# Patient Record
Sex: MALE | Race: WHITE | Employment: FULL TIME | URBAN - METROPOLITAN AREA
[De-identification: names, ages, dates, MRNs, and addresses within clinical notes are randomized per-mention and may not be internally consistent; named-entity substitution may affect disease eponyms.]

---

## 2017-05-22 ENCOUNTER — OFFICE VISIT (OUTPATIENT)
Dept: DERMATOLOGY | Age: 52
End: 2017-05-22

## 2017-05-22 DIAGNOSIS — Z12.83 SCREENING EXAM FOR SKIN CANCER: ICD-10-CM

## 2017-05-22 DIAGNOSIS — L57.0 ACTINIC KERATOSES: ICD-10-CM

## 2017-05-22 DIAGNOSIS — Z80.8 FAMILY HISTORY OF MELANOMA: ICD-10-CM

## 2017-05-22 DIAGNOSIS — D22.9 MULTIPLE BENIGN NEVI: Primary | ICD-10-CM

## 2017-05-22 DIAGNOSIS — Z85.828 HISTORY OF NONMELANOMA SKIN CANCER: ICD-10-CM

## 2017-05-22 PROCEDURE — 99214 OFFICE O/P EST MOD 30 MIN: CPT | Performed by: DERMATOLOGY

## 2017-05-22 PROCEDURE — 17003 DESTRUCT PREMALG LES 2-14: CPT | Performed by: DERMATOLOGY

## 2017-05-22 PROCEDURE — 17000 DESTRUCT PREMALG LESION: CPT | Performed by: DERMATOLOGY

## 2018-06-04 ENCOUNTER — OFFICE VISIT (OUTPATIENT)
Dept: DERMATOLOGY | Age: 53
End: 2018-06-04

## 2018-06-04 DIAGNOSIS — Z85.828 HISTORY OF NONMELANOMA SKIN CANCER: ICD-10-CM

## 2018-06-04 DIAGNOSIS — Z80.8 FAMILY HISTORY OF MELANOMA: ICD-10-CM

## 2018-06-04 DIAGNOSIS — D22.9 MULTIPLE BENIGN NEVI: Primary | ICD-10-CM

## 2018-06-04 DIAGNOSIS — L57.0 ACTINIC KERATOSES: ICD-10-CM

## 2018-06-04 DIAGNOSIS — Z12.83 SCREENING EXAM FOR SKIN CANCER: ICD-10-CM

## 2018-06-04 PROCEDURE — 99213 OFFICE O/P EST LOW 20 MIN: CPT | Performed by: DERMATOLOGY

## 2018-06-04 PROCEDURE — 17000 DESTRUCT PREMALG LESION: CPT | Performed by: DERMATOLOGY

## 2018-06-04 PROCEDURE — 17003 DESTRUCT PREMALG LES 2-14: CPT | Performed by: DERMATOLOGY

## 2019-07-25 ENCOUNTER — OFFICE VISIT (OUTPATIENT)
Dept: INTERNAL MEDICINE CLINIC | Age: 54
End: 2019-07-25
Payer: COMMERCIAL

## 2019-07-25 ENCOUNTER — HOSPITAL ENCOUNTER (OUTPATIENT)
Age: 54
Discharge: HOME | End: 2019-07-25
Payer: COMMERCIAL

## 2019-07-25 VITALS
OXYGEN SATURATION: 98 % | DIASTOLIC BLOOD PRESSURE: 80 MMHG | HEART RATE: 88 BPM | SYSTOLIC BLOOD PRESSURE: 130 MMHG | RESPIRATION RATE: 16 BRPM | BODY MASS INDEX: 26.77 KG/M2 | HEIGHT: 70 IN | WEIGHT: 187 LBS

## 2019-07-25 DIAGNOSIS — Z11.59 SCREENING FOR VIRAL DISEASE: ICD-10-CM

## 2019-07-25 DIAGNOSIS — Z00.00 ROUTINE GENERAL MEDICAL EXAMINATION AT A HEALTH CARE FACILITY: ICD-10-CM

## 2019-07-25 DIAGNOSIS — Z12.11 SCREEN FOR COLON CANCER: ICD-10-CM

## 2019-07-25 DIAGNOSIS — Z12.5 SPECIAL SCREENING FOR MALIGNANT NEOPLASM OF PROSTATE: ICD-10-CM

## 2019-07-25 DIAGNOSIS — Z00.00 WELL ADULT EXAM: Primary | ICD-10-CM

## 2019-07-25 DIAGNOSIS — Z13.1 SCREENING FOR DIABETES MELLITUS: ICD-10-CM

## 2019-07-25 LAB
A/G RATIO: 1.7 (ref 1.1–2.2)
ALBUMIN SERPL-MCNC: 4.7 G/DL (ref 3.4–5)
ALP BLD-CCNC: 68 U/L (ref 40–129)
ALT SERPL-CCNC: 20 U/L (ref 10–40)
ANION GAP SERPL CALCULATED.3IONS-SCNC: 13 MMOL/L (ref 3–16)
AST SERPL-CCNC: 22 U/L (ref 15–37)
BILIRUB SERPL-MCNC: 0.4 MG/DL (ref 0–1)
BUN BLDV-MCNC: 13 MG/DL (ref 7–20)
CALCIUM SERPL-MCNC: 9.5 MG/DL (ref 8.3–10.6)
CHLORIDE BLD-SCNC: 102 MMOL/L (ref 99–110)
CHOLESTEROL, TOTAL: 197 MG/DL (ref 0–199)
CO2: 26 MMOL/L (ref 21–32)
CREAT SERPL-MCNC: 0.9 MG/DL (ref 0.9–1.3)
ESTIMATED AVERAGE GLUCOSE: 119.8 MG/DL
GFR AFRICAN AMERICAN: >60
GFR NON-AFRICAN AMERICAN: >60
GLOBULIN: 2.7 G/DL
GLUCOSE BLD-MCNC: 112 MG/DL (ref 70–99)
HBA1C MFR BLD: 5.8 %
HDLC SERPL-MCNC: 40 MG/DL (ref 40–60)
HEPATITIS C ANTIBODY INTERPRETATION: NORMAL
LDL CHOLESTEROL CALCULATED: 106 MG/DL
POTASSIUM SERPL-SCNC: 4.2 MMOL/L (ref 3.5–5.1)
PROSTATE SPECIFIC ANTIGEN: 1.32 NG/ML (ref 0–4)
SODIUM BLD-SCNC: 141 MMOL/L (ref 136–145)
TOTAL PROTEIN: 7.4 G/DL (ref 6.4–8.2)
TRIGL SERPL-MCNC: 255 MG/DL (ref 0–150)
VLDLC SERPL CALC-MCNC: 51 MG/DL

## 2019-07-25 PROCEDURE — 83036 HEMOGLOBIN GLYCOSYLATED A1C: CPT

## 2019-07-25 PROCEDURE — 84153 ASSAY OF PSA TOTAL: CPT

## 2019-07-25 PROCEDURE — 99396 PREV VISIT EST AGE 40-64: CPT | Performed by: INTERNAL MEDICINE

## 2019-07-25 PROCEDURE — 36415 COLL VENOUS BLD VENIPUNCTURE: CPT

## 2019-07-25 PROCEDURE — 86803 HEPATITIS C AB TEST: CPT

## 2019-07-25 PROCEDURE — 80061 LIPID PANEL: CPT

## 2019-07-25 PROCEDURE — 80053 COMPREHEN METABOLIC PANEL: CPT

## 2019-07-25 ASSESSMENT — PATIENT HEALTH QUESTIONNAIRE - PHQ9
SUM OF ALL RESPONSES TO PHQ QUESTIONS 1-9: 0
SUM OF ALL RESPONSES TO PHQ QUESTIONS 1-9: 0
1. LITTLE INTEREST OR PLEASURE IN DOING THINGS: 0
2. FEELING DOWN, DEPRESSED OR HOPELESS: 0
SUM OF ALL RESPONSES TO PHQ9 QUESTIONS 1 & 2: 0

## 2019-07-25 NOTE — PROGRESS NOTES
Subjective:      Patient ID: Wilma Moncada is a 62 y.o. male. HPI  Here for a well exam. No health concerns  Past Medical History, Medications, Social History, Family History, Health Maintenance have been reviewed with Juan Diego Sandra. Review of Systems   Constitutional: Negative for unexpected weight change. Respiratory: Negative for cough, chest tightness, shortness of breath and wheezing. Cardiovascular: Negative for chest pain, palpitations and leg swelling. Gastrointestinal: Negative for nausea, vomiting, diarrhea, constipation and abdominal distention. Musculoskeletal: Negative for myalgias, back pain and arthralgias. Neurological: Negative for tremors and numbness. All other systems reviewed and are negative. Objective:   Physical Exam   Constitutional: He is oriented to person, place, and time. He appears well-developed and well-nourished. No distress. HENT:   Right Ear: External ear normal.   Left Ear: External ear normal.   Mouth/Throat: Oropharynx is clear and moist. No oropharyngeal exudate. Neck: Neck supple. No thyromegaly present. Cardiovascular: Normal rate, regular rhythm and normal heart sounds. Pulmonary/Chest: Effort normal and breath sounds normal. No respiratory distress. He has no wheezes. He has no rales. Abdominal: Soft. He exhibits no distension. There is no tenderness. There is no rebound and no guarding. Genitourinary:   declines   Musculoskeletal: He exhibits no edema. Neurological: He is alert and oriented to person, place, and time. Skin: He is not diaphoretic. Vitals:    07/25/19 0710   BP: (!) 150/102 130/80   Pulse: 88   Resp: 16   SpO2: 98%   Waist     36\"      Assessment:     Encouraged exercise and healthy diet. F/u with ob/gyn and dentist regularly. Recommend eye exam.  Wears seat belt. Provided rx for fasting labs. Continue medications.      FIT test ordered    Plan:      See above plan

## 2019-08-22 ENCOUNTER — TELEPHONE (OUTPATIENT)
Dept: INTERNAL MEDICINE CLINIC | Age: 54
End: 2019-08-22

## 2019-08-26 ENCOUNTER — TELEPHONE (OUTPATIENT)
Dept: INTERNAL MEDICINE CLINIC | Age: 54
End: 2019-08-26

## 2020-07-27 ENCOUNTER — HOSPITAL ENCOUNTER (EMERGENCY)
Age: 55
Discharge: HOME | End: 2020-07-27
Payer: COMMERCIAL

## 2020-07-27 VITALS
SYSTOLIC BLOOD PRESSURE: 151 MMHG | DIASTOLIC BLOOD PRESSURE: 99 MMHG | BODY MASS INDEX: 27.2 KG/M2 | OXYGEN SATURATION: 98 % | HEART RATE: 89 BPM | RESPIRATION RATE: 18 BRPM | HEIGHT: 70 IN | TEMPERATURE: 98.3 F | WEIGHT: 190 LBS

## 2020-07-27 PROCEDURE — 12001 RPR S/N/AX/GEN/TRNK 2.5CM/<: CPT

## 2020-07-27 PROCEDURE — 99283 EMERGENCY DEPT VISIT LOW MDM: CPT

## 2020-07-27 ASSESSMENT — PAIN SCALES - GENERAL: PAINLEVEL_OUTOF10: 1

## 2020-07-27 NOTE — ED PROVIDER NOTES
This patient was not seen and evaluated by the attending physician. CHIEF COMPLAINT  Laceration (pt has left 4th finger laceration from hedging at work today. Bleeding controlled at this time. )      HISTORY OF PRESENT ILLNESS  Norman Cogan is a 60 y.o. male who presents to the ED for evaluation of laceration to the tip of his left fourth finger. Patient was using a hedge or at work today when he clipped it. Small amount of bleeding. Is not anticoagulated. He has no other injuries or complaints. Patient is up-to-date on tetanus    LOCATION:left ring finger  QUALITY:ache  SEVERITY:1  DURATION:happened just pta  MODIFYING FACTORS:none noted    No other complaints, modifying factors or associated symptoms. Nursing notes reviewed. History reviewed. No pertinent past medical history. History reviewed. No pertinent surgical history. Family History   Problem Relation Age of Onset    Cancer Mother         skin     Cancer Father         skin, prostate     Cancer Sister         skin, Melanoma     Social History     Socioeconomic History    Marital status:      Spouse name: Not on file    Number of children: Not on file    Years of education: Not on file    Highest education level: Not on file   Occupational History    Not on file   Social Needs    Financial resource strain: Not on file    Food insecurity     Worry: Not on file     Inability: Not on file    Transportation needs     Medical: Not on file     Non-medical: Not on file   Tobacco Use    Smoking status: Never Smoker    Smokeless tobacco: Never Used   Substance and Sexual Activity    Alcohol use:  Yes     Alcohol/week: 7.0 standard drinks     Types: 7 Standard drinks or equivalent per week    Drug use: No    Sexual activity: Not on file   Lifestyle    Physical activity     Days per week: Not on file     Minutes per session: Not on file    Stress: Not on file   Relationships    Social connections     Talks on phone: Not on file     Gets together: Not on file     Attends Hoahaoism service: Not on file     Active member of club or organization: Not on file     Attends meetings of clubs or organizations: Not on file     Relationship status: Not on file    Intimate partner violence     Fear of current or ex partner: Not on file     Emotionally abused: Not on file     Physically abused: Not on file     Forced sexual activity: Not on file   Other Topics Concern    Not on file   Social History Narrative    Not on file     No current facility-administered medications for this encounter. No current outpatient medications on file. No Known Allergies    REVIEW OF SYSTEMS  6 systems reviewed, pertinent positives per HPI otherwise noted to be negative    PHYSICAL EXAM  BP (!) 151/99   Pulse 89   Temp 98.3 °F (36.8 °C) (Oral)   Resp 18   Ht 5' 10\" (1.778 m)   Wt 190 lb (86.2 kg)   SpO2 98%   BMI 27.26 kg/m²   GENERAL APPEARANCE: Awake and alert. Cooperative. No acute distress. HEAD: Normocephalic. Atraumatic. EYES: No outward signs of trauma. No obvious abnormality. EOM's grossly intact. ENT: Mucous membranes are moist.   NECK: Supple. Normal ROM. CHEST: Equal symmetric chest rise. LUNGS: Breathing is unlabored. No obvious respiratory distress. Abdomen: Nondistended  EXTREMITIES: MAEE. No acute deformities. SKIN: Warm and dry. 2 cm laceration to the distal tip of the left fourth digit. Bleeding is controlled, no evidence of tendon involvement. No neurologic deficits. No foreign body. NEUROLOGICAL: Alert and oriented. Strength is 5/5 in all extremities and sensation is intact. PSYCH: Normal Affect  Labs:    No results found for this visit on 07/27/20. RADIOLOGY  No results found. PROCEDURE:  LACERATION REPAIR  Tokandi Sandra or their surrogate had an opportunity to ask questions, and the risks, benefits, and alternatives were discussed. The laceration is 2cm in length. The wound was prepped and draped to maintain a sterile field. A local anesthetic was used to completely anesthetize the wound. It was copiously irrigated. It was explored to its depth in a bloodless field with no sign of tendon, nerve, or vascular injury. No foreign bodies were identified. It was closed with 5-0 ethilon sutures. There were no complications during the procedure. ED COURSE/MDM  Patient seen and evaluated here in the ER with the supervising physician available for consultation. Patient presented to the emergency room today with complaints of a laceration. Patient had a 2 cm laceration to the distal tip of the left fourth digit. Patient laceration was cleansed and closed, see procedure note. Patient was instructed to monitor for signs of infection, sutures to be removed in 10 to 14 days. Patient verbalized understanding of the plan to keep the area clean and dry and return to the ED for any signs of infection. Patient was given scripts for the following medications. I counseled patient how to take these medications. New Prescriptions    No medications on file         MDM  Wound repaired without difficulty. Td updated or currently up to date. Provided with detailed laceration care instructions and explained signs and symptoms of infection. Will DC with follow up at appropriate time for suture removal. Return for any concerns. CLINICAL IMPRESSION  1. Laceration of left ring finger without foreign body without damage to nail, initial encounter        Blood pressure (!) 151/99, pulse 89, temperature 98.3 °F (36.8 °C), temperature source Oral, resp. rate 18, height 5' 10\" (1.778 m), weight 190 lb (86.2 kg), SpO2 98 %. DISPOSITION  Patient was discharged to home in good condition.         LELAND Duran - CNP  07/27/20 9770

## 2020-07-27 NOTE — ED NOTES
Wound care completed on patient. Tourniquet placed on left 4th finger. Cleansed with hibs cleanse and saline. Irrigated with >30mls of sterile saline.         Nguyen Davis RN  07/27/20 1152

## 2020-07-27 NOTE — ED NOTES
Pt's lac covered with sterile Gauze on arrival. Bleeding controlled.       Laurel Pereira RN  07/27/20 7675

## 2020-10-12 ENCOUNTER — HOSPITAL ENCOUNTER (INPATIENT)
Age: 55
LOS: 4 days | Discharge: HOME | DRG: 286 | End: 2020-10-16
Attending: EMERGENCY MEDICINE | Admitting: INTERNAL MEDICINE
Payer: COMMERCIAL

## 2020-10-12 ENCOUNTER — APPOINTMENT (OUTPATIENT)
Dept: GENERAL RADIOLOGY | Age: 55
DRG: 286 | End: 2020-10-12
Payer: COMMERCIAL

## 2020-10-12 PROBLEM — I50.9 ACUTE HEART FAILURE (HCC): Status: ACTIVE | Noted: 2020-10-12

## 2020-10-12 LAB
A/G RATIO: 1.6 (ref 1.1–2.2)
ALBUMIN SERPL-MCNC: 4 G/DL (ref 3.4–5)
ALP BLD-CCNC: 63 U/L (ref 40–129)
ALT SERPL-CCNC: 213 U/L (ref 10–40)
ANION GAP SERPL CALCULATED.3IONS-SCNC: 12 MMOL/L (ref 3–16)
AST SERPL-CCNC: 89 U/L (ref 15–37)
BASOPHILS ABSOLUTE: 0 K/UL (ref 0–0.2)
BASOPHILS RELATIVE PERCENT: 0.3 %
BILIRUB SERPL-MCNC: 1.1 MG/DL (ref 0–1)
BILIRUBIN URINE: NEGATIVE
BLOOD, URINE: NEGATIVE
BUN BLDV-MCNC: 28 MG/DL (ref 7–20)
CALCIUM SERPL-MCNC: 9.6 MG/DL (ref 8.3–10.6)
CHLORIDE BLD-SCNC: 106 MMOL/L (ref 99–110)
CLARITY: CLEAR
CO2: 24 MMOL/L (ref 21–32)
COLOR: YELLOW
CREAT SERPL-MCNC: 1 MG/DL (ref 0.9–1.3)
EKG ATRIAL RATE: 110 BPM
EKG DIAGNOSIS: NORMAL
EKG P AXIS: 32 DEGREES
EKG P-R INTERVAL: 146 MS
EKG Q-T INTERVAL: 384 MS
EKG QRS DURATION: 134 MS
EKG QTC CALCULATION (BAZETT): 519 MS
EKG R AXIS: 52 DEGREES
EKG T AXIS: 186 DEGREES
EKG VENTRICULAR RATE: 110 BPM
EOSINOPHILS ABSOLUTE: 0 K/UL (ref 0–0.6)
EOSINOPHILS RELATIVE PERCENT: 0.1 %
GFR AFRICAN AMERICAN: >60
GFR NON-AFRICAN AMERICAN: >60
GLOBULIN: 2.5 G/DL
GLUCOSE BLD-MCNC: 164 MG/DL (ref 70–99)
GLUCOSE URINE: NEGATIVE MG/DL
HCT VFR BLD CALC: 42.8 % (ref 40.5–52.5)
HEMOGLOBIN: 14.3 G/DL (ref 13.5–17.5)
KETONES, URINE: NEGATIVE MG/DL
LACTIC ACID: 1.6 MMOL/L (ref 0.4–2)
LEUKOCYTE ESTERASE, URINE: NEGATIVE
LYMPHOCYTES ABSOLUTE: 1.5 K/UL (ref 1–5.1)
LYMPHOCYTES RELATIVE PERCENT: 12.5 %
MAGNESIUM: 2.2 MG/DL (ref 1.8–2.4)
MCH RBC QN AUTO: 28.3 PG (ref 26–34)
MCHC RBC AUTO-ENTMCNC: 33.5 G/DL (ref 31–36)
MCV RBC AUTO: 84.5 FL (ref 80–100)
MICROSCOPIC EXAMINATION: NORMAL
MONOCYTES ABSOLUTE: 0.9 K/UL (ref 0–1.3)
MONOCYTES RELATIVE PERCENT: 7.4 %
NEUTROPHILS ABSOLUTE: 9.3 K/UL (ref 1.7–7.7)
NEUTROPHILS RELATIVE PERCENT: 79.7 %
NITRITE, URINE: NEGATIVE
PDW BLD-RTO: 13.8 % (ref 12.4–15.4)
PH UA: 6 (ref 5–8)
PLATELET # BLD: 272 K/UL (ref 135–450)
PMV BLD AUTO: 9.4 FL (ref 5–10.5)
POTASSIUM SERPL-SCNC: 4.2 MMOL/L (ref 3.5–5.1)
PRO-BNP: ABNORMAL PG/ML (ref 0–124)
PROTEIN UA: NEGATIVE MG/DL
RBC # BLD: 5.07 M/UL (ref 4.2–5.9)
SARS-COV-2, NAAT: NOT DETECTED
SODIUM BLD-SCNC: 142 MMOL/L (ref 136–145)
SPECIFIC GRAVITY UA: 1.02 (ref 1–1.03)
TOTAL PROTEIN: 6.5 G/DL (ref 6.4–8.2)
TSH SERPL DL<=0.05 MIU/L-ACNC: 3.23 UIU/ML (ref 0.27–4.2)
URINE TYPE: NORMAL
UROBILINOGEN, URINE: 0.2 E.U./DL
WBC # BLD: 11.7 K/UL (ref 4–11)

## 2020-10-12 PROCEDURE — 6360000002 HC RX W HCPCS: Performed by: INTERNAL MEDICINE

## 2020-10-12 PROCEDURE — 80053 COMPREHEN METABOLIC PANEL: CPT

## 2020-10-12 PROCEDURE — 1200000000 HC SEMI PRIVATE

## 2020-10-12 PROCEDURE — 85025 COMPLETE CBC W/AUTO DIFF WBC: CPT

## 2020-10-12 PROCEDURE — 84443 ASSAY THYROID STIM HORMONE: CPT

## 2020-10-12 PROCEDURE — 93005 ELECTROCARDIOGRAM TRACING: CPT | Performed by: EMERGENCY MEDICINE

## 2020-10-12 PROCEDURE — 99285 EMERGENCY DEPT VISIT HI MDM: CPT

## 2020-10-12 PROCEDURE — 99254 IP/OBS CNSLTJ NEW/EST MOD 60: CPT | Performed by: INTERNAL MEDICINE

## 2020-10-12 PROCEDURE — 83880 ASSAY OF NATRIURETIC PEPTIDE: CPT

## 2020-10-12 PROCEDURE — U0002 COVID-19 LAB TEST NON-CDC: HCPCS

## 2020-10-12 PROCEDURE — 2580000003 HC RX 258: Performed by: INTERNAL MEDICINE

## 2020-10-12 PROCEDURE — 96374 THER/PROPH/DIAG INJ IV PUSH: CPT

## 2020-10-12 PROCEDURE — 83735 ASSAY OF MAGNESIUM: CPT

## 2020-10-12 PROCEDURE — 6370000000 HC RX 637 (ALT 250 FOR IP): Performed by: EMERGENCY MEDICINE

## 2020-10-12 PROCEDURE — 83605 ASSAY OF LACTIC ACID: CPT

## 2020-10-12 PROCEDURE — 93010 ELECTROCARDIOGRAM REPORT: CPT | Performed by: INTERNAL MEDICINE

## 2020-10-12 PROCEDURE — 71045 X-RAY EXAM CHEST 1 VIEW: CPT

## 2020-10-12 PROCEDURE — 6360000002 HC RX W HCPCS: Performed by: EMERGENCY MEDICINE

## 2020-10-12 PROCEDURE — 81003 URINALYSIS AUTO W/O SCOPE: CPT

## 2020-10-12 RX ORDER — SODIUM CHLORIDE 0.9 % (FLUSH) 0.9 %
10 SYRINGE (ML) INJECTION PRN
Status: DISCONTINUED | OUTPATIENT
Start: 2020-10-12 | End: 2020-10-16 | Stop reason: HOSPADM

## 2020-10-12 RX ORDER — ONDANSETRON 2 MG/ML
4 INJECTION INTRAMUSCULAR; INTRAVENOUS EVERY 6 HOURS PRN
Status: DISCONTINUED | OUTPATIENT
Start: 2020-10-12 | End: 2020-10-16 | Stop reason: HOSPADM

## 2020-10-12 RX ORDER — PROMETHAZINE HYDROCHLORIDE 25 MG/1
12.5 TABLET ORAL EVERY 6 HOURS PRN
Status: DISCONTINUED | OUTPATIENT
Start: 2020-10-12 | End: 2020-10-16 | Stop reason: HOSPADM

## 2020-10-12 RX ORDER — LISINOPRIL 5 MG/1
5 TABLET ORAL DAILY
Status: DISCONTINUED | OUTPATIENT
Start: 2020-10-13 | End: 2020-10-14

## 2020-10-12 RX ORDER — FUROSEMIDE 10 MG/ML
40 INJECTION INTRAMUSCULAR; INTRAVENOUS 2 TIMES DAILY
Status: DISCONTINUED | OUTPATIENT
Start: 2020-10-12 | End: 2020-10-13

## 2020-10-12 RX ORDER — ACETAMINOPHEN 650 MG/1
650 SUPPOSITORY RECTAL EVERY 6 HOURS PRN
Status: DISCONTINUED | OUTPATIENT
Start: 2020-10-12 | End: 2020-10-16 | Stop reason: HOSPADM

## 2020-10-12 RX ORDER — ACETAMINOPHEN 325 MG/1
650 TABLET ORAL EVERY 6 HOURS PRN
Status: DISCONTINUED | OUTPATIENT
Start: 2020-10-12 | End: 2020-10-16 | Stop reason: HOSPADM

## 2020-10-12 RX ORDER — POLYETHYLENE GLYCOL 3350 17 G/17G
17 POWDER, FOR SOLUTION ORAL DAILY PRN
Status: DISCONTINUED | OUTPATIENT
Start: 2020-10-12 | End: 2020-10-16 | Stop reason: HOSPADM

## 2020-10-12 RX ORDER — FUROSEMIDE 10 MG/ML
40 INJECTION INTRAMUSCULAR; INTRAVENOUS ONCE
Status: COMPLETED | OUTPATIENT
Start: 2020-10-12 | End: 2020-10-12

## 2020-10-12 RX ORDER — SODIUM CHLORIDE 0.9 % (FLUSH) 0.9 %
10 SYRINGE (ML) INJECTION EVERY 12 HOURS SCHEDULED
Status: DISCONTINUED | OUTPATIENT
Start: 2020-10-12 | End: 2020-10-16 | Stop reason: HOSPADM

## 2020-10-12 RX ADMIN — FUROSEMIDE 40 MG: 10 INJECTION, SOLUTION INTRAMUSCULAR; INTRAVENOUS at 09:03

## 2020-10-12 RX ADMIN — NITROGLYCERIN 1 INCH: 20 OINTMENT TOPICAL at 08:33

## 2020-10-12 RX ADMIN — Medication 10 ML: at 20:36

## 2020-10-12 RX ADMIN — FUROSEMIDE 40 MG: 10 INJECTION, SOLUTION INTRAMUSCULAR; INTRAVENOUS at 17:43

## 2020-10-12 ASSESSMENT — ENCOUNTER SYMPTOMS
ABDOMINAL PAIN: 0
COUGH: 1
SORE THROAT: 0
ABDOMINAL DISTENTION: 1
CHEST TIGHTNESS: 0
SHORTNESS OF BREATH: 1
DIARRHEA: 0
BACK PAIN: 0
NAUSEA: 0
COLOR CHANGE: 0
STRIDOR: 0
WHEEZING: 0
VOMITING: 0

## 2020-10-12 NOTE — ED NOTES
Shadi [email or web address]  Re: admit.  CHF exacerbation per Grant [email or web address]     Dartha Dolin Naegele  10/12/20 9257

## 2020-10-12 NOTE — ED PROVIDER NOTES
EMERGENCY DEPARTMENT ENCOUNTER        Pt Name: Hi Vance  MRN: [id number]  [place] 1965  Date of evaluation: 10/12/2020  Provider: Anatoliy Novoa MD  PCP: Karmen Valdez MD      [address] [place] [address]       Chief Complaint   Patient presents with    Shortness of Breath     chest congestion started last week and then on Saturday he noticed the shortness of breath with swollen ankles.  Foot Swelling       HISTORY OFPRESENT ILLNESS   (Location/Symptom, Timing/Onset, Context/Setting, Quality, Duration, Modifying Factors,Severity)  Note limiting factors. Hi Vance is a 60 y.o. male presenting today due to concern for increasing shortness of breath with chest congestion starting 1 week ago but then noticing swelling to both of his ankles over the last 2 days along with dyspnea on exertion. He did have a cough and upper respiratory illness 2 weeks ago but states the symptoms did seem to improve although he still felt like he has something he needs to cough up in his chest.  He denies any fever or known exposure to COVID. He denies any known history of heart disease. He does have a very mild headache but is not concerned about this. No numbness or weakness on the right or left side of the body. No chest pain. Since nothing was helping with the shortness of breath and he was having worsening leg swelling, he came to the ED for further evaluation. REVIEW OF SYSTEMS    (2-9 systems for level 4, 10 or more for level 5)     Review of Systems   Constitutional: Positive for fatigue. Negative for chills, diaphoresis and fever. HENT: Positive for congestion. Negative for sore throat. Respiratory: Positive for cough and shortness of breath. Negative for chest tightness, wheezing and stridor. Cardiovascular: Positive for leg swelling. Negative for chest pain. Gastrointestinal: Positive for abdominal distention (feels more distended per patient).  Negative for abdominal pain, diarrhea, nausea and vomiting. Genitourinary: Negative for decreased urine volume and flank pain. Musculoskeletal: Negative for back pain and neck pain. Skin: Negative for color change. Neurological: Positive for headaches (very mild). Negative for syncope, weakness and light-headedness. Psychiatric/Behavioral: Negative for confusion. Positives and Pertinent negatives as per HPI. PASTMEDICAL HISTORY   History reviewed. No pertinent past medical history. SURGICAL HISTORY     History reviewed. No pertinent surgical history. CURRENT MEDICATIONS       There are no discharge medications for this patient. ALLERGIES     Patient has no known allergies.     FAMILY HISTORY       Family History   Problem Relation Age of Onset    Cancer Mother         skin     Cancer Father         skin, prostate     Cancer Sister         skin, Melanoma          SOCIAL HISTORY       Social History     Socioeconomic History    Marital status:      Spouse name: None    Number of children: None    Years of education: None    Highest education level: None   Occupational History    None   Social Needs    Financial resource strain: None    Food insecurity     Worry: None     Inability: None    Transportation needs     Medical: None     Non-medical: None   Tobacco Use    Smoking status: Never Smoker    Smokeless tobacco: Never Used   Substance and Sexual Activity    Alcohol use: Yes     Comment: rarely    Drug use: No    Sexual activity: None   Lifestyle    Physical activity     Days per week: None     Minutes per session: None    Stress: None   Relationships    Social connections     Talks on phone: None     Gets together: None     Attends Mu-ism service: None     Active member of club or organization: None     Attends meetings of clubs or organizations: None     Relationship status: None    Intimate partner violence     Fear of current or ex partner: None     Emotionally abused: None Physically abused: None     Forced sexual activity: None   Other Topics Concern    None   Social History Narrative    None       SCREENINGS    [place] Coma Scale  Eye Opening: Spontaneous  Best Verbal Response: Oriented  Best Motor Response: Obeys commands  Brittany Coma Scale Score: 15           PHYSICAL EXAM    (up to 7 for level 4, 8 or more for level 5)     ED Triage Vitals [10/12/20 0737]   BP Temp Temp Source Pulse Resp SpO2 Height Weight   (!) 160/117 98.5 °F (36.9 °C) Oral 110 23 95 % 5' 10\" (1.778 m) 195 lb (88.5 kg)       Physical Exam  Vitals signs and nursing note reviewed. Constitutional:       General: He is awake. He is not in acute distress. Appearance: Normal appearance. He is well-developed, well-groomed and overweight. He is not ill-appearing, toxic-appearing or diaphoretic. Interventions: He is not intubated. HENT:      Head: Normocephalic and atraumatic. Right Ear: External ear normal.      Left Ear: External ear normal.      Nose: Nose normal.   Eyes:      General:         Right eye: No discharge. Left eye: No discharge. Pupils: Pupils are equal, round, and reactive to light. Neck:      Musculoskeletal: Full passive range of motion without pain, normal range of motion and neck supple. Normal range of motion. No edema, erythema or neck rigidity. Trachea: Trachea normal. No tracheal deviation. Cardiovascular:      Rate and Rhythm: Regular rhythm. Tachycardia present. Pulses: Normal pulses. Radial pulses are 2+ on the right side and 2+ on the left side. Pulmonary:      Effort: Pulmonary effort is normal. No tachypnea, bradypnea, accessory muscle usage, prolonged expiration, respiratory distress or retractions. He is not intubated. Breath sounds: Normal air entry. No stridor, decreased air movement or transmitted upper airway sounds. Examination of the right-lower field reveals rales. Examination of the left-lower field reveals rales. Rhonchi and rales present. No decreased breath sounds or wheezing. Chest:      Chest wall: No tenderness. Abdominal:      General: Bowel sounds are normal. There is no distension. Palpations: Abdomen is soft. Tenderness: There is no abdominal tenderness. There is no guarding or rebound. Musculoskeletal: Normal range of motion. General: Swelling present. No tenderness, deformity or signs of injury. Right lower leg: He exhibits no tenderness. 1+ Pitting Edema present. Left lower leg: He exhibits no tenderness. 1+ Pitting Edema present. Skin:     General: Skin is warm and dry. Coloration: Skin is not jaundiced or pale. Findings: No bruising, erythema, lesion or rash. Neurological:      Mental Status: He is alert and oriented to person, place, and time. Mental status is at baseline. GCS: GCS eye subscore is 4. GCS verbal subscore is 5. GCS motor subscore is 6. Sensory: Sensation is intact. No sensory deficit. Motor: Motor function is intact. No weakness, tremor, atrophy, abnormal muscle tone or seizure activity. Psychiatric:         Mood and Affect: Mood normal.         Behavior: Behavior normal. Behavior is cooperative.              DIAGNOSTIC RESULTS   :    Labs Reviewed   BRAIN NATRIURETIC PEPTIDE - Abnormal; Notable for the following components:       Result Value    Pro-BNP 10,055 (*)     All other components within normal limits    Narrative:     Performed at:  [address]   Phone [phone]   CBC WITH AUTO DIFFERENTIAL - Abnormal; Notable for the following components:    WBC 11.7 (*)     Neutrophils Absolute 9.3 (*)     All other components within normal limits    Narrative:     Performed at:  [place] [address] [address] [place]   Phone [phone]   COMPREHENSIVE METABOLIC PANEL - Abnormal; Notable for the following components:    Glucose 164 (*)     BUN 28 (*)     Total Bilirubin 1.1 (*)      (*)     AST 89 (*)     All other components within normal limits    Narrative:     Performed at:  [place] [address],  [place], [place] [employer] [place]   Phone [phone]   LACTIC ACID, PLASMA    Narrative:     Performed at:  [address],  [place], [place] [employer] [place]   Phone [phone]   URINALYSIS    Narrative:     Performed at:  [address],  [place], [address] [employer] [place]   Phone [phone]    Narrative:     Performed at:  [address],  [place], [address] [employer] [place]   Phone [phone]   MAGNESIUM    Narrative:     Performed at:  [address],  [place], [place] [employer]   Phone [phone]   TSH WITHOUT REFLEX       All other labs were within normal range or not returned asof this dictation. EKG: All EKG's are interpreted by the Emergency Department Physician who either signs or Co-signs this chart in the absence of a cardiologist.    The Ekg interpreted by me shows  sinus tachycardia, ypxl=265   Axis is   Normal  QTc is  within an acceptable range  Intervals and Durations are unremarkable.       ST Segments: nonspecific changes  No significant change from prior EKG dated - no old EKG  No STEMI based on S[place] criteria, LBBB present today but no old EKG to compare it to           RADIOLOGY:   Non-plain film images such as CT, Ultrasound and MRI are read by the radiologist. Job Benders images are visualized and preliminarily interpreted by the  ED Provider with the belowfindings:        Interpretation per the Radiologist below, if available at the time of this note:    XR CHEST PORTABLE   Final Result   Perihilar and bibasilar airspace opacities with associated pleural effusions,   slightly worse on the right. Overall pattern suspected to represent   developing pulmonary edema. An underlying viral infection can not be   excluded. Multifocal pneumonia would be considered less likely. PROCEDURES   Unless otherwise noted below, none     Procedures    CRITICAL CARE TIME   Time: at least 20 minutes  Includes examination, speaking with consultants, lab interpretation, charting, treating for acute CHF exacerbation with IV Lasix and Nitro paste    Excludes separate billable procedures. Patient at risk for serious decompensation if not treated for this life-threatening condition. CONSULTS: Spoke with Dr. Veronica Mcnamara at 0782 for admission.     IP CONSULT TO HOSPITALIST  IP CONSULT TO CARDIOLOGY  IP CONSULT TO HEART FAILURE NURSE/COORDINATOR  IP CONSULT TO DIETITIAN    EMERGENCY DEPARTMENT COURSE and DIFFERENTIAL DIAGNOSIS/MDM:   Vitals:    Vitals:    10/12/20 1038 10/12/20 1045 10/12/20 1059 10/12/20 1643   BP: (!) 146/103  (!) 146/81 (!) 142/102   Pulse: 97  116 111   Resp: 15  18 16   Temp:   98.2 °F (36.8 °C) 98.4 °F (36.9 °C)   TempSrc:   Oral Oral   SpO2: 90%  95% 92%   Weight:  199 lb 12.8 oz (90.6 kg)     Height:   5' 10\" (1.778 m)        Patient was given the following medications:  Medications   sodium chloride flush 0.9 % injection 10 mL (0 mLs Intravenous Held 10/12/20 1227)   sodium chloride flush 0.9 % injection 10 mL (has no administration in time range)   acetaminophen (TYLENOL) tablet 650 mg (has no administration in time range)     Or   acetaminophen (TYLENOL) suppository 650 mg (has no administration in time range)   polyethylene glycol (GLYCOLAX) packet 17 g (has no administration in time range)   promethazine (PHENERGAN) tablet 12.5 mg (has no administration in time range)     Or   ondansetron (ZOFRAN) injection 4 mg (has no administration in time range)   enoxaparin (LOVENOX) injection 40 mg (has no administration in time range)   lisinopril (PRINIVIL;ZESTRIL) tablet 5 mg (has no administration in time range)   furosemide (LASIX) injection 40 mg (has no administration in time range)   perflutren lipid microspheres (DEFINITY) injection 1.65 mg (has no administration in time range)   nitroglycerin (NITRO-BID) 2 % ointment 1 inch (1 inch Topical Given 10/12/20 0833)   furosemide (LASIX) injection 40 mg (40 mg Intravenous Given 10/12/20 0903)     Patient was evaluated due to concern for increasing shortness of breath and feeling like he has something in his chest that he wants to cough up over the last few days along with noticing swelling to bilateral legs for 2 days. I am concerned about CHF exacerbation but also I am considering COPD exacerbation, pneumonia, pulmonary embolism, acute coronary syndrome, kidney failure, liver failure, amongst other pathology. X-ray was concerning for bilateral pulmonary infiltrates and at this time I have low suspicion for infection. COVID was negative. He may need to be worked up for pericarditis based on his symptoms. He did receive IV Lasix in the ED. He is stable for the floor with telemetry. He will also need evaluation for possible ischemic cardiomyopathy. The patient tolerated their visit well. The patient and / or the family were informed of the results of any tests, a time was given to answer questions. FINAL IMPRESSION      1. Acute congestive heart failure, unspecified heart failure type ([address]    2. Left bundle branch block    3. Transaminitis          DISPOSITION/PLAN   DISPOSITION Admitted 10/12/2020 09:18:47 AM      PATIENT REFERRED TO:  No follow-up provider specified. DISCHARGEMEDICATIONS:  There are no discharge medications for this patient. DISCONTINUED MEDICATIONS:  There are no discharge medications for this patient.              (Please note that portions of this note were completed with a voicerecognition program.  Efforts were made to edit the dictations but occasionally words are

## 2020-10-12 NOTE — PLAN OF CARE
Problem: OXYGENATION/RESPIRATORY FUNCTION  Goal: Patient will maintain patent airway  Outcome: Ongoing     Patient's EF (Ejection Fraction) is not on file. Heart Failure Medications:  Diuretics[de-identified] Furosemide    (One of the following REQUIRED for EF <40%/SYSTOLIC FAILURE but MAY be used in EF% >40%/DIASTOLIC FAILURE)        ACE[de-identified] Lisinopril        ARB[de-identified] None         ARNI[de-identified] None    (Beta Blockers)  NON- Evidenced Based Beta Blocker (for EF% >40%/DIASTOLIC FAILURE): None    Evidenced Based Beta Blocker::(REQUIRED for EF% <40%/SYSTOLIC FAILURE) None  . .................................................................................................................................................. Patient's weights and intake/output reviewed: Yes    Patient's Last Weight: 199 lbs obtained by standing scale- admission weight    Intake/Output Summary (Last 24 hours) at 10/12/2020 1504  Last data filed at 10/12/2020 1446  Gross per 24 hour   Intake --   Output 700 ml   Net -700 ml       Comorbidities Reviewed Yes    Patient has no past medical history on file. >>For CHF and Comorbidity documentation on Education Time and Topics, please see Education Tab    Progressive Mobility Assessment:  What is this patient's Current Level of Mobility?: Ambulatory-Up Ad Jovita  How was this patient Mobilized today?:  Up to Toilet/Shower and Up in Room                 With Whom? Self                 Level of Difficulty/Assistance: Independent     Pt up in chair at this time on room air. Pt denies shortness of breath. Pt with pitting lower extremity edema.      Patient and/or Family's stated Goal of Care this Admission: increase activity tolerance, better understand heart failure and disease management, be more comfortable, and reduce lower extremity edema prior to discharge        :

## 2020-10-12 NOTE — PROGRESS NOTES
Ace wraps applied to BLE at this time. Explained the importance to patient to wear this at this time and throughout day.

## 2020-10-12 NOTE — CONSULTS
[address] [facility]  [phone]      Attending Physician: Claudia Pham MD  Reason for Consultation/Chief Complaint:SOB    Subjective   History of Present Illness: Adalberto Beth is a 60 y.o. patient who presented to the hospital with complaints of SOB. Stated started over last 2 wks. Progressive. Never at rest. Occurs with exertion but over short distance. No PND, + orthopnea. + BLE edema. + CP/heaviness. + with exertion. States normally 193-198-->199 now. States BP has been borderline high, but crept up recently     Past Medical History:   has no past medical history on file. Surgical History:   has no past surgical history on file. Social History:   reports that he has never smoked. He has never used smokeless tobacco. He reports current alcohol use. He reports that he does not use drugs. Family History:  family history includes Cancer in his father, mother, and sister. Mother- and MGF_ ? Cardiac disease. Home Medications:  Were reviewed and are listed in nursing record and/or below  Prior to Admission medications    Not on File        CURRENT Medications:  sodium chloride flush 0.9 % injection 10 mL, 2 times per day  sodium chloride flush 0.9 % injection 10 mL, PRN  acetaminophen (TYLENOL) tablet 650 mg, Q6H PRN    Or  acetaminophen (TYLENOL) suppository 650 mg, Q6H PRN  polyethylene glycol (GLYCOLAX) packet 17 g, Daily PRN  promethazine (PHENERGAN) tablet 12.5 mg, Q6H PRN    Or  ondansetron (ZOFRAN) injection 4 mg, Q6H PRN  [START ON 10/13/2020] enoxaparin (LOVENOX) injection 40 mg, Daily  [START ON 10/13/2020] lisinopril (PRINIVIL;ZESTRIL) tablet 5 mg, Daily  furosemide (LASIX) injection 40 mg, BID  perflutren lipid microspheres (DEFINITY) injection 1.65 mg, ONCE PRN        Allergies:  Patient has no known allergies. Review of Systems:   A 14 point review of symptoms completed.  Pertinent positives identified in the HPI, all other review of symptoms negative as below.      Objective   PHYSICAL EXAM:    Vitals:    10/12/20 1059   BP: (!) 146/81   Pulse: 116   Resp: 18   Temp: 98.2 °F (36.8 °C)   SpO2: 95%    Weight: 199 lb 12.8 oz (90.6 kg)         General Appearance:  Alert, cooperative, no distress, appears stated age   Head:  Normocephalic, without obvious abnormality, atraumatic   Eyes:  PERRL, conjunctiva/corneas clear   Nose: Nares normal, no drainage or sinus tenderness   Throat: Lips, mucosa, and tongue normal   Neck: Supple, symmetrical, trachea midline, no adenopathy, thyroid: not enlarged, symmetric, no tenderness/mass/nodules, no carotid bruit or JVD   Lungs:   Clear to auscultation bilaterally, respirations unlabored   Chest Wall:  No deformity or tenderness   Heart:  Regular rate and rhythm, S1, S2 normal, no murmur, rub or gallop   Abdomen:   Soft, non-tender, bowel sounds active all four quadrants,  no masses, no organomegaly   Extremities: Extremities normal, atraumatic, no cyanosis1+ BLEpitting edema   Pulses: 2+ and symmetric   Skin: Skin color, texture, turgor normal, no rashes or lesions   Pysch: Normal mood and affect   Neurologic: Normal gross motor and sensory exam.         Labs   CBC:   Lab Results   Component Value Date    WBC 11.7 10/12/2020    RBC 5.07 10/12/2020    HGB 14.3 10/12/2020    HCT 42.8 10/12/2020    MCV 84.5 10/12/2020    RDW 13.8 10/12/2020     10/12/2020     CMP:  Lab Results   Component Value Date     10/12/2020    K 4.2 10/12/2020     10/12/2020    CO2 24 10/12/2020    BUN 28 10/12/2020    CREATININE 1.0 10/12/2020    GFRAA >60 10/12/2020    AGRATIO 1.6 10/12/2020    LABGLOM >60 10/12/2020    GLUCOSE 164 10/12/2020    PROT 6.5 10/12/2020    CALCIUM 9.6 10/12/2020    BILITOT 1.1 10/12/2020    ALKPHOS 63 10/12/2020    AST 89 10/12/2020     10/12/2020     PT/INR:  No results found for: PTINR  HgBA1c:  Lab Results   Component Value Date    LABA1C 5.8 07/25/2019     No results found for: CKTOTAL, CKMB, Mario Fernandez      Cardiac Data     Last EKG:   10/12/2020 Sinus tach at 110. LBBB (no comparisons)    Echo:    Stress Test:    Cath:    Studies:   CXR:  Perihilar and bibasilar airspace opacities with associated pleural effusions,    slightly worse on the right.  Overall pattern suspected to represent    developing pulmonary edema.  An underlying viral infection can not be    excluded.  Multifocal pneumonia would be considered less likely. Assessment and Plan      1. SOB: CHF suspected   -460mL  2. Hypertension  3. Abnormal EKG. Left bundle branch block   4. elevated LFTs      PLAN  1. Overall suspicious of possible CHF but patient appears well compensated with minor report of net fluid loss. 2.  Waiting echocardiogram to further delineate this  3. Diurese cautiously as does not appear to have a large amount of fluid on exam   -We will wrap his legs to mobilize lower extremity edema   - strict I/O, daily weights, daily renals with diuresis        Patient Active Problem List   Diagnosis    Basal cell carcinoma of right preauricular    Acute heart failure ([facility] [address]           Thank you for allowing us to participate in the care of [address] [place]. Please call me with any questions [phone]. Sonya Wolfe MD, [address] [facility] [employer] Cardiologist  [facility]  [phone] [facility]  [phone] [address] [place] [address]  10/12/2020 3:09 PM    I will address the patient's cardiac risk factors and adjusted pharmacologic treatment as needed. In addition, I have reinforced the need for patient directed risk factor modification. All questions and concerns were addressed to the patient/family. Alternatives to my treatment were discussed. The note was completed using EMR. Every effort was made to ensure accuracy; however, inadvertent computerized transcription errors may be present.

## 2020-10-12 NOTE — H&P
smoked. He has never used smokeless tobacco.  ETOH:   reports current alcohol use. Family History:     Reviewed in detail and negative for DM, CAD, Cancer, CVA. Positive as follows:        Problem Relation Age of Onset    Cancer Mother         skin     Cancer Father         skin, prostate     Cancer Sister         skin, Melanoma       REVIEW OF SYSTEMS:   Pertinent positives as noted in the HPI. All other systems reviewed and negative. PHYSICAL EXAM PERFORMED:  BP (!) 146/81   Pulse 116   Temp 98.2 °F (36.8 °C) (Oral)   Resp 18   Ht 5' 10\" (1.778 m)   Wt 199 lb 12.8 oz (90.6 kg)   SpO2 95%   BMI 28.67 kg/m²     General appearance:  No apparent distress, appears stated age and cooperative. HEENT:  Normal cephalic, atraumatic without obvious deformity. Pupils equal, round, and reactive to light. Extra ocular muscles intact. Conjunctivae/corneas clear. Neck: Supple, with full range of motion. No jugular venous distention. Trachea midline. Respiratory:  Normal respiratory effort. Bibasal crackles bilaterally   cardiovascular:  Regular rate and rhythm with normal S1/S2 without murmurs, rubs or gallops. Abdomen: Soft, non-tender, non-distended with normal bowel sounds. Musculoskeletal:  No clubbing, cyanosis or edema bilaterally. Full range of motion without deformity. Skin: Skin color, texture, turgor normal.  No rashes or lesions. Neurologic:  Neurovascularly intact without any focal sensory/motor deficits.  Cranial nerves: II-XII intact, grossly non-focal.  Psychiatric:  Alert and oriented, thought content appropriate, normal insight  Capillary Refill: Brisk,< 3 seconds   Peripheral Pulses: +2 palpable, equal bilaterally       Labs:   Recent Labs     10/12/20  0743   WBC 11.7*   HGB 14.3   HCT 42.8        Recent Labs     10/12/20  0743      K 4.2      CO2 24   BUN 28*   CREATININE 1.0   CALCIUM 9.6     Recent Labs     10/12/20  0743   AST 89*   *   BILITOT 1.1* ALKPHOS 63     Urinalysis:    Lab Results   Component Value Date    NITRU Negative 10/12/2020    BLOODU Negative 10/12/2020    SPECGRAV 1.020 10/12/2020    GLUCOSEU Negative 10/12/2020       EKG:  I have reviewed the EKG with the following interpretation: Sinus tachycardia with left bundle branch block no prior EKG to compare. Radiology:    I have reviewed the Imaging  with the following interpretation:   XR CHEST PORTABLE   Final Result   Perihilar and bibasilar airspace opacities with associated pleural effusions,   slightly worse on the right. Overall pattern suspected to represent   developing pulmonary edema. An underlying viral infection can not be   excluded. Multifocal pneumonia would be considered less likely. Active Hospital Problems    Diagnosis Date Noted    Acute heart failure ([facility] [address] [I50.9] 10/12/2020     ASSESSMENT/PLAN:  1.  New onset acute CHF , unspecified type  -As evidenced by elevated proBNP 10, 055 and chest x-ray c/w pulmonary edema and significant bilateral pleural effusions  -Admit to [facility] with telemetry  -Received 40 mg x 1 IV Lasix in ED; continue 40 twice daily and monitor strict I's/O, daily weights  -CHF RN consult  -Cardiology consulted from ED -follow recommendations  -Check FLP ; started on low-dose lisinopril. DVT Prophylaxis: Lovenox  Diet: DIET LOW SODIUM 2 GM;  Code Status: Full Code    PT/OT Eval Status: Ambulatory    Dispo -anticipate more than 2 midnight stay in the hospital     Ronit Gamez MD    The note was completed using Dragon -speech recognition software & EMR  . Every effort was made to ensure accuracy; however, inadvertent computerized transcription errors may be present. Thank you Micky Wilkins MD for the opportunity to be involved in this patient's care. If you have any questions or concerns please feel free to contact me at [phone].

## 2020-10-13 LAB
ANION GAP SERPL CALCULATED.3IONS-SCNC: 9 MMOL/L (ref 3–16)
BUN BLDV-MCNC: 28 MG/DL (ref 7–20)
CALCIUM SERPL-MCNC: 8.9 MG/DL (ref 8.3–10.6)
CHLORIDE BLD-SCNC: 99 MMOL/L (ref 99–110)
CHOLESTEROL, TOTAL: 139 MG/DL (ref 0–199)
CO2: 30 MMOL/L (ref 21–32)
CREAT SERPL-MCNC: 1.3 MG/DL (ref 0.9–1.3)
GFR AFRICAN AMERICAN: >60
GFR NON-AFRICAN AMERICAN: 57
GLUCOSE BLD-MCNC: 168 MG/DL (ref 70–99)
HCT VFR BLD CALC: 44.6 % (ref 40.5–52.5)
HDLC SERPL-MCNC: 32 MG/DL (ref 40–60)
HEMOGLOBIN: 14.6 G/DL (ref 13.5–17.5)
LDL CHOLESTEROL CALCULATED: 84 MG/DL
LV EF: 23 %
LVEF MODALITY: NORMAL
MAGNESIUM: 2.1 MG/DL (ref 1.8–2.4)
MCH RBC QN AUTO: 28.3 PG (ref 26–34)
MCHC RBC AUTO-ENTMCNC: 32.8 G/DL (ref 31–36)
MCV RBC AUTO: 86.4 FL (ref 80–100)
PDW BLD-RTO: 13.9 % (ref 12.4–15.4)
PLATELET # BLD: 232 K/UL (ref 135–450)
PMV BLD AUTO: 9.2 FL (ref 5–10.5)
POTASSIUM SERPL-SCNC: 3.5 MMOL/L (ref 3.5–5.1)
RBC # BLD: 5.17 M/UL (ref 4.2–5.9)
SODIUM BLD-SCNC: 138 MMOL/L (ref 136–145)
TRIGL SERPL-MCNC: 114 MG/DL (ref 0–150)
VLDLC SERPL CALC-MCNC: 23 MG/DL
WBC # BLD: 11.2 K/UL (ref 4–11)

## 2020-10-13 PROCEDURE — 85027 COMPLETE CBC AUTOMATED: CPT

## 2020-10-13 PROCEDURE — 99233 SBSQ HOSP IP/OBS HIGH 50: CPT | Performed by: INTERNAL MEDICINE

## 2020-10-13 PROCEDURE — 6370000000 HC RX 637 (ALT 250 FOR IP): Performed by: INTERNAL MEDICINE

## 2020-10-13 PROCEDURE — 80061 LIPID PANEL: CPT

## 2020-10-13 PROCEDURE — 6360000002 HC RX W HCPCS: Performed by: INTERNAL MEDICINE

## 2020-10-13 PROCEDURE — 93306 TTE W/DOPPLER COMPLETE: CPT

## 2020-10-13 PROCEDURE — 36415 COLL VENOUS BLD VENIPUNCTURE: CPT

## 2020-10-13 PROCEDURE — 80048 BASIC METABOLIC PNL TOTAL CA: CPT

## 2020-10-13 PROCEDURE — 1200000000 HC SEMI PRIVATE

## 2020-10-13 PROCEDURE — 83735 ASSAY OF MAGNESIUM: CPT

## 2020-10-13 PROCEDURE — 2580000003 HC RX 258: Performed by: INTERNAL MEDICINE

## 2020-10-13 RX ORDER — METOPROLOL SUCCINATE 25 MG/1
25 TABLET, EXTENDED RELEASE ORAL DAILY
Status: DISCONTINUED | OUTPATIENT
Start: 2020-10-13 | End: 2020-10-16 | Stop reason: HOSPADM

## 2020-10-13 RX ADMIN — Medication 10 ML: at 19:50

## 2020-10-13 RX ADMIN — LISINOPRIL 5 MG: 5 TABLET ORAL at 08:26

## 2020-10-13 RX ADMIN — Medication 10 ML: at 08:27

## 2020-10-13 RX ADMIN — FUROSEMIDE 40 MG: 10 INJECTION, SOLUTION INTRAMUSCULAR; INTRAVENOUS at 08:26

## 2020-10-13 RX ADMIN — METOPROLOL SUCCINATE 25 MG: 25 TABLET, EXTENDED RELEASE ORAL at 18:27

## 2020-10-13 NOTE — PLAN OF CARE
Problem: OXYGENATION/RESPIRATORY FUNCTION  Goal: Patient will maintain patent airway  Outcome: Ongoing     Patient's EF (Ejection Fraction) is not on file    Heart Failure Medications:  Diuretics[de-identified] Furosemide    (One of the following REQUIRED for EF <40%/SYSTOLIC FAILURE but MAY be used in EF% >40%/DIASTOLIC FAILURE)        ACE[de-identified] Lisinopril        ARB[de-identified] None         ARNI[de-identified] None    (Beta Blockers)  NON- Evidenced Based Beta Blocker (for EF% >40%/DIASTOLIC FAILURE): None    Evidenced Based Beta Blocker::(REQUIRED for EF% <40%/SYSTOLIC FAILURE) None  . .................................................................................................................................................. Patient's weights and intake/output reviewed: Yes    Patient's Last Weight: 196 lbs obtained by standing scale. Difference of 3 lbs less than last documented weight. Intake/Output Summary (Last 24 hours) at 10/13/2020 1324  Last data filed at 10/13/2020 1215  Gross per 24 hour   Intake 1330 ml   Output 3525 ml   Net -2195 ml       Comorbidities Reviewed Yes    Patient has no past medical history on file. >>For CHF and Comorbidity documentation on Education Time and Topics, please see Education Tab    Progressive Mobility Assessment:  What is this patient's Current Level of Mobility?: Ambulatory-Up Ad Jovita  How was this patient Mobilized today?:  Up to Toilet/Shower and Up in Room                 With Whom? Self                 Level of Difficulty/Assistance: Independent     Pt resting in bed at this time on room air. Pt denies shortness of breath. Pt with pitting lower extremity edema.      Patient and/or Family's stated Goal of Care this Admission: increase activity tolerance, better understand heart failure and disease management, be more comfortable, and reduce lower extremity edema prior to discharge        :

## 2020-10-13 NOTE — PROGRESS NOTES
End of shift report given to [facility]. Call light within reach, bed in lowest position, no needs at this time.

## 2020-10-13 NOTE — PROGRESS NOTES
ELIZ hose applied bilaterally at this time. Explained importance of elevating feet while up in chair.  Patient verbalized understanding

## 2020-10-13 NOTE — PROGRESS NOTES
Nutrition Education    Patient with family at this time, patient's wife came out of room and talked to this RD about information. Patient's wife stated also a heart patient and aware of guidelines but took the information and appreciated the visit. · Verbally reviewed information with Family  · Educated on CHF nutrition  · Written educational materials provided. · Contact name and number provided. · Refer to Patient Education activity for more details.     Electronically signed by Blanche Caba, [address],  on 10/13/20 at 4:00 PM EDT    Contact: Office: [phone]; [address]: [phone]

## 2020-10-13 NOTE — PROGRESS NOTES
Hospitalist Progress Note      PCP: Fidel Shaver MD    Date of Admission: 10/12/2020    Chief Complaint: [facility] Course:  59 yo WM with no signif prior med hx p/w new onset CHF. Subjective:  States sob/PEREZ much improved, ongoing diuresis, wife at bedside       Medications:  Reviewed    Infusion Medications   Scheduled Medications    sodium chloride flush  10 mL Intravenous 2 times per day    enoxaparin  40 mg Subcutaneous Daily    lisinopril  5 mg Oral Daily    furosemide  40 mg Intravenous BID     PRN Meds: sodium chloride flush, acetaminophen **OR** acetaminophen, polyethylene glycol, promethazine **OR** ondansetron, perflutren lipid microspheres      Intake/Output Summary (Last 24 hours) at 10/13/2020 0850  Last data filed at 10/13/2020 1596  Gross per 24 hour   Intake 1090 ml   Output 2325 ml   Net -1235 ml       Physical Exam Performed:    BP (!) 143/95   Pulse 113   Temp 98.3 °F (36.8 °C) (Oral)   Resp 16   Ht 5' 10\" (1.778 m)   Wt 196 lb 8 oz (89.1 kg)   SpO2 95%   BMI 28.19 kg/m²     General appearance:  No apparent distress, appears stated age and cooperative. HEENT:  Normal cephalic, atraumatic without obvious deformity. Pupils equal, round, and reactive to light. Extra ocular muscles intact. Conjunctivae/corneas clear. Neck: Supple, with full range of motion. No jugular venous distention. Trachea midline. Respiratory:  Normal respiratory effort. Bibasilar crackles bilaterally seem improved  cardiovascular:  Regular rate and rhythm with normal S1/S2 without murmurs, rubs or gallops. Abdomen: Soft, non-tender, non-distended with normal bowel sounds. Musculoskeletal:  No clubbing, cyanosis or edema bilaterally. Full range of motion without deformity. Stockings noted  Skin: Skin color, texture, turgor normal.  No rashes or lesions. Neurologic:  Neurovascularly intact without any focal sensory/motor deficits.  Cranial nerves: II-XII intact, grossly MD

## 2020-10-13 NOTE — CARE COORDINATION
Chart reviewed for possible needs at DC. Pt admitted 10/12/20 for acute heart failure being followed by IM and Cardiology. Per chart, pt from home and IPTA with no indication for PT/OT eval at this time. He has PCP and current insurance listed. No immediate Dc needs identified at this time. Please consult CM should needs arise.      Delmer Marley RN

## 2020-10-13 NOTE — PLAN OF CARE
Patient's EF (Ejection Fraction) is unknown    Heart Failure Medications:   Diuretics[de-identified] Furosemide     (One of the following REQUIRED for EF <40%/SYSTOLIC FAILURE but MAY be used in EF% >40%/DIASTOLIC FAILURE)        ACE[de-identified] Lisinopril        ARB[de-identified] None         ARNI[de-identified] None    (Beta Blockers)   NON- Evidenced Based Beta Blocker (for EF% >40%/DIASTOLIC FAILURE): None     Evidenced Based Beta Blocker::(REQUIRED for EF% <40%/SYSTOLIC FAILURE) None  . .................................................................................................................................................. Patient's weights and intake/output reviewed: Yes    Patient's Last Weight: 199 lbs obtained by standing scale. Admitting weight. Intake/Output Summary (Last 24 hours) at 10/13/2020 0020  Last data filed at 10/12/2020 2243  Gross per 24 hour   Intake 840 ml   Output 1925 ml   Net -1085 ml       Comorbidities Reviewed Yes    Patient has no past medical history on file. >>For CHF and Comorbidity documentation on Education Time and Topics, please see Education Tab    Progressive Mobility Assessment:  What is this patient's Current Level of Mobility?: Ambulatory-Up Ad Jovita  How was this patient Mobilized today?: Edge of Bed, Up to Chair,  Up to Toilet/Shower and Up in Room                 With Whom? Self                 Level of Difficulty/Assistance: Independent     Pt up in chair at this time on room air. Pt denies shortness of breath. Pt with pitting lower extremity edema.      Patient and/or Family's stated Goal of Care this Admission: reduce shortness of breath, increase activity tolerance, better understand heart failure and disease management, be more comfortable and reduce lower extremity edema prior to discharge        :

## 2020-10-13 NOTE — PROGRESS NOTES
deformity  Cardiovascular:RRR, S1S2, no mrg, normal PMI  Abdomen: Soft, NTND, Normal BS  Extremities: No clubbing, cyanosis, or edema  Neurological/Psychiatric: AxO x4, No gross motors/sensory deficits  Skin:  Warm and dry      Labs:  CBC:   Recent Labs     10/12/20  0743 10/13/20  0831   WBC 11.7* 11.2*   HGB 14.3 14.6   HCT 42.8 44.6   MCV 84.5 86.4    232     BMP:   Recent Labs     10/12/20  0743 10/13/20  0831    138   K 4.2 3.5    99   CO2 24 30   BUN 28* 28*   CREATININE 1.0 1.3     MG:    Recent Labs     10/12/20  0743 10/13/20  0831   MG 2.20 2.10      PT/INR: No results for input(s): PROTIME, INR in the last 72 hours. APTT: No results for input(s): APTT in the last 72 hours. Cardiac Enzymes: No results for input(s): CKTOTAL, CKMB, CKMBINDEX, TROPONINI in the last 72 hours. Cardiac Studies:          Assessment and Plan        1. Acute systolic nonischemic Cardiomyopathy: LVEF <20%              --1.5L net negative  2. Hypertension  3. Abnormal EKG. Left bundle branch block   4. elevated LFTs        PLAN  1. New severe systolic cardiomyopathy   - plan for C and RHC tomorrow   -If negative for CAD will then start secondary work-up for cardiomyopathy   - HOLD further diuresis  2. Start toprol   -Continue lisinopril for now likely change to [facility] as outpatient  3. Diurese cautiously as does not appear to have a large amount of fluid on exam              -We will wrap his legs to mobilize lower extremity edema              - strict I/O, daily weights, daily renals with diuresis       Patient Active Problem List   Diagnosis    Basal cell carcinoma of right preauricular    Acute heart failure (HCC)    SOB (shortness of breath)    Left bundle branch block    Essential hypertension    Elevated LFTs         Thank you for allowing me to participate in the care of your patient. Please call me with any questions [phone].       Delma Arias MD, [facility]  Interventional

## 2020-10-14 ENCOUNTER — APPOINTMENT (OUTPATIENT)
Dept: CARDIAC CATH/INVASIVE PROCEDURES | Age: 55
DRG: 286 | End: 2020-10-14
Payer: COMMERCIAL

## 2020-10-14 LAB
ANION GAP SERPL CALCULATED.3IONS-SCNC: 10 MMOL/L (ref 3–16)
BUN BLDV-MCNC: 28 MG/DL (ref 7–20)
CALCIUM IONIZED: 1.17 MMOL/L (ref 1.12–1.32)
CALCIUM SERPL-MCNC: 8.5 MG/DL (ref 8.3–10.6)
CHLORIDE BLD-SCNC: 103 MMOL/L (ref 99–110)
CO2: 28 MMOL/L (ref 21–32)
CREAT SERPL-MCNC: 1 MG/DL (ref 0.9–1.3)
FERRITIN: 299.1 NG/ML (ref 30–400)
GFR AFRICAN AMERICAN: >60
GFR AFRICAN AMERICAN: >60
GFR NON-AFRICAN AMERICAN: >60
GFR NON-AFRICAN AMERICAN: >60
GLUCOSE BLD-MCNC: 120 MG/DL (ref 70–99)
GLUCOSE BLD-MCNC: 123 MG/DL (ref 70–99)
HAV IGM SER IA-ACNC: NORMAL
HEMOGLOBIN: 13.7 GM/DL (ref 13.5–17.5)
HEPATITIS B CORE IGM ANTIBODY: NORMAL
HEPATITIS B SURFACE ANTIGEN INTERPRETATION: NORMAL
HEPATITIS C ANTIBODY INTERPRETATION: NORMAL
IRON SATURATION: 26 % (ref 20–50)
IRON: 75 UG/DL (ref 59–158)
LEFT VENTRICULAR EJECTION FRACTION MODE: NORMAL
LV EF: 15 %
MAGNESIUM: 2.3 MG/DL (ref 1.8–2.4)
PERFORMED ON: ABNORMAL
POC ACT LR: 156 SEC
POC ACT LR: 189 SEC
POC ACT LR: 352 SEC
POC CHLORIDE: 100 MMOL/L (ref 99–110)
POC CREATININE: 1.2 MG/DL (ref 0.9–1.3)
POC HEMATOCRIT: 40 % (ref 40.5–52.5)
POC POTASSIUM: 3.3 MMOL/L (ref 3.5–5.1)
POC SAMPLE TYPE: ABNORMAL
POC SODIUM: 142 MMOL/L (ref 136–145)
POTASSIUM SERPL-SCNC: 3.7 MMOL/L (ref 3.5–5.1)
PROTEIN PROTEIN: 0.02 G/DL
PROTEIN PROTEIN: 17 MG/DL
RHEUMATOID FACTOR: <10 IU/ML
SODIUM BLD-SCNC: 141 MMOL/L (ref 136–145)
TOTAL IRON BINDING CAPACITY: 293 UG/DL (ref 260–445)

## 2020-10-14 PROCEDURE — 86701 HIV-1ANTIBODY: CPT

## 2020-10-14 PROCEDURE — 86038 ANTINUCLEAR ANTIBODIES: CPT

## 2020-10-14 PROCEDURE — 82175 ASSAY OF ARSENIC: CPT

## 2020-10-14 PROCEDURE — 4A023N8 MEASUREMENT OF CARDIAC SAMPLING AND PRESSURE, BILATERAL, PERCUTANEOUS APPROACH: ICD-10-PCS | Performed by: INTERNAL MEDICINE

## 2020-10-14 PROCEDURE — 85347 COAGULATION TIME ACTIVATED: CPT

## 2020-10-14 PROCEDURE — C1769 GUIDE WIRE: HCPCS

## 2020-10-14 PROCEDURE — 84155 ASSAY OF PROTEIN SERUM: CPT

## 2020-10-14 PROCEDURE — 6370000000 HC RX 637 (ALT 250 FOR IP): Performed by: INTERNAL MEDICINE

## 2020-10-14 PROCEDURE — 82565 ASSAY OF CREATININE: CPT

## 2020-10-14 PROCEDURE — 2500000003 HC RX 250 WO HCPCS

## 2020-10-14 PROCEDURE — B2111ZZ FLUOROSCOPY OF MULTIPLE CORONARY ARTERIES USING LOW OSMOLAR CONTRAST: ICD-10-PCS | Performed by: INTERNAL MEDICINE

## 2020-10-14 PROCEDURE — 86702 HIV-2 ANTIBODY: CPT

## 2020-10-14 PROCEDURE — 83735 ASSAY OF MAGNESIUM: CPT

## 2020-10-14 PROCEDURE — 83655 ASSAY OF LEAD: CPT

## 2020-10-14 PROCEDURE — 6360000004 HC RX CONTRAST MEDICATION

## 2020-10-14 PROCEDURE — 87390 HIV-1 AG IA: CPT

## 2020-10-14 PROCEDURE — C1751 CATH, INF, PER/CENT/MIDLINE: HCPCS

## 2020-10-14 PROCEDURE — 83883 ASSAY NEPHELOMETRY NOT SPEC: CPT

## 2020-10-14 PROCEDURE — 82164 ANGIOTENSIN I ENZYME TEST: CPT

## 2020-10-14 PROCEDURE — 84132 ASSAY OF SERUM POTASSIUM: CPT

## 2020-10-14 PROCEDURE — 36415 COLL VENOUS BLD VENIPUNCTURE: CPT

## 2020-10-14 PROCEDURE — 84295 ASSAY OF SERUM SODIUM: CPT

## 2020-10-14 PROCEDURE — C1894 INTRO/SHEATH, NON-LASER: HCPCS

## 2020-10-14 PROCEDURE — 86658 ENTEROVIRUS ANTIBODY: CPT

## 2020-10-14 PROCEDURE — 2709999900 HC NON-CHARGEABLE SUPPLY

## 2020-10-14 PROCEDURE — 92978 ENDOLUMINL IVUS OCT C 1ST: CPT | Performed by: INTERNAL MEDICINE

## 2020-10-14 PROCEDURE — 1200000000 HC SEMI PRIVATE

## 2020-10-14 PROCEDURE — 84166 PROTEIN E-PHORESIS/URINE/CSF: CPT

## 2020-10-14 PROCEDURE — 83825 ASSAY OF MERCURY: CPT

## 2020-10-14 PROCEDURE — 86255 FLUORESCENT ANTIBODY SCREEN: CPT

## 2020-10-14 PROCEDURE — 82435 ASSAY OF BLOOD CHLORIDE: CPT

## 2020-10-14 PROCEDURE — 84156 ASSAY OF PROTEIN URINE: CPT

## 2020-10-14 PROCEDURE — C1753 CATH, INTRAVAS ULTRASOUND: HCPCS

## 2020-10-14 PROCEDURE — 80074 ACUTE HEPATITIS PANEL: CPT

## 2020-10-14 PROCEDURE — 93460 R&L HRT ART/VENTRICLE ANGIO: CPT | Performed by: INTERNAL MEDICINE

## 2020-10-14 PROCEDURE — 86431 RHEUMATOID FACTOR QUANT: CPT

## 2020-10-14 PROCEDURE — 6370000000 HC RX 637 (ALT 250 FOR IP)

## 2020-10-14 PROCEDURE — 82947 ASSAY GLUCOSE BLOOD QUANT: CPT

## 2020-10-14 PROCEDURE — 82728 ASSAY OF FERRITIN: CPT

## 2020-10-14 PROCEDURE — 6360000002 HC RX W HCPCS

## 2020-10-14 PROCEDURE — 85014 HEMATOCRIT: CPT

## 2020-10-14 PROCEDURE — C1887 CATHETER, GUIDING: HCPCS

## 2020-10-14 PROCEDURE — 92978 ENDOLUMINL IVUS OCT C 1ST: CPT

## 2020-10-14 PROCEDURE — B2151ZZ FLUOROSCOPY OF LEFT HEART USING LOW OSMOLAR CONTRAST: ICD-10-PCS | Performed by: INTERNAL MEDICINE

## 2020-10-14 PROCEDURE — 2580000003 HC RX 258: Performed by: INTERNAL MEDICINE

## 2020-10-14 PROCEDURE — 83540 ASSAY OF IRON: CPT

## 2020-10-14 PROCEDURE — 82330 ASSAY OF CALCIUM: CPT

## 2020-10-14 PROCEDURE — 83550 IRON BINDING TEST: CPT

## 2020-10-14 PROCEDURE — 80048 BASIC METABOLIC PNL TOTAL CA: CPT

## 2020-10-14 PROCEDURE — 93460 R&L HRT ART/VENTRICLE ANGIO: CPT

## 2020-10-14 PROCEDURE — 84165 PROTEIN E-PHORESIS SERUM: CPT

## 2020-10-14 RX ORDER — MIDAZOLAM HYDROCHLORIDE 1 MG/ML
INJECTION INTRAMUSCULAR; INTRAVENOUS
Status: COMPLETED | OUTPATIENT
Start: 2020-10-14 | End: 2020-10-14

## 2020-10-14 RX ORDER — HEPARIN SODIUM 1000 [USP'U]/ML
INJECTION, SOLUTION INTRAVENOUS; SUBCUTANEOUS
Status: COMPLETED | OUTPATIENT
Start: 2020-10-14 | End: 2020-10-14

## 2020-10-14 RX ORDER — ASPIRIN 81 MG/1
81 TABLET, CHEWABLE ORAL DAILY
Status: DISCONTINUED | OUTPATIENT
Start: 2020-10-14 | End: 2020-10-16 | Stop reason: HOSPADM

## 2020-10-14 RX ORDER — FENTANYL CITRATE 50 UG/ML
INJECTION, SOLUTION INTRAMUSCULAR; INTRAVENOUS
Status: COMPLETED | OUTPATIENT
Start: 2020-10-14 | End: 2020-10-14

## 2020-10-14 RX ORDER — ASPIRIN 325 MG
TABLET ORAL
Status: COMPLETED | OUTPATIENT
Start: 2020-10-14 | End: 2020-10-14

## 2020-10-14 RX ORDER — LISINOPRIL 10 MG/1
5 TABLET ORAL 2 TIMES DAILY
Status: DISCONTINUED | OUTPATIENT
Start: 2020-10-14 | End: 2020-10-16 | Stop reason: HOSPADM

## 2020-10-14 RX ADMIN — MIDAZOLAM HYDROCHLORIDE 2 MG: 1 INJECTION INTRAMUSCULAR; INTRAVENOUS at 08:21

## 2020-10-14 RX ADMIN — ASPIRIN 81 MG: 81 TABLET, CHEWABLE ORAL at 18:01

## 2020-10-14 RX ADMIN — Medication 325 MG: at 08:22

## 2020-10-14 RX ADMIN — LISINOPRIL 5 MG: 10 TABLET ORAL at 20:33

## 2020-10-14 RX ADMIN — LISINOPRIL 5 MG: 5 TABLET ORAL at 07:01

## 2020-10-14 RX ADMIN — HEPARIN SODIUM 4400 UNITS: 1000 INJECTION, SOLUTION INTRAVENOUS; SUBCUTANEOUS at 08:31

## 2020-10-14 RX ADMIN — Medication 10 ML: at 20:34

## 2020-10-14 RX ADMIN — HEPARIN SODIUM 2000 UNITS: 1000 INJECTION, SOLUTION INTRAVENOUS; SUBCUTANEOUS at 08:36

## 2020-10-14 RX ADMIN — FENTANYL CITRATE 25 MCG: 50 INJECTION, SOLUTION INTRAMUSCULAR; INTRAVENOUS at 08:21

## 2020-10-14 RX ADMIN — METOPROLOL SUCCINATE 25 MG: 25 TABLET, EXTENDED RELEASE ORAL at 07:01

## 2020-10-14 ASSESSMENT — PAIN SCALES - GENERAL
PAINLEVEL_OUTOF10: 0
PAINLEVEL_OUTOF10: 0

## 2020-10-14 NOTE — PROGRESS NOTES
Alisha Hernandez;  10/14/20 1:06 PM   [phone] Hospital or Facility: [facility] From: Marquita Stovall RE: Darlinpankaj Angie 1965 RM: 54 FYI pt is back from [place]. Yaya ordered a BUNCH of blood work. Doesn't look like he signed off for discharge.  Please advise, thank you

## 2020-10-14 NOTE — PROGRESS NOTES
Hospitalist Progress Note      PCP: Yen Muse MD    Date of Admission: 10/12/2020    Chief Complaint:  sob     Hospital Course:  61 yo WM with no signif prior med hx p/w new onset CHF. Cards consulted. RHC/LHC performed.     Subjective:  no overnight issues, currently awaiting procedure in cath lab, denies sob/cp, wife not currently at bedside          Medications:  Reviewed    Infusion Medications   Scheduled Medications    metoprolol succinate  25 mg Oral Daily    sodium chloride flush  10 mL Intravenous 2 times per day    enoxaparin  40 mg Subcutaneous Daily    lisinopril  5 mg Oral Daily     PRN Meds: sodium chloride flush, acetaminophen **OR** acetaminophen, polyethylene glycol, promethazine **OR** ondansetron, perflutren lipid microspheres      Intake/Output Summary (Last 24 hours) at 10/14/2020 0726  Last data filed at 10/14/2020 2869  Gross per 24 hour   Intake 850 ml   Output 2275 ml   Net -1425 ml       Physical Exam Performed:    BP (!) 132/95   Pulse 108   Temp 97.9 °F (36.6 °C) (Oral)   Resp 18   Ht 5' 10\" (1.778 m)   Wt 193 lb 11.2 oz (87.9 kg)   SpO2 93%   BMI 27.79 kg/m²     General appearance:  No apparent distress, appears stated age and cooperative. HEENT:  Normal cephalic, atraumatic without obvious deformity. Pupils equal, round, and reactive to light.  Extra ocular muscles intact. Conjunctivae/corneas clear. Neck: Supple, with full range of motion. No jugular venous distention. Trachea midline. Respiratory:  Normal respiratory effort.  Bibasilar crackles bilaterally seem resolved  cardiovascular:  Regular rate and rhythm with normal S1/S2 without murmurs, rubs or gallops. Abdomen: Soft, non-tender, non-distended with normal bowel sounds. Musculoskeletal:  No clubbing, cyanosis or edema bilaterally.  Full range of motion without deformity. Stockings noted  Skin: Skin color, texture, turgor normal.  No rashes or lesions.   Neurologic:  Neurovascularly intact without cardiomyopathy(20%, mod mr, diffuse hk noted, RV mildly enlarged, mild tr/pulm regurg, left pleural effusion)  -LHC/RHC  planned    DVT Prophylaxis: lovenox  Diet: Diet NPO Time Specified  Code Status: Full Code    PT/OT Eval Status: ambulatory    Dispo - pending Osiel Palomares MD

## 2020-10-14 NOTE — PROCEDURES
Brief Pre-Op Note/Sedation Assessment      Jesusita Shi  1965  Cath Pool Rm/NONE      [id number]  7:33 AM    Planned Procedure: Cardiac Catheterization Procedure    Post Procedure Plan: Return to same level of care    Consent: I have discussed with the patient and/or the patient representative the indication, alternatives, and the possible risks and/or complications of the planned procedure and the anesthesia methods. The patient and/or patient representative appear to understand and agree to proceed. Chief Complaint: Anginal Equivalent  Dyspnea on Exertion      Indications for Cath Procedure:  ACS > 24 hrs, Cardiomyopathy and LV Dysfunction  Anginal Classification within 2 weeks:  CCS III - Symptoms with everyday living activities, i.e., moderate limitation  NYHA Heart Failure Class within 2 weeks: Class III - Symptoms of HF on less-than-ordinary exertion, Newly Diagnosed? Yes, Heart Failure Type: Systolic  Is Cath Lab Visit Valve-related?: No  Surgical Risk: N/A  Functional Type: >= 4 METS with symptoms    Anti- Anginal Meds within 2 weeks:   Yes: Beta Blockers and Aspirin    Stress or Imaging Studies Performed (within 6 months):  None     Vital Signs:  BP (!) 132/95   Pulse 108   Temp 97.9 °F (36.6 °C) (Oral)   Resp 18   Ht 5' 10\" (1.778 m)   Wt 193 lb 11.2 oz (87.9 kg)   SpO2 93%   BMI 27.79 kg/m²     Allergies:  No Known Allergies    Past Medical History:  History reviewed. No pertinent past medical history. Surgical History:  History reviewed. No pertinent surgical history.       Medications:  Current Facility-Administered Medications   Medication Dose Route Frequency Provider Last Rate Last Dose    metoprolol succinate (TOPROL XL) extended release tablet 25 mg  25 mg Oral Daily Claire Johnson MD   25 mg at 10/14/20 0701    sodium chloride flush 0.9 % injection 10 mL  10 mL Intravenous 2 times per day Shirley Chirinos MD   10 mL at 10/13/20 1950    sodium chloride flush 0.9 % injection 10 mL  10 mL Intravenous PRN Saurabh Lambert MD        acetaminophen (TYLENOL) tablet 650 mg  650 mg Oral Q6H PRN Saurabh Lambert MD        Or    acetaminophen (TYLENOL) suppository 650 mg  650 mg Rectal Q6H PRN Saurabh Lambert MD        polyethylene glycol (GLYCOLAX) packet 17 g  17 g Oral Daily PRN Saurabh Lambert MD        promethazine (PHENERGAN) tablet 12.5 mg  12.5 mg Oral Q6H PRN Saurabh Lambert MD        Or    ondansetron (ZOFRAN) injection 4 mg  4 mg Intravenous Q6H PRN Saurabh Lambert MD        enoxaparin (LOVENOX) injection 40 mg  40 mg Subcutaneous Daily Saurabh Lambert MD        lisinopril (PRINIVIL;ZESTRIL) tablet 5 mg  5 mg Oral Daily Suarabh Lambert MD   5 mg at 10/14/20 0701    perflutren lipid microspheres (DEFINITY) injection 1.65 mg  1.5 mL Intravenous ONCE PRN Saurabh Lambert MD               Pre-Sedation:    Pre-Sedation Documentation and Exam:  I have assessed the patient and agree with the H&P present on the chart. Prior History of Anesthesia Complications:   none    Modified Mallampati:  II (soft palate, uvula, fauces visible)    ASA Classification:  Class 3 - A patient with severe systemic disease that limits activity but is not incapacitating      Aydee Scale: Activity:  2 - Able to move 4 extremities voluntarily on command  Respiration:  2 - Able to breathe deeply and cough freely  Circulation:  2 - BP+/- 20mmHg of normal  Consciousness:  2 - Fully awake  Oxygen Saturation (color):  2 - Able to maintain oxygen saturation >92% on room air    Sedation/Anesthesia Plan:  Guard the patient's safety and welfare. Minimize physical discomfort and pain. Minimize negative psychological responses to treatment by providing sedation and analgesia and maximize the potential amnesia. Patient to meet pre-procedure discharge plan.     Medication Planned:  midazolam intravenously and fentanyl intravenously    Patient is an appropriate candidate for plan of sedation: yes      Electronically signed by Daniella Santacruz MD on 10/14/2020 at 7:33 AM

## 2020-10-14 NOTE — PROGRESS NOTES
End of shift report given to Ximena Mccoy RN at bedside. Patient alert and oriented. Bed in lowest position with wheels locked. Call light within reach. No further needs at this time.

## 2020-10-14 NOTE — PROGRESS NOTES
Called cardio office; Asked if pt needs to stay BR and if pt needed to be restarted on IV Lasix. Callback number given.

## 2020-10-14 NOTE — PROGRESS NOTES
Patient is awake, alert and oriented x4. Assessment is complete, see flow sheet. Bed in lowest position, wheels locked, call light is within reach. Patient denies any further needs at the moment. Will continue to monitor.   Vitals:    10/14/20 1153   BP: (!) 131/99   Pulse: 89   Resp: 16   Temp: 98.2 °F (36.8 °C)   SpO2: 93%     Denies pain, on room air  Pt back from the cath lab, CMU aware, R radial band is off  No redness, blood, or drainage at both sites

## 2020-10-14 NOTE — PLAN OF CARE
Patient's EF (Ejection Fraction) is less than 40%    Heart Failure Medications:   Diuretics[de-identified] None On hold.  (One of the following REQUIRED for EF <40%/SYSTOLIC FAILURE but MAY be used in EF% >40%/DIASTOLIC FAILURE)        ACE[de-identified] Lisinopril        ARB[de-identified] None         ARNI[de-identified] None    (Beta Blockers)   NON- Evidenced Based Beta Blocker (for EF% >40%/DIASTOLIC FAILURE): None     Evidenced Based Beta Blocker::(REQUIRED for EF% <40%/SYSTOLIC FAILURE) Metoprolol SUCCinate- Toprol XL  . .................................................................................................................................................. Patient's weights and intake/output reviewed: Yes    Patient's Last Weight: 196 lbs obtained by standing scale. Difference of 3 lbs less than last documented weight. Intake/Output Summary (Last 24 hours) at 10/13/2020 2320  Last data filed at 10/13/2020 2001  Gross per 24 hour   Intake 850 ml   Output 2375 ml   Net -1525 ml       Comorbidities Reviewed Yes    Patient has no past medical history on file. >>For CHF and Comorbidity documentation on Education Time and Topics, please see Education Tab    Progressive Mobility Assessment:  What is this patient's Current Level of Mobility?: Ambulatory-Up Ad Jovita  How was this patient Mobilized today?: Up to Chair                 With Whom? Self                 Level of Difficulty/Assistance: Independent     Pt up in chair at this time on room air. Pt denies shortness of breath. Pt with pitting lower extremity edema.      Patient and/or Family's stated Goal of Care this Admission: reduce shortness of breath, increase activity tolerance, better understand heart failure and disease management, be more comfortable and reduce lower extremity edema prior to discharge        :

## 2020-10-14 NOTE — PROGRESS NOTES
Pt given AM medications and instructed to remove all clothing besides gown before transport to cath lab.

## 2020-10-14 NOTE — BRIEF OP NOTE
Brief Postoperative Note      Patient: Stacy Ford  YOB: 1965  MRN: [id number]    Brief Postoperative Note  Pre-operative Diagnosis: cardiomyopathy  Post-operative Diagnosis: Same  Procedure: Moderate sedation  US guided access to Rt radial artery  RHC  LHC  LVG   Bilateral cors  IVUS of left main      Anesthesia: Moderate Sedation  Surgeons/Assistants: Margarite Lanes, MD, [facility]  Estimated Blood Loss: less than 50   Complications: None  Specimens: Was Not Obtained    Findings:     LEFT HEART CATH  LM: ostial pinch (MLA by IVUS was >30)  LAD: luminals, luminals calcification  Ramus: luminals  LCX: Luminals  RCA: dominant, sluggish flow. KEVIN-2 flow. LVEDP: 15  LVEF: <20%, severe global hypokinesis. No MR, No AS      RIGHT HEART CATH  RA: 6   RV: 33/6   PA: 33/15   and mean of  22  PCWP: 15    Sats: on RA  Ao: 89%  RA:  59%  PA: 61%    CO/CI: 4.5/2.2 (stephanie)  SVR/PVR: 1272/122      Assessment  1. Severe nonischemic cardiomyopathy. LVEF less than 20%   -We will start secondary work-up   - will consider LifeVest  2. No significant CAD. Luminal calcification  3.  Relatively normal mildly elevated intracardiac filling pressures.           Electronically signed by Josue Nye MD on 10/14/2020 at 8:53 AM

## 2020-10-14 NOTE — PLAN OF CARE
Problem: Falls - Risk of:  Goal: Will remain free from falls  Description: Will remain free from falls  Outcome: Ongoing  Note: Pt is medium fall risk. Pt is a selfer. Call light within reach. Bed in low position. Will continue to monitor. Problem: HEMODYNAMIC STATUS  Goal: Patient has stable vital signs and fluid balance  Outcome: Ongoing  Note:   Patient's EF (Ejection Fraction) is less than 40%    Heart Failure Medications:  Diuretics[de-identified] None    (One of the following REQUIRED for EF <40%/SYSTOLIC FAILURE but MAY be used in EF% >40%/DIASTOLIC FAILURE)        ACE[de-identified] Lisinopril        ARB[de-identified] None         ARNI[de-identified] None    (Beta Blockers)  NON- Evidenced Based Beta Blocker (for EF% >40%/DIASTOLIC FAILURE): None    Evidenced Based Beta Blocker::(REQUIRED for EF% <40%/SYSTOLIC FAILURE) Metoprolol SUCCinate- Toprol XL  . .................................................................................................................................................. Patient's weights and intake/output reviewed: Yes    Patient's Last Weight: 193 lbs obtained by standing scale. Difference of 3 lbs less than last documented weight. Intake/Output Summary (Last 24 hours) at 10/14/2020 1913  Last data filed at 10/14/2020 1355  Gross per 24 hour   Intake 1000 ml   Output 575 ml   Net 425 ml       Comorbidities Reviewed Yes    Patient has no past medical history on file. >>For CHF and Comorbidity documentation on Education Time and Topics, please see Education Tab    Progressive Mobility Assessment:  What is this patient's Current Level of Mobility?: Ambulatory-Up Ad Jovita  How was this patient Mobilized today?: Edge of Bed, Up to Chair,  Up to Toilet/Shower, and Up in Room                 With Whom? Self                 Level of Difficulty/Assistance: Independent     Pt resting in bed at this time on room air. Pt denies shortness of breath. Pt with pitting lower extremity edema.      Patient and/or Family's stated Goal of Care this Admission: increase activity tolerance, better understand heart failure and disease management, be more comfortable, and reduce lower extremity edema prior to discharge        :       Problem: Pain:  Goal: Pain level will decrease  Description: Pain level will decrease  Outcome: Ongoing  Note: Patient's pain level controlled at this time. Will continue to monitor. Problem: Skin Integrity:  Goal: Will show no infection signs and symptoms  Description: Will show no infection signs and symptoms  Outcome: Ongoing  Note: Patient's skin assessed. See flowsheet. Patient turned in bed. Pressure ulcer prevention in place. No issues with skin integrity this shift. Will continue to monitor.

## 2020-10-14 NOTE — PROGRESS NOTES
End of shift report given to Latricia Scott RN. Patient alert and oriented. Bed in lowest position with wheels locked. Call light within reach.  No further needs at this time. [facility]

## 2020-10-15 ENCOUNTER — TELEPHONE (OUTPATIENT)
Dept: CARDIOLOGY CLINIC | Age: 55
End: 2020-10-15

## 2020-10-15 PROBLEM — I42.8 NON-ISCHEMIC CARDIOMYOPATHY (HCC): Status: ACTIVE | Noted: 2020-10-15

## 2020-10-15 LAB
ANION GAP SERPL CALCULATED.3IONS-SCNC: 8 MMOL/L (ref 3–16)
ANTI-NUCLEAR ANTIBODY (ANA): NEGATIVE
BUN BLDV-MCNC: 25 MG/DL (ref 7–20)
CALCIUM SERPL-MCNC: 8.4 MG/DL (ref 8.3–10.6)
CHLORIDE BLD-SCNC: 102 MMOL/L (ref 99–110)
CO2: 30 MMOL/L (ref 21–32)
CREAT SERPL-MCNC: 1.1 MG/DL (ref 0.9–1.3)
GFR AFRICAN AMERICAN: >60
GFR NON-AFRICAN AMERICAN: >60
GLUCOSE BLD-MCNC: 143 MG/DL (ref 70–99)
HCT VFR BLD CALC: 43.1 % (ref 40.5–52.5)
HEMOGLOBIN: 14.4 G/DL (ref 13.5–17.5)
HIV AG/AB: NORMAL
HIV ANTIGEN: NORMAL
HIV-1 ANTIBODY: NORMAL
HIV-2 AB: NORMAL
MAGNESIUM: 2.5 MG/DL (ref 1.8–2.4)
MCH RBC QN AUTO: 28.7 PG (ref 26–34)
MCHC RBC AUTO-ENTMCNC: 33.3 G/DL (ref 31–36)
MCV RBC AUTO: 86.2 FL (ref 80–100)
PDW BLD-RTO: 13.7 % (ref 12.4–15.4)
PLATELET # BLD: 208 K/UL (ref 135–450)
PMV BLD AUTO: 9.4 FL (ref 5–10.5)
POTASSIUM REFLEX MAGNESIUM: 3.6 MMOL/L (ref 3.5–5.1)
POTASSIUM SERPL-SCNC: 3.6 MMOL/L (ref 3.5–5.1)
PRO-BNP: 2044 PG/ML (ref 0–124)
RBC # BLD: 5 M/UL (ref 4.2–5.9)
SODIUM BLD-SCNC: 140 MMOL/L (ref 136–145)
WBC # BLD: 9.5 K/UL (ref 4–11)

## 2020-10-15 PROCEDURE — 83880 ASSAY OF NATRIURETIC PEPTIDE: CPT

## 2020-10-15 PROCEDURE — 83735 ASSAY OF MAGNESIUM: CPT

## 2020-10-15 PROCEDURE — 85027 COMPLETE CBC AUTOMATED: CPT

## 2020-10-15 PROCEDURE — 80048 BASIC METABOLIC PNL TOTAL CA: CPT

## 2020-10-15 PROCEDURE — 99233 SBSQ HOSP IP/OBS HIGH 50: CPT | Performed by: NURSE PRACTITIONER

## 2020-10-15 PROCEDURE — 6370000000 HC RX 637 (ALT 250 FOR IP): Performed by: INTERNAL MEDICINE

## 2020-10-15 PROCEDURE — 36415 COLL VENOUS BLD VENIPUNCTURE: CPT

## 2020-10-15 PROCEDURE — 2580000003 HC RX 258: Performed by: INTERNAL MEDICINE

## 2020-10-15 PROCEDURE — 1200000000 HC SEMI PRIVATE

## 2020-10-15 RX ORDER — METOPROLOL SUCCINATE 25 MG/1
25 TABLET, EXTENDED RELEASE ORAL DAILY
Qty: 30 TABLET | Refills: 3 | Status: SHIPPED | OUTPATIENT
Start: 2020-10-16 | End: 2020-12-31 | Stop reason: SDUPTHER

## 2020-10-15 RX ORDER — ASPIRIN 81 MG/1
81 TABLET, CHEWABLE ORAL DAILY
Qty: 30 TABLET | Refills: 3 | Status: SHIPPED | OUTPATIENT
Start: 2020-10-16 | End: 2021-03-08

## 2020-10-15 RX ORDER — LISINOPRIL 5 MG/1
5 TABLET ORAL 2 TIMES DAILY
Qty: 60 TABLET | Refills: 3 | Status: SHIPPED | OUTPATIENT
Start: 2020-10-15 | End: 2020-10-28

## 2020-10-15 RX ADMIN — METOPROLOL SUCCINATE 25 MG: 25 TABLET, EXTENDED RELEASE ORAL at 08:24

## 2020-10-15 RX ADMIN — Medication 10 ML: at 08:27

## 2020-10-15 RX ADMIN — ASPIRIN 81 MG: 81 TABLET, CHEWABLE ORAL at 08:24

## 2020-10-15 RX ADMIN — LISINOPRIL 5 MG: 10 TABLET ORAL at 08:24

## 2020-10-15 RX ADMIN — LISINOPRIL 5 MG: 10 TABLET ORAL at 21:01

## 2020-10-15 RX ADMIN — Medication 10 ML: at 21:01

## 2020-10-15 ASSESSMENT — PAIN SCALES - GENERAL
PAINLEVEL_OUTOF10: 0

## 2020-10-15 NOTE — PROGRESS NOTES
Physician Progress Note      Sayra Hughes  [facility] #:                  [id number]  :                       1965  ADMIT DATE:       10/12/2020 6:57 AM  [address] [place] DATE:  RESPONDING  PROVIDER #:        Delora Heimlich MD          QUERY TEXT:    Dear attending provider,    Pt admitted with new onset acute CHF. Per 10/13 ECHO: EF 20-25%. If   possible, please document in progress notes and discharge summary further   specificity regarding the likely type of acute CHF:    The medical record reflects the following:  Risk Factors: 60 y. o. male w/new onset acute CHF  Clinical Indicators: SOB, per H&P: Pedal edema.   Per 10/12 to present nursing   POC: pitting lower extremity edema Treatment: Lasix IV in ED, lisinopril,   metoprolol, PO lasix  Options provided:  -- Acute Systolic CHF/HFrEF  -- Other - I will add my own diagnosis  -- Disagree - Not applicable / Not valid  -- Disagree - Clinically unable to determine / Unknown  -- Refer to Clinical Documentation Reviewer    PROVIDER RESPONSE TEXT:    In notes    Query created by: Shahana Waters on 10/15/2020 11:06 AM      Electronically signed by:  Delora Heimlich MD 10/15/2020 12:09 PM

## 2020-10-15 NOTE — PROGRESS NOTES
Paged Dr. Michelle Rojas;  10/15/20 2:18 PM   [phone] Hospital or Facility: [facility] From: Keon Waters RE: Laurie Christianson 1965 RM: 202 Just wanted to check in and see if pt was still going to discharge. I see a note from Yaya from 12:11, but looks funky so I wasn't sure.  Please advise, thank you

## 2020-10-15 NOTE — PROGRESS NOTES
Hospitalist Progress Note      PCP: Aston Rodriguez MD    Date of Admission: 10/12/2020     Chief Complaint:  [address] yo WM with no signif prior med hx p/w new onset CHF. Cards consulted. RHC/LHC performed.     Subjective:  no overnight issues, denies sob/cp, wife currently at bedside , awaiting lifevest       Medications:  Reviewed    Infusion Medications   Scheduled Medications    lisinopril  5 mg Oral BID    aspirin  81 mg Oral Daily    metoprolol succinate  25 mg Oral Daily    sodium chloride flush  10 mL Intravenous 2 times per day    enoxaparin  40 mg Subcutaneous Daily     PRN Meds: sodium chloride flush, acetaminophen **OR** acetaminophen, polyethylene glycol, promethazine **OR** ondansetron, perflutren lipid microspheres      Intake/Output Summary (Last 24 hours) at 10/15/2020 0842  Last data filed at 10/15/2020 0827  Gross per 24 hour   Intake 1010 ml   Output 900 ml   Net 110 ml       Physical Exam Performed:    /77   Pulse 102   Temp 97.9 °F (36.6 °C) (Oral)   Resp 17   Ht 5' 10\" (1.778 m)   Wt 195 lb 12.8 oz (88.8 kg)   SpO2 96%   BMI 28.09 kg/m²     General appearance:  No apparent distress, appears stated age and cooperative. HEENT:  Normal cephalic, atraumatic without obvious deformity. Pupils equal, round, and reactive to light.  Extra ocular muscles intact. Conjunctivae/corneas clear. Neck: Supple, with full range of motion. No jugular venous distention. Trachea midline. Respiratory:  Normal respiratory effort.  Bibasilar crackles bilaterally seem resolved  cardiovascular:  Regular rate and rhythm with normal S1/S2 without murmurs, rubs or gallops. Abdomen: Soft, non-tender, non-distended with normal bowel sounds. Musculoskeletal:  No clubbing, cyanosis or edema bilaterally.  Full range of motion without deformity. Stockings noted  Skin: Skin color, texture, turgor normal.  No rashes or lesions.   Neurologic:  Neurovascularly intact without any consulted from ED -follow recommendations  -Check FLP ; started on low-dose lisinopril.  -bb started  -echo 10/13  noted new cardiomyopathy(20%, mod mr, diffuse hk noted, RV mildly enlarged, mild tr/pulm regurg, left pleural effusion)  -LHC/RHC  done 10/14, nonischemic CM noted(HIV neg, Hep panel neg), coxsackie virus pending  -lifevest being arranged       DVT Prophylaxis: lovenox  Diet: DIET CARDIAC;  Code Status: Full Code    PT/OT Eval Status: ambulatory    Dispo - per cards and procurement of Bony Bryan MD

## 2020-10-15 NOTE — PROGRESS NOTES
End of shift report given to Kathryn Longo RN. Patient alert and oriented. Bed in lowest position with wheels locked. Call light within reach.  No further needs at this time. [facility]

## 2020-10-15 NOTE — PLAN OF CARE
of Care this Admission: increase activity tolerance, better understand heart failure and disease management, be more comfortable, and reduce lower extremity edema prior to discharge        :       Problem: Pain:  Goal: Pain level will decrease  Description: Pain level will decrease  Outcome: Ongoing  Note: Patient's pain level controlled at this time. Will continue to monitor. Problem: Skin Integrity:  Goal: Will show no infection signs and symptoms  Description: Will show no infection signs and symptoms  Outcome: Ongoing  Note: Patient's skin assessed. See flowsheet. Patient turned in bed. Pressure ulcer prevention in place. No issues with skin integrity this shift. Will continue to monitor.

## 2020-10-15 NOTE — PROGRESS NOTES
20  PCWP: 15   Sats: on RA  Ao: 89%  RA:  59%  PA: 61%   CO/CI: 4.5/2.2 (stephanie)  SVR/PVR: 1272/122   Assessment  1. Severe nonischemic cardiomyopathy. LVEF less than 20%                -We will start secondary work-up                - will consider LifeVest  2. No significant CAD. Luminal calcification  3.  Relatively normal mildly elevated intracardiac filling pressures. ECHO 10/13/20:   Summary   The left ventricular systolic function is severely reduced with an ejection fraction of 20 - 25 %. There is hypokinesis of the apex, apical lateral, apical septum, anterior, inferior, anteroseptum and apical anterior walls. Left ventricular cavity size is mildly dilated. Left ventricular diastolic filling pressure is elevated. Moderate mitral regurgitation. Mild Bi-atrial enlargement. The right ventricle is mildly enlarged. Right ventricular systolic function is mildly reduced . Mild tricuspid and pulmonic regurgitation. There is a left pleural effusion.

## 2020-10-15 NOTE — TELEPHONE ENCOUNTER
Patient's wife called stating the patient's insurance is requiring a [id number] for a life vest. Patient is currently admitted and the life vest rep told the patient's wife that the patient cannot be discharged without the life vest.    Janelle:  [phone]  Reference # [id number]

## 2020-10-16 VITALS
HEART RATE: 98 BPM | SYSTOLIC BLOOD PRESSURE: 103 MMHG | HEIGHT: 70 IN | WEIGHT: 195 LBS | OXYGEN SATURATION: 95 % | RESPIRATION RATE: 18 BRPM | BODY MASS INDEX: 27.92 KG/M2 | DIASTOLIC BLOOD PRESSURE: 73 MMHG | TEMPERATURE: 98.8 F

## 2020-10-16 LAB
ALBUMIN SERPL-MCNC: 2.8 G/DL (ref 3.1–4.9)
ALPHA-1-GLOBULIN: 0.2 G/DL (ref 0.2–0.4)
ALPHA-2-GLOBULIN: 0.8 G/DL (ref 0.4–1.1)
ANCA IFA: NORMAL
ANION GAP SERPL CALCULATED.3IONS-SCNC: 9 MMOL/L (ref 3–16)
BETA GLOBULIN: 1 G/DL (ref 0.9–1.6)
BUN BLDV-MCNC: 20 MG/DL (ref 7–20)
CALCIUM SERPL-MCNC: 8.4 MG/DL (ref 8.3–10.6)
CHLORIDE BLD-SCNC: 103 MMOL/L (ref 99–110)
CO2: 27 MMOL/L (ref 21–32)
CREAT SERPL-MCNC: 1 MG/DL (ref 0.9–1.3)
GAMMA GLOBULIN: 0.6 G/DL (ref 0.6–1.8)
GFR AFRICAN AMERICAN: >60
GFR NON-AFRICAN AMERICAN: >60
GLUCOSE BLD-MCNC: 115 MG/DL (ref 70–99)
KAPPA, FREE LIGHT CHAINS, SERUM: 9.75 MG/L (ref 3.3–19.4)
KAPPA/LAMBDA RATIO: 0.99 (ref 0.26–1.65)
KAPPA/LAMBDA TEST COMMENT: NORMAL
LAMBDA, FREE LIGHT CHAINS, SERUM: 9.89 MG/L (ref 5.71–26.3)
MAGNESIUM: 2.6 MG/DL (ref 1.8–2.4)
POTASSIUM SERPL-SCNC: 4 MMOL/L (ref 3.5–5.1)
SODIUM BLD-SCNC: 139 MMOL/L (ref 136–145)
SPE/IFE INTERPRETATION: NORMAL
TOTAL PROTEIN: 5.3 G/DL (ref 6.4–8.2)
URINE ELECTROPHORESIS INTERP: NORMAL

## 2020-10-16 PROCEDURE — 83735 ASSAY OF MAGNESIUM: CPT

## 2020-10-16 PROCEDURE — 80048 BASIC METABOLIC PNL TOTAL CA: CPT

## 2020-10-16 PROCEDURE — 6370000000 HC RX 637 (ALT 250 FOR IP): Performed by: INTERNAL MEDICINE

## 2020-10-16 PROCEDURE — 36415 COLL VENOUS BLD VENIPUNCTURE: CPT

## 2020-10-16 PROCEDURE — 99232 SBSQ HOSP IP/OBS MODERATE 35: CPT | Performed by: NURSE PRACTITIONER

## 2020-10-16 RX ADMIN — LISINOPRIL 5 MG: 10 TABLET ORAL at 09:58

## 2020-10-16 RX ADMIN — METOPROLOL SUCCINATE 25 MG: 25 TABLET, EXTENDED RELEASE ORAL at 09:58

## 2020-10-16 RX ADMIN — ASPIRIN 81 MG: 81 TABLET, CHEWABLE ORAL at 09:58

## 2020-10-16 NOTE — PROGRESS NOTES
[address]   Daily Progress Note    Admit Date:  10/12/2020  HPI:    Chief Complaint   Patient presents with    Shortness of Breath     chest congestion started last week and then on Saturday he noticed the shortness of breath with swollen ankles.  Foot Swelling      Presented with shortness of breath worsening over past 2 weeks, exertional, + orthopnea, edema, chest pain and weight gain. No past medical history  CXR with congestion. Elevated BNP. Echo with EF 20%. R/LHC with no obstructive coronary artery disease, mildly elevated filling pressures, severe LV dysfunction. Started on medical therapy (lisinopril and Toprol). Ordered LifeVest - authorization pending. Subjective:  Mr. Miguel Concepcion up in room. Reviewed medications, activity recommendations, advised to not drive until seen in office and will monitor for any alerts/ discharges from LifeVest.       Objective:   /73   Pulse 98   Temp 98.8 °F (37.1 °C) (Oral)   Resp 18   Ht 5' 10\" (1.778 m)   Wt 195 lb (88.5 kg)   SpO2 95%   BMI 27.98 kg/m²       Intake/Output Summary (Last 24 hours) at 10/16/2020 1450  Last data filed at 10/16/2020 1325  Gross per 24 hour   Intake 840 ml   Output 150 ml   Net 690 ml     Wt Readings from Last 3 Encounters:   10/16/20 195 lb (88.5 kg)   07/27/20 190 lb (86.2 kg)   07/25/19 187 lb (84.8 kg)         ASSESSMENT:   1. CHF, acute systolic - mild edema stable, otherwise euvolemic on exam and by symptoms  2. Cardiomyopathy, non-ischemic - etiology not clear, no ischemia, secondary work up thus far negative, on lisinopril 5 mg bid and Toprol 25 mg qd  3. HTN - BP stable to low  4. LBBB - unknown duration  5. NSVT -       PLAN:  1. Continue lisinopril 5 mg bid and Toprol 25 mg daily  2. No indication for diuretic at this time  3. LifeVest denied by Janelle, patient and wife agreeable to placement of LifeVest with plans for appeals  4. Okay for discharge from cardiac perspective.  Will review cardiac medications on discharge medication reconciliation form. Patient has follow up appointment with Dr. Denise Cristina in 2 weeks. LELAND Taylor - CNP, 10/16/2020, 2:50 PM  [address]   [phone]       Telemetry: SR 80-90's, PVC's in pairs, NSVT   NYHA: III    Physical Exam:  General:  Awake, alert, NAD  Skin:  Warm and dry  Neck:  JVP 8 cm upright  Chest:  Clear to auscultation   Cardiovascular:  RRR, normal S1S2, no m/g/r  Abdomen:  Soft, nontender, +bowel sounds  Extremities:  1+ non-pitting BLE edema, R>L  right radial site without ooze, bruise or hematoma, dressing C,D,I, 2+ pulse - dressing removed      Medications:    lisinopril  5 mg Oral BID    aspirin  81 mg Oral Daily    metoprolol succinate  25 mg Oral Daily    sodium chloride flush  10 mL Intravenous 2 times per day    enoxaparin  40 mg Subcutaneous Daily         Lab Data: Lab results independently reviewed by myself 10/15/20   CBC:   Recent Labs     10/14/20  0825 10/15/20  0729   WBC  --  9.5   HGB 13.7 14.4   PLT  --  208     BMP:    Recent Labs     10/14/20  1211 10/15/20  0729 10/16/20  0814    140 139   K 3.7 3.6  3.6 4.0   CO2 28 30 27   BUN 28* 25* 20   CREATININE 1.0 1.1 1.0     INR:  No results for input(s): INR in the last 72 hours. BNP:    Recent Labs     10/15/20  0729   PROBNP 2,044*     Cardiac Enzymes: No results for input(s): TROPONINI in the last 72 hours. Lipids:   Lab Results   Component Value Date    TRIG 114 10/13/2020    TRIG 255 07/25/2019    HDL 32 10/13/2020    HDL 40 07/25/2019    LDLCALC 84 10/13/2020    LDLCALC 106 07/25/2019       Cardiac Imaging:   CARDIAC CATH 10/14/20:    LEFT HEART CATH  LM: ostial pinch (MLA by IVUS was >30)  LAD: luminals, luminals calcification  Ramus: luminals  LCX: Luminals  RCA: dominant, sluggish flow. KEVIN-2 flow. LVEDP: 15  LVEF: <20%, severe global hypokinesis.                 No MR, No AS   RIGHT HEART CATH  RA: 6   RV: 33/6   PA: 33/15   and mean of 22  PCWP: 15   Sats: on RA  Ao: 89%  RA:  59%  PA: 61%   CO/CI: 4.5/2.2 (stephanie)  SVR/PVR: 1272/122   Assessment  1. Severe nonischemic cardiomyopathy. LVEF less than 20%                -We will start secondary work-up                - will consider LifeVest  2. No significant CAD. Luminal calcification  3.  Relatively normal mildly elevated intracardiac filling pressures. ECHO 10/13/20:   Summary   The left ventricular systolic function is severely reduced with an ejection fraction of 20 - 25 %. There is hypokinesis of the apex, apical lateral, apical septum, anterior, inferior, anteroseptum and apical anterior walls. Left ventricular cavity size is mildly dilated. Left ventricular diastolic filling pressure is elevated. Moderate mitral regurgitation. Mild Bi-atrial enlargement. The right ventricle is mildly enlarged. Right ventricular systolic function is mildly reduced . Mild tricuspid and pulmonic regurgitation. There is a left pleural effusion.

## 2020-10-16 NOTE — DISCHARGE SUMMARY
Hospital Medicine Discharge Summary    Patient ID: Aldo Whittington      Patient's PCP: Susan Kirkpatrick MD    Admit Date: 10/12/2020     Discharge Date: 10/16/2020      Admitting Physician: Radha Olvera MD     Discharge Physician: Annie Leo MD     Discharge Diagnoses: Active Hospital Problems    Diagnosis    NSVT (nonsustained ventricular tachycardia) (HCC) [I47.2]    Non-ischemic cardiomyopathy (HCC) [I42.8]    Transaminitis [R74.01]    Acute heart failure (HCC) [I50.9]    SOB (shortness of breath) [R06.02]    Left bundle branch block [I44.7]    Essential hypertension [I10]    Elevated LFTs [R79.89]       The patient was seen and examined on day of discharge and this discharge summary is in conjunction with any daily progress note from day of discharge. Hospital Course:   History Of Present Illness:   60 y.o. male with no significant past medical history , has not seen PCP in 2 years who presented to [facility] ED with c/o progressively worsening shortness of breath over the last 2 to 3 weeks associated with pedal edema. Patient reports that he has exertional shortness of breath and unable to walk without getting short of breath for more than 75 feet. Patient denies smoking, drinking, illicit drug use. Reports that his dad and paternal uncle passed with heart attack in their 62s. Denies any chest pain, palpitations. Has cough with brownish sputum production but denies any hemoptysis. Denies palpitations.     ED course : Patient noted to be tachycardic and hypertensive upon arrival to ED. Labs suggestive of WBC 11.7. BMP unremarkable. Elevated proBNP 10, 055 . Chest x-ray consistent with bilateral pleural effusions with pulmonary edema. Patient given 40 mg IV Lasix x 1 and cardiology consulted, admitted to hospital for further evaluation and management.     Upon evaluation by me, patient denies any new complaints.       1.  New onset acute CHF, unspecified type however nonischemic  -As evidenced by elevated proBNP 10, 055 and chest x-ray c/w pulmonary edema and significant bilateral pleural effusions  - telemetry monitored  -Received 40 mg x 1 IV Lasix in ED; continue 40 twice daily and monitor strict I's/O, daily weights  -CHF RN consulted  -Cardiology consulted from ED -follow recommendations  -Checked FLP ; started on low-dose lisinopril.  -bb started  -echo 10/13  noted new cardiomyopathy(20%, mod mr, diffuse hk noted, RV mildly enlarged, mild tr/pulm regurg, left pleural effusion)  -LHC/RHC  done 10/14, no signif cad/luminal calcification only, nonischemic CM noted(HIV neg, Hep panel neg), coxsackie virus pending  -lifevest was arranged    Physical Exam Performed:     /73   Pulse 98   Temp 98.8 °F (37.1 °C) (Oral)   Resp 18   Ht 5' 10\" (1.778 m)   Wt 195 lb (88.5 kg)   SpO2 95%   BMI 27.98 kg/m²       General appearance:  No apparent distress, appears stated age and cooperative. HEENT:  Normal cephalic, atraumatic without obvious deformity. Pupils equal, round, and reactive to light. Extra ocular muscles intact. Conjunctivae/corneas clear. Neck: Supple, with full range of motion. No jugular venous distention. Trachea midline. Respiratory:  Normal respiratory effort. Clear to auscultation, bilaterally without Rales/Wheezes/Rhonchi. Cardiovascular:  Regular rate and rhythm with normal S1/S2 without murmurs, rubs or gallops. Abdomen: Soft, non-tender, non-distended with normal bowel sounds. Musculoskeletal:  No clubbing, cyanosis or edema bilaterally. Full range of motion without deformity. Skin: Skin color, texture, turgor normal.  No rashes or lesions. Neurologic:  Neurovascularly intact without any focal sensory/motor deficits.  Cranial nerves: II-XII intact, grossly non-focal.  Psychiatric:  Alert and oriented, thought content appropriate, normal insight  Capillary Refill: Brisk,< 3 seconds   Peripheral Pulses: +2 palpable, equal bilaterally       Labs: For convenience and continuity at follow-up the following most recent labs are provided:      CBC:    Lab Results   Component Value Date    WBC 9.5 10/15/2020    HGB 14.4 10/15/2020    HCT 43.1 10/15/2020     10/15/2020       Renal:    Lab Results   Component Value Date     10/16/2020    K 4.0 10/16/2020    K 3.6 10/15/2020     10/16/2020    CO2 27 10/16/2020    BUN 20 10/16/2020    CREATININE 1.0 10/16/2020    CALCIUM 8.4 10/16/2020         Significant Diagnostic Studies    Radiology:   XR CHEST PORTABLE   Final Result   Perihilar and bibasilar airspace opacities with associated pleural effusions,   slightly worse on the right. Overall pattern suspected to represent   developing pulmonary edema. An underlying viral infection can not be   excluded. Multifocal pneumonia would be considered less likely. Consults:     IP CONSULT TO HOSPITALIST  IP CONSULT TO CARDIOLOGY  IP CONSULT TO HEART FAILURE NURSE/COORDINATOR  IP CONSULT TO DIETITIAN  IP CONSULT TO CARDIAC REHAB    Disposition:  home     Condition at Discharge: Stable    Discharge Instructions/Follow-up:  Cards as outpt in 2 weeks    Code Status:  Full Code     Activity: activity as tolerated    Diet: low sodium      Discharge Medications:     Discharge Medication List as of 10/16/2020  1:13 PM           Details   aspirin 81 MG chewable tablet Take 1 tablet by mouth daily, Disp-30 tablet,R-3Normal      lisinopril (PRINIVIL;ZESTRIL) 5 MG tablet Take 1 tablet by mouth 2 times daily, Disp-60 tablet,R-3Normal      metoprolol succinate (TOPROL XL) 25 MG extended release tablet Take 1 tablet by mouth daily, Disp-30 tablet,R-3Normal             Time Spent on discharge is more than 30 minutes in the examination, evaluation, counseling and review of medications and discharge plan. Signed:    Stephen Eid MD   10/16/2020      Thank you Nick Bermudez MD for the opportunity to be involved in this patient's care.  If you have

## 2020-10-16 NOTE — PLAN OF CARE
Problem: HEMODYNAMIC STATUS  Goal: Patient has stable vital signs and fluid balance  10/16/2020 0052 by Enoc Bateman RN  Outcome: Ongoing  Note:   Patient's EF (Ejection Fraction) is less than 40%    Heart Failure Medications:  Diuretics[de-identified] None    (One of the following REQUIRED for EF <40%/SYSTOLIC FAILURE but MAY be used in EF% >40%/DIASTOLIC FAILURE)        ACE[de-identified] Lisinopril        ARB[de-identified] None         ARNI[de-identified] None    (Beta Blockers)  NON- Evidenced Based Beta Blocker (for EF% >40%/DIASTOLIC FAILURE): None    Evidenced Based Beta Blocker::(REQUIRED for EF% <40%/SYSTOLIC FAILURE) None  . .................................................................................................................................................. Patient's weights and intake/output reviewed: Yes    Patient's Last Weight: 195 lbs obtained by standing scale. Difference of 2 lbs more than last documented weight. Intake/Output Summary (Last 24 hours) at 10/16/2020 0052  Last data filed at 10/15/2020 2059  Gross per 24 hour   Intake 970 ml   Output 520 ml   Net 450 ml       Comorbidities Reviewed Yes    Patient has no past medical history on file. >>For CHF and Comorbidity documentation on Education Time and Topics, please see Education Tab    Progressive Mobility Assessment:  What is this patient's Current Level of Mobility?: Ambulatory-Up Ad Jovita  How was this patient Mobilized today?: Edge of Bed, Up to Chair, Bedside Commode,  Up to Toilet/Shower, Up in Room, and Up in Hallway                 With Whom? Nurse and Self                 Level of Difficulty/Assistance: Independent     Pt resting in bed at this time on room air. Pt denies shortness of breath. Pt with nonpitting lower extremity edema.      Patient and/or Family's stated Goal of Care this Admission: reduce shortness of breath, increase activity tolerance, better understand heart failure and disease management, be more comfortable, and reduce lower extremity edema

## 2020-10-16 NOTE — PROGRESS NOTES
Pagekandi Willis MD: \"FYI pt and wife completed LifeVest education and pt has LifeVest on. \"    Dionna Gallegos MD working on pt's discharge at this time.

## 2020-10-16 NOTE — PROGRESS NOTES
[facility] notified writer that pt had 6 beats of Vtach. Writer entered room and pt stated he had no complaints.

## 2020-10-16 NOTE — PROGRESS NOTES
Pt gave this RN a number for peer to peer with LifeVest. Number given to Armando Ahmadi RN with Cardiology to give to [facility] MD. [phone].

## 2020-10-16 NOTE — TELEPHONE ENCOUNTER
Janelle called requesting dates and times for the [id number]. Please call [phone].  Reference # [id number]

## 2020-10-16 NOTE — PROGRESS NOTES
PIV removed. Tip intact. Dressing in place. Tele box removed. [facility] notified of pt discharge. Discharge paperwork went over with and given to pt. Verbalizes understanding. Pt lock box emptied. Pt discharge home in stable condition. Pt made aware of follow up appointment on October 28th at 1015. Pt picking up meds to beds from outpatient pharmacy. Pt ambulatory to private car with all belongings and LifeVest on.

## 2020-10-16 NOTE — PLAN OF CARE
Problem: OXYGENATION/RESPIRATORY FUNCTION  Goal: Patient will maintain patent airway  Outcome: Ongoing     Patient's EF (Ejection Fraction) is less than 40%    Heart Failure Medications:  Diuretics[de-identified] None    (One of the following REQUIRED for EF <40%/SYSTOLIC FAILURE but MAY be used in EF% >40%/DIASTOLIC FAILURE)        ACE[de-identified] Lisinopril        ARB[de-identified] None         ARNI[de-identified] None    (Beta Blockers)  NON- Evidenced Based Beta Blocker (for EF% >40%/DIASTOLIC FAILURE): None    Evidenced Based Beta Blocker::(REQUIRED for EF% <40%/SYSTOLIC FAILURE) Metoprolol SUCCinate- Toprol XL  . .................................................................................................................................................. Patient's weights and intake/output reviewed: Yes    Patient's Last Weight: 195 lbs obtained by standing scale. Difference of 0 lbs more than last documented weight. Intake/Output Summary (Last 24 hours) at 10/16/2020 1508  Last data filed at 10/16/2020 1325  Gross per 24 hour   Intake 840 ml   Output 150 ml   Net 690 ml       Comorbidities Reviewed Yes    Patient has no past medical history on file. >>For CHF and Comorbidity documentation on Education Time and Topics, please see Education Tab    Progressive Mobility Assessment:  What is this patient's Current Level of Mobility?: Ambulatory-Up Ad Jovita  How was this patient Mobilized today?: Edge of Bed, Up to Chair,  Up to Toilet/Shower, and Up in Room                 With Whom? Self                 Level of Difficulty/Assistance: Independent     Pt resting in bed at this time on room air. Pt denies shortness of breath. Pt without lower extremity edema.      Patient and/or Family's stated Goal of Care this Admission: increase activity tolerance, better understand heart failure and disease management, and be more comfortable prior to discharge        :

## 2020-10-16 NOTE — TELEPHONE ENCOUNTER
Peer review number received from wife.   Call placed and message left for urgent peer to peer for life vest.    #[phone]    LELAND Espinosa - CNP

## 2020-10-16 NOTE — DISCHARGE INSTR - COC
Continuity of Care Form    Patient Name: Johnathon Regalado   :  1965  MRN:  [id number]    Admit date:  10/12/2020  Discharge date:  ***    Code Status Order: Full Code   Advance Directives:   Advance Care Flowsheet Documentation     Date/Time Healthcare Directive Type of Healthcare Directive Copy in [address] Agent's Name Healthcare Agent's Phone Number    10/12/20 1045  No, patient does not have an advance directive for healthcare treatment -- -- -- -- --          Admitting Physician:  Libia Camara MD  PCP: Nick Bermudez MD    Discharging Nurse: [facility] Unit/Room#: 0202/0202-02  Discharging Unit Phone Number: ***    Emergency Contact:   Extended Emergency Contact Information  Primary Emergency Contact: Rashida Curran  Address: [address] Phone: [phone]  Mobile Phone: [phone]  Relation: Spouse    Past Surgical History:  History reviewed. No pertinent surgical history.     Immunization History:   Immunization History   Administered Date(s) Administered    Tdap (Boostrix, Adacel) 2015       Active Problems:  Patient Active Problem List   Diagnosis Code    Basal cell carcinoma of right preauricular C44.319    Acute heart failure (HCC) I50.9    SOB (shortness of breath) R06.02    Left bundle branch block I44.7    Essential hypertension I10    Elevated LFTs R79.89    Non-ischemic cardiomyopathy ([address] I42.8    Transaminitis R74.01       Isolation/Infection:   Isolation          No Isolation        Patient Infection Status     Infection Onset Added Last Indicated Last Indicated By Review Planned Expiration Resolved Resolved By    None active    Resolved    COVID-19 Rule Out 10/12/20 10/12/20 10/12/20 COVID-19 (Ordered)   10/12/20 Rule-Out Test Resulted          Nurse Assessment:  Last Vital Signs: /73   Pulse 98   Temp 98.8 °F (37.1 °C) (Oral)   Resp 18   Ht 5' 10\" (1.778 m)   Wt 195 lb (88.5 kg)   SpO2 95%   BMI 27.98 kg/m²     Last documented pain score (0-10 scale): Pain Level: 0  Last Weight:   Wt Readings from Last 1 Encounters:   10/16/20 195 lb (88.5 kg)     Mental Status:  {IP PT MENTAL STATUS:}    IV Access:  { ED IV ACCESS:522786618}    Nursing Mobility/ADLs:  Walking   {P DME IHH}  Transfer  {P DME ZXWH:437366037}  Bathing  {P DME KLZJ:323916911}  Dressing  {CHP DME WZPE:364799580}  Toileting  {P DME BOOX:242286757}  Feeding  {P DME CZRS:033119004}  Med Admin  {P DME YZZI:321486341}  Med Delivery   { ED MED Delivery:691543945}    Wound Care Documentation and Therapy:        Elimination:  Continence:   · Bowel: {YES / AV:51257}  · Bladder: {YES / :84777}  Urinary Catheter: {Urinary Catheter:766668257}   Colostomy/Ileostomy/Ileal Conduit: {YES / IC:24761}       Date of Last BM: ***    Intake/Output Summary (Last 24 hours) at 10/16/2020 1312  Last data filed at 10/16/2020 0950  Gross per 24 hour   Intake 960 ml   Output 400 ml   Net 560 ml     I/O last 3 completed shifts:   In: 1 [P.O.:960; I.V.:10]  Out: 520 [Urine:520]    Safety Concerns:     [address] [employer] Safety Concerns:453946593}    Impairments/Disabilities:      [address] [employer] Impairments/Disabilities:338986628}    Nutrition Therapy:  Current Nutrition Therapy:   { ED Diet List:897506004}    Routes of Feeding: {[facility] DME Other Feedings:804755005}  Liquids: {Slp liquid thickness:41351}  Daily Fluid Restriction: {P DME Yes amt example:300685470}  Last Modified Barium Swallow with Video (Video Swallowing Test): {Done Not Done FDQC:976320783}    Treatments at the Time of Hospital Discharge:   Respiratory Treatments: ***  Oxygen Therapy:  {Therapy; copd oxygen:06132}  Ventilator:    {RUDY LUNA Vent YCAW:123219287}    Rehab Therapies: {THERAPEUTIC INTERVENTION:3413887979}  Weight Bearing Status/Restrictions: {RUDY LUNA Weight Bearin}  Other Medical Equipment (for information only, NOT a

## 2020-10-17 LAB — ANGIOTENSIN CONVERTING ENZYME: <5 U/L (ref 9–67)

## 2020-10-19 ENCOUNTER — TELEPHONE (OUTPATIENT)
Dept: CARDIOLOGY CLINIC | Age: 55
End: 2020-10-19

## 2020-10-19 ENCOUNTER — TELEPHONE (OUTPATIENT)
Dept: INTERNAL MEDICINE CLINIC | Age: 55
End: 2020-10-19

## 2020-10-19 LAB
ARSENIC BLOOD: <10 UG/L
LEAD LEVEL BLOOD: <2 UG/DL
MERCURY BLOOD: <2.5 UG/L

## 2020-10-20 ENCOUNTER — TELEPHONE (OUTPATIENT)
Dept: CARDIOLOGY CLINIC | Age: 55
End: 2020-10-20

## 2020-10-20 RX ORDER — FUROSEMIDE 40 MG/1
40 TABLET ORAL DAILY
Qty: 30 TABLET | Refills: 5 | Status: SHIPPED | OUTPATIENT
Start: 2020-10-20 | End: 2021-06-01

## 2020-10-20 NOTE — TELEPHONE ENCOUNTER
----- Message from Stan Armendariz MD sent at 10/19/2020 12:50 PM EDT -----  Let patient know their heavy metal test is negative, continue current meds, no new orders or changes at this time. Thanks.

## 2020-10-20 NOTE — TELEPHONE ENCOUNTER
Created telephone encounter. Spoke with Marilin Richards relayed message per [facility] regarding labs. Pt verbalized understanding. Pt states he has noticed an increase in swelling of his ankles over the last 2 days, and an increase in sob as well. He says he weighed 198 lb today. No cp. Please advise.

## 2020-10-20 NOTE — TELEPHONE ENCOUNTER
Pt wife called on answering service pt has gained 2 lbs in a day and has slight swelling in ankles. Needs to know what to do?  Pls call to advise Thank you

## 2020-10-20 NOTE — TELEPHONE ENCOUNTER
Yvon Clemente with [facility] is requesting to speak with [employer] regarding pt's peer to peer. Case # [id number], ph: [phone].

## 2020-10-20 NOTE — TELEPHONE ENCOUNTER
Lets add Lasix 40 mg p.o. daily and get a follow-up BMP and BNP next week. Lets make sure he is not taking any other diuretics at present time. Thank you.

## 2020-10-20 NOTE — TELEPHONE ENCOUNTER
[facility] Initial Follow Up Call    Outreach made within 2 business days of discharge: Yes    Patient: Yanet Sandra Patient : 1965   MRN: [id number]  Reason for Admission: There are no discharge diagnoses documented for the most recent discharge. Discharge Date: 10/16/20       Spoke with: yecenia    Discharge department/facility: [facility]    [facility] Interactive Patient Contact:  Was patient able to fill all prescriptions: Yes  Was patient instructed to bring all medications to the follow-up visit: Yes  Is patient taking all medications as directed in the discharge summary?  Yes  Does patient understand their discharge instructions: Yes  Does patient have questions or concerns that need addressed prior to 7-14 day follow up office visit: no    Scheduled appointment with PCP within 7-14 days    Follow Up  Future Appointments   Date Time Provider Woodrow Tate   10/28/2020 10:15 AM Flash Orellana MD [address]

## 2020-10-20 NOTE — TELEPHONE ENCOUNTER
Last OV with NP DD on 10/16/20  NP DD requesting lifevest for patient. NP DD are you doing the peer to peer with Janelle?

## 2020-10-21 ENCOUNTER — TELEPHONE (OUTPATIENT)
Dept: CARDIOLOGY CLINIC | Age: 55
End: 2020-10-21

## 2020-10-21 LAB
COXSACKIE B1 ANTIBODY: NORMAL
COXSACKIE B2 ANTIBODY: NORMAL
COXSACKIE B3 ANTIBODY: NORMAL
COXSACKIE B4 ANTIBODY: NORMAL
COXSACKIE B5 ANTIBODY: NORMAL
COXSACKIE B6 ANTIBODY: NORMAL

## 2020-10-21 NOTE — LETTER
[address] Cardiology - [address]  Phone: [phone]  Fax: [phone]    Jermain Irene MD        October 27, 2020     Yesenia Omer  [address] [place] [address]      To Whom it May Concern:    Patient has been under Dr. Brittney Bermudez care from 10/12/20 until further notice. If you have any questions or concerns, please don't hesitate to call.     Sincerely,        Jermain Ireen MD

## 2020-10-21 NOTE — TELEPHONE ENCOUNTER
Please see separate telephone encounter. I called and left message with times available to do peer to peer.    Thank you ,Chris Matthews

## 2020-10-21 NOTE — TELEPHONE ENCOUNTER
Pt needs letter stating that he is under jp's care 10/12/20 until further notice.  Please fax to [address]
Yes

## 2020-10-23 ENCOUNTER — TELEPHONE (OUTPATIENT)
Dept: CARDIOLOGY CLINIC | Age: 55
End: 2020-10-23

## 2020-10-23 NOTE — TELEPHONE ENCOUNTER
----- Message from Daisy Bradford MD sent at 10/21/2020  6:14 PM EDT -----  Let patient know their coxsackie viral test is negative, continue current meds, no new orders or changes at this time. Thanks.

## 2020-10-28 ENCOUNTER — HOSPITAL ENCOUNTER (OUTPATIENT)
Age: 55
Discharge: HOME | End: 2020-10-28
Payer: COMMERCIAL

## 2020-10-28 ENCOUNTER — TELEPHONE (OUTPATIENT)
Dept: CARDIOLOGY CLINIC | Age: 55
End: 2020-10-28

## 2020-10-28 ENCOUNTER — OFFICE VISIT (OUTPATIENT)
Dept: CARDIOLOGY CLINIC | Age: 55
End: 2020-10-28
Payer: COMMERCIAL

## 2020-10-28 VITALS
SYSTOLIC BLOOD PRESSURE: 110 MMHG | DIASTOLIC BLOOD PRESSURE: 80 MMHG | HEART RATE: 103 BPM | WEIGHT: 194.5 LBS | BODY MASS INDEX: 27.84 KG/M2 | OXYGEN SATURATION: 97 % | HEIGHT: 70 IN

## 2020-10-28 LAB
ANION GAP SERPL CALCULATED.3IONS-SCNC: 10 MMOL/L (ref 3–16)
BUN BLDV-MCNC: 22 MG/DL (ref 7–20)
CALCIUM SERPL-MCNC: 9.3 MG/DL (ref 8.3–10.6)
CHLORIDE BLD-SCNC: 103 MMOL/L (ref 99–110)
CO2: 29 MMOL/L (ref 21–32)
CREAT SERPL-MCNC: 1.2 MG/DL (ref 0.9–1.3)
GFR AFRICAN AMERICAN: >60
GFR NON-AFRICAN AMERICAN: >60
GLUCOSE BLD-MCNC: 99 MG/DL (ref 70–99)
POTASSIUM SERPL-SCNC: 4.4 MMOL/L (ref 3.5–5.1)
PRO-BNP: 4685 PG/ML (ref 0–124)
SODIUM BLD-SCNC: 142 MMOL/L (ref 136–145)

## 2020-10-28 PROCEDURE — 99215 OFFICE O/P EST HI 40 MIN: CPT | Performed by: INTERNAL MEDICINE

## 2020-10-28 PROCEDURE — 36415 COLL VENOUS BLD VENIPUNCTURE: CPT

## 2020-10-28 PROCEDURE — 80048 BASIC METABOLIC PNL TOTAL CA: CPT

## 2020-10-28 PROCEDURE — 83880 ASSAY OF NATRIURETIC PEPTIDE: CPT

## 2020-10-28 RX ORDER — LISINOPRIL 10 MG/1
TABLET ORAL
Qty: 60 TABLET | Refills: 5 | Status: SHIPPED | OUTPATIENT
Start: 2020-10-28 | End: 2020-12-16 | Stop reason: ALTCHOICE

## 2020-10-28 NOTE — LETTER
[address] [facility]   Cardiovascular Evaluation    PATIENT: Sony Varma  DATE: 10/28/2020  MRN: [id number]  CSN: [id number]  : 1965      Primary Care Doctor: Jimmy Giraldo MD  Reason for evaluation:   Follow-Up from Hospital; Fatigue; and Other (Pt states overall he is feeling better and no new cardiac symptoms to report.)      Subjective:   History of present illness on initial date of evaluation: Sony Varma is a 60 y.o. patient who presents for hospital follow up. He presented to hospital on 10/12/20 with complaints of SOB and chest pain. Echo demonstrated EF 20-25%, mod biatrial enlargement, mild TR and PA. LHC/RHC 10/14/20 demonstrated severe nonischemic cardiomyopathy, no significant CAD. Today he reports feeling ok. He is fatigued, but over all better. He states after leaving the hospital he started having fluid build up and started taking Lasix. He is currently taking it once daily. He is wearing the life vest.  He is checking his BP at home and brings in a log. Systolic [id number]. Patient Active Problem List   Diagnosis    Basal cell carcinoma of right preauricular    Acute heart failure (HCC)    SOB (shortness of breath)    Left bundle branch block    Essential hypertension    Elevated LFTs    Non-ischemic cardiomyopathy ([address]    Transaminitis    NSVT (nonsustained ventricular tachycardia) ([address]         Past Medical History:   has no past medical history on file. Surgical History:   has no past surgical history on file. Social History:   reports that he has never smoked. He has never used smokeless tobacco. He reports current alcohol use. He reports that he does not use drugs. Family History:  No evidence for sudden cardiac death or premature CAD    Home Medications:  Reviewed and are listed in nursing record.  and/or listed below  Current Outpatient Medications   Medication Sig Dispense Refill  furosemide (LASIX) 40 MG tablet Take 1 tablet by mouth daily 30 tablet 5    aspirin 81 MG chewable tablet Take 1 tablet by mouth daily 30 tablet 3    lisinopril (PRINIVIL;ZESTRIL) 5 MG tablet Take 1 tablet by mouth 2 times daily 60 tablet 3    metoprolol succinate (TOPROL XL) 25 MG extended release tablet Take 1 tablet by mouth daily 30 tablet 3     No current facility-administered medications for this visit. Allergies:  Patient has no known allergies. Review of Systems:   A 14 point review of symptoms completed. Pertinent positives identified in the HPI, all other review of symptoms negative as below.     Objective:   PHYSICAL EXAM:    Vitals:    10/28/20 1020   BP: 110/80   Pulse: 103   SpO2: 97%    Weight: 194 lb 8 oz (88.2 kg)     Wt Readings from Last 3 Encounters:   10/28/20 194 lb 8 oz (88.2 kg)   10/16/20 195 lb (88.5 kg)   07/27/20 190 lb (86.2 kg)         General Appearance:  Alert, cooperative, no distress, appears stated age   Head:  Normocephalic, atraumatic   Eyes:  PERRL, conjunctiva/corneas clear   Nose: Nares normal, no drainage or sinus tenderness   Throat: Lips, mucosa, and tongue normal   Neck: Supple, symmetrical, trachea midline, NL thyroid no carotid bruit or JVD   Lungs:   CTAB, respirations unlabored   Chest Wall:  No tenderness or deformity   Heart:  Regular rhythm and normal rate; S1, S2 are normal;   no murmur noted; no rub or gallop   Abdomen:   Soft, non-tender, +BS x 4, no masses, no organomegaly   Extremities: Extremities normal, atraumatic, no cyanosis 1+ BLE pitting edema   Pulses: 2+ and symmetric   Skin: Skin color, texture, turgor normal, no rashes or lesions   Pysch: Normal mood and affect   Neurologic: Normal gross motor and sensory exam.         LABS   CBC:      Lab Results   Component Value Date    WBC 9.5 10/15/2020    RBC 5.00 10/15/2020    HGB 14.4 10/15/2020    HCT 43.1 10/15/2020    MCV 86.2 10/15/2020    RDW 13.7 10/15/2020     10/15/2020 LM: ostial pinch (MLA by IVUS was >30)  LAD: luminals, luminals calcification  Ramus: luminals  LCX: Luminals  RCA: dominant, sluggish flow. KEVIN-2 flow. LVEDP: 15  LVEF: <20%, severe global hypokinesis. No MR, No AS  RIGHT HEART CATH  RA: 6   RV: 33/6   PA: 33/15   and mean of  22  PCWP: 15     Sats: on RA  Ao: 89%  RA:  59%  PA: 61%     CO/CI: 4.5/2.2 (stephanie)  SVR/PVR: 1272/122  Assessment  1. Severe nonischemic cardiomyopathy. LVEF less than 20%                -We will start secondary work-up                - will consider LifeVest  2. No significant CAD. Luminal calcification  3.  Relatively normal mildly elevated intracardiac filling pressures. VASCULAR/OTHER IMAGING:      Assessment and Plan   Naldo Rivera is a 60 y.o. male who presents today for the following problems:      1. Chronic nonischemic dilated cardiomyopathy: EF less than 20%  2. Abnormal EKG: Left bundle branch block. 3.  Hypertension: controlled  4. Elevated LFTs  5. SOB: improved  6. NSVT: in house      MD Plan:  1. Today patient appears and feels better following hospital discharge. 2.  We did have to start Lasix as an outpatient due to shortness of breath and edema and this is improved. -Still has some edema so continue Lasix 40 mg daily for now   -BNP, BMP is ordered and will get done today new Lasix start  3. Home weight this morning was 190.8 pounds for comparison  4. Continue Toprol 25 mg daily  5. Slightly increase lisinopril to 10 mg in the a.m. 5 mg in p.m. as tolerated  6. All blood tests for secondary work-up were negative at this point will get cardiac MRI to evaluate for any infiltrative, edema, or fibrosis. Continue LifeVest, limited echocardiogram in 90 days7. -ICD would be indicated if EF less than 35%   - will call life vest to interogate, ?  Further VT         Patient Active Problem List   Diagnosis    Basal cell carcinoma of right preauricular    Acute heart failure ([facility] [address]

## 2020-10-28 NOTE — PATIENT INSTRUCTIONS
Patient Plan:  1. Increase the lisinopril 10 mg in the AM and 5 mg in PM    ~no change in other medications   2. Limited Echocardiogram to view size and strength of the heart January 13, 2021 or close to that date  3. Cardiac MRI (to be done at [facility])  3. Follow up in 1 month     Your provider has ordered testing for further evaluation. An order/prescription has been included in your paper work.  To schedule outpatient testing, contact Central Scheduling by calling [address] [place] [address] ([phone]).

## 2020-10-28 NOTE — PROGRESS NOTES
[address] [facility]   Cardiovascular Evaluation    PATIENT: Adalberto Beth  DATE: 10/28/2020  MRN: [id number]  CSN: [id number]  : 1965      Primary Care Doctor: Taylor Rodriguez MD  Reason for evaluation:   Follow-Up from Hospital; Fatigue; and Other (Pt states overall he is feeling better and no new cardiac symptoms to report.)      Subjective:   History of present illness on initial date of evaluation: Adalberto Beth is a 60 y.o. patient who presents for hospital follow up. He presented to hospital on 10/12/20 with complaints of SOB and chest pain. Echo demonstrated EF 20-25%, mod biatrial enlargement, mild TR and OR. LHC/RHC 10/14/20 demonstrated severe nonischemic cardiomyopathy, no significant CAD. Today he reports feeling ok. He is fatigued, but over all better. He states after leaving the hospital he started having fluid build up and started taking Lasix. He is currently taking it once daily. He is wearing the life vest.  He is checking his BP at home and brings in a log. Systolic [id number]. Patient Active Problem List   Diagnosis    Basal cell carcinoma of right preauricular    Acute heart failure (HCC)    SOB (shortness of breath)    Left bundle branch block    Essential hypertension    Elevated LFTs    Non-ischemic cardiomyopathy ([address]    Transaminitis    NSVT (nonsustained ventricular tachycardia) ([address]         Past Medical History:   has no past medical history on file. Surgical History:   has no past surgical history on file. Social History:   reports that he has never smoked. He has never used smokeless tobacco. He reports current alcohol use. He reports that he does not use drugs. Family History:  No evidence for sudden cardiac death or premature CAD    Home Medications:  Reviewed and are listed in nursing record.  and/or listed below  Current Outpatient Medications   Medication Sig Dispense Refill    furosemide (LASIX) 40 MG tablet Take 1 tablet by 10/16/2020    K 4.0 10/16/2020    K 3.6 10/15/2020     10/16/2020    CO2 27 10/16/2020    BUN 20 10/16/2020    CREATININE 1.0 10/16/2020    GFRAA >60 10/16/2020    AGRATIO 1.6 10/12/2020    LABGLOM >60 10/16/2020    GLUCOSE 115 10/16/2020    PROT 5.3 10/14/2020    CALCIUM 8.4 10/16/2020    BILITOT 1.1 10/12/2020    ALKPHOS 63 10/12/2020    AST 89 10/12/2020     10/12/2020     PT/INR:   No results found for: PTINR  Liver:  No components found for: CHLPL  Lab Results   Component Value Date     (H) 10/12/2020    AST 89 (H) 10/12/2020    ALKPHOS 63 10/12/2020    BILITOT 1.1 (H) 10/12/2020     Lab Results   Component Value Date    LABA1C 5.8 07/25/2019     Lipids:         Lab Results   Component Value Date    TRIG 114 10/13/2020    TRIG 255 (H) 07/25/2019    TRIG 243 (H) 12/23/2015            Lab Results   Component Value Date    HDL 32 (L) 10/13/2020    HDL 40 07/25/2019    HDL 37 (L) 12/23/2015            Lab Results   Component Value Date    LDLCALC 84 10/13/2020    LDLCALC 106 (H) 07/25/2019    LDLCALC 119 (H) 12/23/2015            Lab Results   Component Value Date    LABVLDL 23 10/13/2020    LABVLDL 51 07/25/2019    LABVLDL 49 12/23/2015         CARDIAC DATA   EKG:    ECHO/MUGA: 10/13/20  Summary   The left ventricular systolic function is severely reduced with an ejection   fraction of 20 - 25 %. There is hypokinesis of the apex, apical lateral, apical septum, anterior,   inferior, anteroseptum and apical anterior walls. Left ventricular cavity size is mildly dilated. Left ventricular diastolic filling pressure is elevated. Moderate mitral regurgitation. Mild Bi-atrial enlargement. The right ventricle is mildly enlarged. Right ventricular systolic function is mildly reduced . Mild tricuspid and pulmonic regurgitation. There is a left pleural effusion.       STRESS TEST:    CARDIAC CATH: 10/14/20  LEFT HEART CATH  LM: ostial pinch (MLA by IVUS was >30)  LAD: luminals, luminals calcification  Ramus: luminals  LCX: Luminals  RCA: dominant, sluggish flow. KEVIN-2 flow. LVEDP: 15  LVEF: <20%, severe global hypokinesis. No MR, No AS  RIGHT HEART CATH  RA: 6   RV: 33/6   PA: 33/15   and mean of  22  PCWP: 15     Sats: on RA  Ao: 89%  RA:  59%  PA: 61%     CO/CI: 4.5/2.2 (stephanie)  SVR/PVR: 1272/122  Assessment  1. Severe nonischemic cardiomyopathy. LVEF less than 20%                -We will start secondary work-up                - will consider LifeVest  2. No significant CAD. Luminal calcification  3.  Relatively normal mildly elevated intracardiac filling pressures. VASCULAR/OTHER IMAGING:      Assessment and Plan   Chichi Boateng is a 60 y.o. male who presents today for the following problems:      1. Chronic nonischemic dilated cardiomyopathy: EF less than 20%  2. Abnormal EKG: Left bundle branch block. 3.  Hypertension: controlled  4. Elevated LFTs  5. SOB: improved  6. NSVT: in house      MD Plan:  1. Today patient appears and feels better following hospital discharge. 2.  We did have to start Lasix as an outpatient due to shortness of breath and edema and this is improved. -Still has some edema so continue Lasix 40 mg daily for now   -BNP, BMP is ordered and will get done today new Lasix start  3. Home weight this morning was 190.8 pounds for comparison  4. Continue Toprol 25 mg daily  5. Slightly increase lisinopril to 10 mg in the a.m. 5 mg in p.m. as tolerated  6. All blood tests for secondary work-up were negative at this point will get cardiac MRI to evaluate for any infiltrative, edema, or fibrosis. Continue LifeVest, limited echocardiogram in 90 days7. -ICD would be indicated if EF less than 35%   - will call life vest to interogate, ?  Further VT         Patient Active Problem List   Diagnosis    Basal cell carcinoma of right preauricular    Acute heart failure (HCC)    SOB (shortness of breath)    Left bundle branch block    Essential hypertension    Elevated LFTs    Non-ischemic cardiomyopathy (HCC)    Transaminitis    NSVT (nonsustained ventricular tachycardia) ([facility] [address]       Patient Plan:  1. Increase the lisinopril 10 mg in the AM and 5 mg in PM    ~no change in other medications   2. Limited Echocardiogram to view size and strength of the heart January 13, 2021 or close to that date  3. Cardiac MRI (to be done at [facility])  3. Follow up in 1 month       It is a pleasure to assist in the care of [address] [place]. Please call with any questions. This note was scribed in the presence of Anatoliy Colón MD by West Kapoor RN.      Christiano Mulligan MD, personally performed the services described in this documentation as scribed by the above signed scribe in my presence, and it is both accurate and complete to the best of our ability and knowledge. I agree with the details independently gathered by my clinical support staff, while the remaining scribed note accurately describes my personal service to the patient. The above RN is working as a scribe for and in the presence of myself . Working as a scribe, the RN may have prepopulated components of this note with my historical intellectual property under my direct supervision. Any additions to this intellectual property were performed at my direction. Furthermore, the content and accuracy of this note have been reviewed by me to the best of my ability.              Vijay Diallo MD, [address] [facility] Cardiologist  [facility]  [phone] [facility]  [phone] [address] [place] [address]

## 2020-10-28 NOTE — TELEPHONE ENCOUNTER
Pharmacy needs clarification on direction of lisinopril (PRINIVIL;ZESTRIL)     JOSE GARCIA [address] [facility]  [facility] [address]

## 2020-10-29 ENCOUNTER — TELEPHONE (OUTPATIENT)
Dept: CARDIOLOGY CLINIC | Age: 55
End: 2020-10-29

## 2020-10-29 NOTE — TELEPHONE ENCOUNTER
----- Message from Lawrence Hill MD sent at 10/29/2020 11:08 AM EDT -----  Please let patient know that BNP is elevated suggesting some continued cardiac fluid. He should continue taking Lasix daily I would have him take an additional dose today and tomorrow only. No need for supplemental potassium.   Follow-up as previously arranged

## 2020-10-30 NOTE — TELEPHONE ENCOUNTER
Yes.  Clarification:  He has not taken the supplement or gone to the gym since the episode. He was just trying to attribute something to the episode.

## 2020-10-30 NOTE — TELEPHONE ENCOUNTER
Spoke with wife. Message relayed and voiced understanding. He will take the extra dose today and tomorrow. He and his wife did want to mention that prior to his episode and swelling he was taking \"extend muscle recovery\" and \"ultimate muscle\" prior to going to the gym. States both had sodium  (220 mg and 240 mg). Just trying to figure out if this could have attributed to the episode.

## 2020-11-05 ENCOUNTER — TELEPHONE (OUTPATIENT)
Dept: CARDIOLOGY CLINIC | Age: 55
End: 2020-11-05

## 2020-11-05 NOTE — TELEPHONE ENCOUNTER
Patient's wife called stating Janelle still needs a peer to peer for the life vest. Patient's wife is asking to speak with npdd

## 2020-11-06 NOTE — TELEPHONE ENCOUNTER
I had called on 10/20/20 for peer to peer and given times available but never received a call back. Can you call and schedule the peer to peer with Dr. Anjana Messina or myself? Unfortunately I have a full office today and am out on Monday and Tuesday (11/9 and 11/10). Please schedule for anytime on Wednesday, Thursday or Friday next week. Thank you, Nomi Bynum    (Please see encounter form 10/19/20 for phone number and case number).

## 2020-11-09 NOTE — TELEPHONE ENCOUNTER
Peer to Peer is set up someone from [facility] will call toward the end of the week as per [facility] request.  When they call please transfer to NP.

## 2020-11-09 NOTE — TELEPHONE ENCOUNTER
[facility] requesting a peer to peer. When insurance calls back please schedule per [employer] request. Thanks.

## 2020-11-11 NOTE — TELEPHONE ENCOUNTER
Conducted peer to peer with Dr. Carissa Kay for [address] [employer].  He has all pertinent information. Janelle's coverage policy does not cover wearable defibrillators if patient does not meet criteria for implantable defibrillator. The patient does not yet qualify for defibrillator by guidelines, and then will not cover this. Guidelines are that implantable defibrillator is indicated for ejection fraction less than 35% 90 days after optimal medical therapy. He recommended that an appeal could be initiated by the patient which will go to an outside consultant (cardiologist). If this outside consultant agrees wearable defibrillator is indeed indicated then they may cover the cost of the defibrillator. Attempted to reach patient and wife on home phone number and cell number multiple times today. We will continue to have try tomorrow.   Beckie Points, APRN - CNP

## 2020-11-20 ENCOUNTER — OFFICE VISIT (OUTPATIENT)
Dept: CARDIOLOGY CLINIC | Age: 55
End: 2020-11-20
Payer: COMMERCIAL

## 2020-11-20 VITALS
SYSTOLIC BLOOD PRESSURE: 100 MMHG | DIASTOLIC BLOOD PRESSURE: 70 MMHG | WEIGHT: 194.5 LBS | BODY MASS INDEX: 27.84 KG/M2 | HEART RATE: 96 BPM | HEIGHT: 70 IN | OXYGEN SATURATION: 98 %

## 2020-11-20 PROCEDURE — 99214 OFFICE O/P EST MOD 30 MIN: CPT | Performed by: INTERNAL MEDICINE

## 2020-11-20 NOTE — PATIENT INSTRUCTIONS
Patient Plan:  1. Continue current medication regimen   2. Limited Echocardiogram to view size and strength of the heart January 13, 2021 or close to that date - Of Note:  This has been scheduled   3.  Cardiac MRI (to be done at [facility]) - will refax order  - Dr. Corky Chung to read

## 2020-11-20 NOTE — PROGRESS NOTES
[address] [facility]   Cardiovascular Evaluation    PATIENT: Wang Monet  DATE: 2020  MRN: [id number]  CSN: [id number]  : 1965      Primary Care Doctor: Rosemary Chacon MD  Reason for evaluation:   Follow-up (4-5 week follow up. No new cardiac symptoms or concerns to report at this time)      Subjective:   History of present illness on initial date of evaluation: Wang Monet is a 60 y.o. patient who presents for hospital follow up. He presented to hospital on 10/12/20 with complaints of SOB and chest pain. Echo demonstrated EF 20-25%, mod biatrial enlargement, mild TR and MO. LHC/RHC 10/14/20 demonstrated severe nonischemic cardiomyopathy, no significant CAD. Today he reports feeling ok. He is fatigued, but over all better. He states after leaving the hospital he started having fluid build up and started taking Lasix. He is currently taking it once daily. He is wearing the life vest.  He is checking his BP at home and brings in a log. Systolic [id number]. Today he reports feeling ok. He continues wearing the life vest.  He is taking his medications as prescribed. He states he has noticed some BLE edema. Patient Active Problem List   Diagnosis    Basal cell carcinoma of right preauricular    Acute heart failure (HCC)    SOB (shortness of breath)    Left bundle branch block    Essential hypertension    Elevated LFTs    Non-ischemic cardiomyopathy ([address]    Transaminitis    NSVT (nonsustained ventricular tachycardia) ([address]         Past Medical History:   has no past medical history on file. Surgical History:   has no past surgical history on file. Social History:   reports that he has never smoked. He has never used smokeless tobacco. He reports current alcohol use. He reports that he does not use drugs. Family History:  No evidence for sudden cardiac death or premature CAD    Home Medications:  Reviewed and are listed in nursing record.  and/or listed below  Current Outpatient Medications   Medication Sig Dispense Refill    lisinopril (PRINIVIL;ZESTRIL) 10 MG tablet Take 10 mg in the AM and 5 mg in PM 60 tablet 5    furosemide (LASIX) 40 MG tablet Take 1 tablet by mouth daily 30 tablet 5    aspirin 81 MG chewable tablet Take 1 tablet by mouth daily 30 tablet 3    metoprolol succinate (TOPROL XL) 25 MG extended release tablet Take 1 tablet by mouth daily 30 tablet 3     No current facility-administered medications for this visit. Allergies:  Patient has no known allergies. Review of Systems:   A 14 point review of symptoms completed. Pertinent positives identified in the HPI, all other review of symptoms negative as below.     Objective:   PHYSICAL EXAM:    Vitals:    11/20/20 1559   BP: 100/70   Pulse: 96   SpO2: 98%    Weight: 194 lb 8 oz (88.2 kg)     Wt Readings from Last 3 Encounters:   11/20/20 194 lb 8 oz (88.2 kg)   10/28/20 194 lb 8 oz (88.2 kg)   10/16/20 195 lb (88.5 kg)         General Appearance:  Alert, cooperative, no distress, appears stated age   Head:  Normocephalic, atraumatic   Eyes:  PERRL, conjunctiva/corneas clear   Nose: Nares normal, no drainage or sinus tenderness   Throat: Lips, mucosa, and tongue normal   Neck: Supple, symmetrical, trachea midline, NL thyroid no carotid bruit or JVD   Lungs:   CTAB, respirations unlabored   Chest Wall:  No tenderness or deformity   Heart:  Regular rhythm and normal rate; S1, S2 are normal;   no murmur noted; no rub or gallop   Abdomen:   Soft, non-tender, +BS x 4, no masses, no organomegaly   Extremities: Extremities normal, atraumatic, no cyanosis 1+ BLE pitting edema   Pulses: 2+ and symmetric   Skin: Skin color, texture, turgor normal, no rashes or lesions   Pysch: Normal mood and affect   Neurologic: Normal gross motor and sensory exam.         LABS   CBC:      Lab Results   Component Value Date    WBC 9.5 10/15/2020    RBC 5.00 10/15/2020    HGB 14.4 10/15/2020    HCT 43.1 10/15/2020 effusion. STRESS TEST:    CARDIAC CATH: 10/14/20  LEFT HEART CATH  LM: ostial pinch (MLA by IVUS was >30)  LAD: luminals, luminals calcification  Ramus: luminals  LCX: Luminals  RCA: dominant, sluggish flow. KEVIN-2 flow. LVEDP: 15  LVEF: <20%, severe global hypokinesis. No MR, No AS  RIGHT HEART CATH  RA: 6   RV: 33/6   PA: 33/15   and mean of  22  PCWP: 15     Sats: on RA  Ao: 89%  RA:  59%  PA: 61%     CO/CI: 4.5/2.2 (stephanie)  SVR/PVR: 1272/122  Assessment  1. Severe nonischemic cardiomyopathy. LVEF less than 20%                -We will start secondary work-up                - will consider LifeVest  2. No significant CAD. Luminal calcification  3.  Relatively normal mildly elevated intracardiac filling pressures. VASCULAR/OTHER IMAGING:      Assessment and Plan   Hi Vance is a 60 y.o. male who presents today for the following problems:      1. Chronic nonischemic dilated cardiomyopathy: EF less than 20%   - LVIDd 6cm  2. Abnormal EKG: Left bundle branch block. 3.  Hypertension: controlled  4. Elevated LFTs  5. SOB: improved  6. NSVT: in house   - no NSVT on Lifevest      MD Plan:  1. He is feeling better now with daily Lasix weight remains the same   -Home scale between 189 192 pound  2. He continues to be at high risk for sudden cardiac death so continue LifeVest for now   -ZOLL interrogation shows tachycardia mild to the 1 teens but no VT  3. Echocardiogram 90 days from original if EF remains less than 35% on after medical therapy will need implantable ICD  4. Continue daily Lasix for now last renal without hypokalemia  5. Family is to call for an appeal for LifeVest.  Strongly agree with this  6. Continue Toprol 25 mg daily  7.  Doing well on the increased lisinopril 10 mg a.m., 5 mg p.m.  6.  All blood tests for secondary work-up were negative at this point will get cardiac MRI to evaluate for any infiltrative, edema, or fibrosis.   - did not call the patient so we will resend the referral.         Patient Active Problem List   Diagnosis    Basal cell carcinoma of right preauricular    Acute heart failure (HCC)    SOB (shortness of breath)    Left bundle branch block    Essential hypertension    Elevated LFTs    Non-ischemic cardiomyopathy [facility]    Transaminitis    NSVT (nonsustained ventricular tachycardia) ([facility]       Patient Plan:  1. Continue current medication regimen   2. Limited Echocardiogram to view size and strength of the heart January 13, 2021 or close to that date - Of Note:  This has been scheduled   3. Cardiac MRI (to be done at [facility]) - will refax order  - Dr. Love Monsalve to read         It is a pleasure to assist in the care of [address] [place]. Please call with any questions. This note was scribed in the presence of Daniella Jimenez MD by Shayy Gutierrez RN.      Chichi Stanton MD, personally performed the services described in this documentation as scribed by the above signed scribe in my presence, and it is both accurate and complete to the best of our ability and knowledge. I agree with the details independently gathered by my clinical support staff, while the remaining scribed note accurately describes my personal service to the patient. The above RN is working as a scribe for and in the presence of myself . Working as a scribe, the RN may have prepopulated components of this note with my historical intellectual property under my direct supervision. Any additions to this intellectual property were performed at my direction. Furthermore, the content and accuracy of this note have been reviewed by me to the best of my ability.          Licha Arreguin MD, [address] [facility] Cardiologist  [place]  [phone] [facility]  [phone] [address] [place] [address]

## 2020-11-20 NOTE — LETTER
[address] [facility]   Cardiovascular Evaluation    PATIENT: Richa Krishnan  DATE: 2020  MRN: [id number]  CSN: [id number]  : 1965      Primary Care Doctor: Tg Varma MD  Reason for evaluation:   Follow-up (4-5 week follow up. No new cardiac symptoms or concerns to report at this time)      Subjective:   History of present illness on initial date of evaluation: Richa Krishnan is a 60 y.o. patient who presents for hospital follow up. He presented to hospital on 10/12/20 with complaints of SOB and chest pain. Echo demonstrated EF 20-25%, mod biatrial enlargement, mild TR and WA. LHC/RHC 10/14/20 demonstrated severe nonischemic cardiomyopathy, no significant CAD. Today he reports feeling ok. He is fatigued, but over all better. He states after leaving the hospital he started having fluid build up and started taking Lasix. He is currently taking it once daily. He is wearing the life vest.  He is checking his BP at home and brings in a log. Systolic [id number]. Today he reports feeling ok. He continues wearing the life vest.  He is taking his medications as prescribed. He states he has noticed some BLE edema. Patient Active Problem List   Diagnosis    Basal cell carcinoma of right preauricular    Acute heart failure (HCC)    SOB (shortness of breath)    Left bundle branch block    Essential hypertension    Elevated LFTs    Non-ischemic cardiomyopathy ([address]    Transaminitis    NSVT (nonsustained ventricular tachycardia) ([address]         Past Medical History:   has no past medical history on file. Surgical History:   has no past surgical history on file. Social History:   reports that he has never smoked. He has never used smokeless tobacco. He reports current alcohol use. He reports that he does not use drugs.      Family History:  No evidence for sudden cardiac death or premature CAD    Home Medications: RBC 5.00 10/15/2020    HGB 14.4 10/15/2020    HCT 43.1 10/15/2020    MCV 86.2 10/15/2020    RDW 13.7 10/15/2020     10/15/2020     CMP:  Lab Results   Component Value Date     10/28/2020    K 4.4 10/28/2020    K 3.6 10/15/2020     10/28/2020    CO2 29 10/28/2020    BUN 22 10/28/2020    CREATININE 1.2 10/28/2020    GFRAA >60 10/28/2020    AGRATIO 1.6 10/12/2020    LABGLOM >60 10/28/2020    GLUCOSE 99 10/28/2020    PROT 5.3 10/14/2020    CALCIUM 9.3 10/28/2020    BILITOT 1.1 10/12/2020    ALKPHOS 63 10/12/2020    AST 89 10/12/2020     10/12/2020     PT/INR:   No results found for: PTINR  Liver:  No components found for: CHLPL  Lab Results   Component Value Date     (H) 10/12/2020    AST 89 (H) 10/12/2020    ALKPHOS 63 10/12/2020    BILITOT 1.1 (H) 10/12/2020     Lab Results   Component Value Date    LABA1C 5.8 07/25/2019     Lipids:         Lab Results   Component Value Date    TRIG 114 10/13/2020    TRIG 255 (H) 07/25/2019    TRIG 243 (H) 12/23/2015            Lab Results   Component Value Date    HDL 32 (L) 10/13/2020    HDL 40 07/25/2019    HDL 37 (L) 12/23/2015            Lab Results   Component Value Date    LDLCALC 84 10/13/2020    LDLCALC 106 (H) 07/25/2019    LDLCALC 119 (H) 12/23/2015            Lab Results   Component Value Date    LABVLDL 23 10/13/2020    LABVLDL 51 07/25/2019    LABVLDL 49 12/23/2015         CARDIAC DATA   EKG:    ECHO/MUGA: 10/13/20  Summary   The left ventricular systolic function is severely reduced with an ejection   fraction of 20 - 25 %. There is hypokinesis of the apex, apical lateral, apical septum, anterior,   inferior, anteroseptum and apical anterior walls. Left ventricular cavity size is mildly dilated. Left ventricular diastolic filling pressure is elevated. Moderate mitral regurgitation. Mild Bi-atrial enlargement. The right ventricle is mildly enlarged. Right ventricular systolic function is mildly reduced . Mild tricuspid and pulmonic regurgitation. There is a left pleural effusion. STRESS TEST:    CARDIAC CATH: 10/14/20  LEFT HEART CATH  LM: ostial pinch (MLA by IVUS was >30)  LAD: luminals, luminals calcification  Ramus: luminals  LCX: Luminals  RCA: dominant, sluggish flow. KEVIN-2 flow. LVEDP: 15  LVEF: <20%, severe global hypokinesis. No MR, No AS  RIGHT HEART CATH  RA: 6   RV: 33/6   PA: 33/15   and mean of  22  PCWP: 15     Sats: on RA  Ao: 89%  RA:  59%  PA: 61%     CO/CI: 4.5/2.2 (stephanie)  SVR/PVR: 1272/122  Assessment  1. Severe nonischemic cardiomyopathy. LVEF less than 20%                -We will start secondary work-up                - will consider LifeVest  2. No significant CAD. Luminal calcification  3.  Relatively normal mildly elevated intracardiac filling pressures. VASCULAR/OTHER IMAGING:      Assessment and Plan   Yariel Lemus is a 60 y.o. male who presents today for the following problems:      1. Chronic nonischemic dilated cardiomyopathy: EF less than 20%   - LVIDd 6cm  2. Abnormal EKG: Left bundle branch block. 3.  Hypertension: controlled  4. Elevated LFTs  5. SOB: improved  6. NSVT: in house   - no NSVT on Lifevest      MD Plan:  1. He is feeling better now with daily Lasix weight remains the same   -Home scale between 189 192 pound  2. He continues to be at high risk for sudden cardiac death so continue LifeVest for now   -ZOLL interrogation shows tachycardia mild to the 1 teens but no VT  3. Echocardiogram 90 days from original if EF remains less than 35% on after medical therapy will need implantable ICD  4. Continue daily Lasix for now last renal without hypokalemia  5. Family is to call for an appeal for LifeVest.  Strongly agree with this  6. Continue Toprol 25 mg daily  7.  Doing well on the increased lisinopril 10 mg a.m., 5 mg p.m.  6.  All blood tests for secondary work-up were negative at this point will get cardiac MRI to evaluate for any infiltrative, edema, or fibrosis.   - did not call the patient so we will resend the referral.         Patient Active Problem List   Diagnosis    Basal cell carcinoma of right preauricular    Acute heart failure (HCC)    SOB (shortness of breath)    Left bundle branch block    Essential hypertension    Elevated LFTs    Non-ischemic cardiomyopathy ([facility]    Transaminitis    NSVT (nonsustained ventricular tachycardia) ([facility]       Patient Plan:  1. Continue current medication regimen   2. Limited Echocardiogram to view size and strength of the heart January 13, 2021 or close to that date - Of Note:  This has been scheduled   3. Cardiac MRI (to be done at [facility]) - will refax order  - Dr. Cadence Ha to read         It is a pleasure to assist in the care of [address] [place]. Please call with any questions. This note was scribed in the presence of Tomas Kirkland MD by Kari Irene RN.      Ailyn Talley MD, personally performed the services described in this documentation as scribed by the above signed scribe in my presence, and it is both accurate and complete to the best of our ability and knowledge. I agree with the details independently gathered by my clinical support staff, while the remaining scribed note accurately describes my personal service to the patient. The above RN is working as a scribe for and in the presence of myself . Working as a scribe, the RN may have prepopulated components of this note with my historical intellectual property under my direct supervision. Any additions to this intellectual property were performed at my direction. Furthermore, the content and accuracy of this note have been reviewed by me to the best of my ability.          Tamie Avila MD, [address] [facility] Cardiologist  [place]  [phone] [facility]  [phone] [address] [place] [address]

## 2020-11-24 ENCOUNTER — TELEPHONE (OUTPATIENT)
Dept: CARDIOLOGY CLINIC | Age: 55
End: 2020-11-24

## 2020-11-24 NOTE — TELEPHONE ENCOUNTER
Pt wife calling. Pt needs a copy of the office visit notes that states why JJP prescribed the Life Vest. The pt has to do an appeal to [employer] to get them to pay for it.      Please fax to [phone]

## 2020-12-07 ENCOUNTER — TELEPHONE (OUTPATIENT)
Dept: CARDIOLOGY CLINIC | Age: 55
End: 2020-12-07

## 2020-12-07 NOTE — TELEPHONE ENCOUNTER
----- Message from Daniel Olvera MD sent at 12/7/2020  9:40 AM EST -----  I called and spoke to patient and his wife. Patient MRI is very concerning for severe cardiac disease with inflammatory and fibrotic changes. Please schedule cardiac PET at [facility] as soon as possible. Patient will follow up in office with me in January for follow-up. I did discuss with him that we might need to consider cardiac transplantation evaluation and offered him  versus [place]'s versus [place].   I will also send this to EP to evaluate for ICD placement as patient is still wearing LifeVest    I will carbon copy the patient's PCP Dr.'s Haley Penny and EP as well

## 2020-12-07 NOTE — TELEPHONE ENCOUNTER
----- Message from Kimberley Alex MD sent at 12/7/2020  9:40 AM EST -----  I called and spoke to patient and his wife. Patient MRI is very concerning for severe cardiac disease with inflammatory and fibrotic changes. Please schedule cardiac PET at [facility] as soon as possible. Patient will follow up in office with me in January for follow-up. I did discuss with him that we might need to consider cardiac transplantation evaluation and offered him  versus [place]'s versus [place].   I will also send this to EP to evaluate for ICD placement as patient is still wearing LifeVest    I will carbon copy the patient's PCP Dr.'s Fairy Goodpasture and EP as well

## 2020-12-07 NOTE — TELEPHONE ENCOUNTER
----- Message from Angelica Mukherjee MD sent at 12/7/2020  9:40 AM EST -----  I called and spoke to patient and his wife. Patient MRI is very concerning for severe cardiac disease with inflammatory and fibrotic changes. Please schedule cardiac PET at [address] as soon as possible. Patient will follow up in office with me in January for follow-up. I did discuss with him that we might need to consider cardiac transplantation evaluation and offered him  versus [place] versus [place].   I will also send this to EP to evaluate for ICD placement as patient is still wearing LifeVest    I will carbon copy the patient's PCP 's Arthur Marcum and EP as well

## 2020-12-08 ENCOUNTER — TELEPHONE (OUTPATIENT)
Dept: CARDIOLOGY CLINIC | Age: 55
End: 2020-12-08

## 2020-12-08 NOTE — PROGRESS NOTES
ABHINAV called needing clarification on PET scan ordered by Kim Haro. Verified that the test was to r/o sarcoidosis. New order placed and faxed to [phone]    MRI:   IMPRESSION: Findings are most consistent with non-ischemic cardiomyopathy concerning for inflammatory or infiltrative disease with biventricular involvement. Differential includes subacute myocarditis or an infiltrative process such as cardiac sarcoidosis. Recommend FDG PET for further evaluation for sarcoidosis. There is four chamber dilation with severely reduced biventricular systolic function. There is evidence of decompensated heart failure.

## 2020-12-08 NOTE — TELEPHONE ENCOUNTER
Wife sts pt needs a letter faxed to his employer stating pt off work till reevaluation  Jan 13.  Fax to [phone]

## 2020-12-08 NOTE — LETTER
[address] Cardiology - [address]  Phone: [phone]  Fax: [phone]    Daniella Santacruz MD        December 9, 2020     [place] [id number] [address]       To Whom it May Concern:    Patient is to remain off work due to cardiac issues until her re-evaluation on 01/13/2021. If you have any questions or concerns, please don't hesitate to call.     Sincerely,        Daniella Santacruz MD

## 2020-12-09 NOTE — TELEPHONE ENCOUNTER
Okay to do so. Please fill any paperwork that is needed.  diagnosis severe nonischemic cardiomyopathy

## 2020-12-11 LAB — GLUCOSE BLD-MCNC: 118 MG/DL (ref 70–100)

## 2020-12-15 ENCOUNTER — TELEPHONE (OUTPATIENT)
Dept: CARDIOLOGY CLINIC | Age: 55
End: 2020-12-15

## 2020-12-15 ENCOUNTER — HOSPITAL ENCOUNTER (OUTPATIENT)
Dept: CT IMAGING | Age: 55
Discharge: HOME | End: 2020-12-15
Payer: COMMERCIAL

## 2020-12-15 PROCEDURE — 71250 CT THORAX DX C-: CPT

## 2020-12-15 NOTE — TELEPHONE ENCOUNTER
Per pt wife their insurance is requiring PA for Lisinopril.  Please call Janelle BRAND dept at [phone]

## 2020-12-16 RX ORDER — LISINOPRIL 10 MG/1
10 TABLET ORAL DAILY
Qty: 30 TABLET | Refills: 5 | Status: SHIPPED
Start: 2020-12-16 | End: 2020-12-22 | Stop reason: CLARIF

## 2020-12-16 RX ORDER — LISINOPRIL 5 MG/1
5 TABLET ORAL NIGHTLY
Qty: 30 TABLET | Refills: 5 | Status: SHIPPED
Start: 2020-12-16 | End: 2020-12-22 | Stop reason: CLARIF

## 2020-12-16 NOTE — TELEPHONE ENCOUNTER
Insurance will not pay for lisinopril written as take 10 mg in the AM and 5 mg in the pm.   Needs 2 separate RX's.       Rx's sent to pharmacy

## 2020-12-18 ENCOUNTER — TELEPHONE (OUTPATIENT)
Dept: CARDIOLOGY CLINIC | Age: 55
End: 2020-12-18

## 2020-12-18 NOTE — TELEPHONE ENCOUNTER
12/18 - Attempted to call pt, phone just kept ringing. Needs urgent appt with [facility], does have 2 available slots on 12/22. Unable to leave message.

## 2020-12-18 NOTE — TELEPHONE ENCOUNTER
----- Message from Joyce Patino MD sent at 12/14/2020  5:28 PM EST -----  I spoke to Dr. Brenden Don and reviewed the patient's PET scan. It shows extensive inflammation including the papillary muscles in addition to what is reported above. It however does not show any inflammation in the septum at this time. Patient does have a known left bundle branch block. No other significant electrical issues. Based on the above findings cardiac sarcoidosis is definitely in the differential but unfortunately we have no chest imaging. I spoke to patient and updated him on this. Please order a stat noncontrast chest CT to evaluate for sarcoid and also lymph adenopathy. Please also send a referral to Dr. Binh Campbell at [facility] pulmonary department. He can evaluate, biopsy any suspicious lesions and also potentially provide treatment for sarcoid if present. I will send a note to Dr. Toya Keita who he is going to see on January 5 as this may affect ICD placement.   I will also send a note to Dr. Shilpa Spangler as patient should likely be moved to advanced heart failure team

## 2020-12-18 NOTE — TELEPHONE ENCOUNTER
----- Message from Alejandro Russell MD sent at 12/17/2020 11:59 AM EST -----  I already talked to patient about the results.   Please make sure he has an urgent appointment with Dr. Nicholas Lopez to follow-up for evaluation

## 2020-12-18 NOTE — TELEPHONE ENCOUNTER
----- Message from Joyce Patino MD sent at 12/16/2020  3:19 PM EST -----  Please let patient know that I had reviewed the CT. Not show any findings consistent with sarcoid nor other any abnormal lymph nodes to biopsy at this point. Currently I would suggest referring the patient to Dr. Shilpa Spangler for evaluation of the above findings in addition to PET MRI showing active cardiac inflammation.   I can have her decide if she would like him to see Dr. Jodi Manning for sarcoid evaluation

## 2020-12-21 NOTE — PROGRESS NOTES
[address]  Advanced CHF/Pulmonary Hypertension   Cardiac Evaluation      Autumn Sandra  YOB: 1965    Date of Visit:  12/22/20      Chief Complaint   Patient presents with    New Patient    Shortness of Breath     on exertion    Swelling     bilateral feet and legs         History of Present Illness: Bushra Carlson is a 60 y.o. male who presents from referral from Dr Kimmie Perdomo for consultation and management of advanced heart failure. He presented to hospital on 10/12/20 with complaints of severe SOB, BLE edema and chest pain. Echo from 10/13/20 demonstrated EF 20-25%, moderate biatrial enlargement, mild TR and KS. His LHC/RHC 10/14/20 demonstrated severe nonischemic cardiomyopathy, no significant CAD. His cardiac WILLOW from 12/04/20 showed NICM concerning for inflammatory or infiltrative disease with biventricular involvement. His PET scan from 12/11/20 showed abnormal FDG uptake in the lateral wall of the left ventricle and the right atrium consistent with active myocardial inflammation. Severe global HK and severely impaired LV function at 17.6%. His CT chest from 12/15/20 showed the heart is enlarged with a small pericardial effusion. He is going to see  pulmonology soon. Dr Kimmie Perdomo has not had [facility] He reports he feels well overall. He has SOB on exertion and BLE edema which has been slowly improving. He reports he had increased fatigue for the last 1-2 years. He denies CP, palpitations, dizziness or syncope. He takes little naps during the day. He has been sleeping in his chair. He reports he was waking up feeling smothery. He has not slept in a bed since 09/2020.        No Known Allergies  Current Outpatient Medications   Medication Sig Dispense Refill    lisinopril (PRINIVIL;ZESTRIL) 10 MG tablet Take 1 tablet by mouth daily (Patient taking differently: Take 15 mg by mouth daily ) 30 tablet 5  lisinopril (PRINIVIL;ZESTRIL) 5 MG tablet Take 1 tablet by mouth nightly 30 tablet 5    furosemide (LASIX) 40 MG tablet Take 1 tablet by mouth daily 30 tablet 5    aspirin 81 MG chewable tablet Take 1 tablet by mouth daily 30 tablet 3    metoprolol succinate (TOPROL XL) 25 MG extended release tablet Take 1 tablet by mouth daily 30 tablet 3     No current facility-administered medications for this visit. History reviewed. No pertinent past medical history. History reviewed. No pertinent surgical history.   Family History   Problem Relation Age of Onset    Cancer Mother         skin     Cancer Father         skin, prostate     Cancer Sister         skin, Melanoma     Social History     Socioeconomic History    Marital status:      Spouse name: Not on file    Number of children: Not on file    Years of education: Not on file    Highest education level: Not on file   Occupational History    Not on file   Social Needs    Financial resource strain: Not on file    Food insecurity     Worry: Not on file     Inability: Not on file    Transportation needs     Medical: Not on file     Non-medical: Not on file   Tobacco Use    Smoking status: Never Smoker    Smokeless tobacco: Never Used   Substance and Sexual Activity    Alcohol use: Yes     Comment: rarely    Drug use: No    Sexual activity: Not on file   Lifestyle    Physical activity     Days per week: Not on file     Minutes per session: Not on file    Stress: Not on file   Relationships    Social connections     Talks on phone: Not on file     Gets together: Not on file     Attends Alevism service: Not on file     Active member of club or organization: Not on file     Attends meetings of clubs or organizations: Not on file     Relationship status: Not on file    Intimate partner violence     Fear of current or ex partner: Not on file     Emotionally abused: Not on file     Physically abused: Not on file Forced sexual activity: Not on file   Other Topics Concern    Not on file   Social History Narrative    Not on file       Review of Systems:   · Constitutional: there has been no unanticipated weight loss. There's been no change in energy level, sleep pattern, or activity level. · Eyes: No visual changes or diplopia. No scleral icterus. · ENT: No Headaches, hearing loss or vertigo. No mouth sores or sore throat. · Cardiovascular: Reviewed in HPI  · Respiratory: No cough or wheezing, no sputum production. No hematemesis. · Gastrointestinal: No abdominal pain, appetite loss, blood in stools. No change in bowel or bladder habits. · Genitourinary: No dysuria, trouble voiding, or hematuria. · Musculoskeletal:  No gait disturbance, weakness or joint complaints. · Integumentary: No rash or pruritis. · Neurological: No headache, diplopia, change in muscle strength, numbness or tingling. No change in gait, balance, coordination, mood, affect, memory, mentation, behavior. · Psychiatric: No anxiety, no depression. · Endocrine: No malaise, fatigue or temperature intolerance. No excessive thirst, fluid intake, or urination. No tremor. · Hematologic/Lymphatic: No abnormal bruising or bleeding, blood clots or swollen lymph nodes. · Allergic/Immunologic: No nasal congestion or hives. Physical Examination:    Vitals:    12/22/20 1053   BP: 102/70   Pulse: 91   SpO2: 97%   Weight: 200 lb (90.7 kg)   Height: 5' 10\" (1.778 m)     Body mass index is 28.7 kg/m².      Wt Readings from Last 3 Encounters:   12/22/20 200 lb (90.7 kg)   11/20/20 194 lb 8 oz (88.2 kg)   10/28/20 194 lb 8 oz (88.2 kg)     BP Readings from Last 3 Encounters:   12/22/20 102/70   11/20/20 100/70   10/28/20 110/80          Constitutional and General Appearance:   WD/WN in NAD  HEENT:  NC/AT  TAMIE  No problems with hearing  Skin:  Warm, dry  Respiratory:  · Normal excursion and expansion without use of accessory muscles · Resp Auscultation: Normal breath sounds without dullness  Cardiovascular:  · The apical impulses not displaced  · Heart tones are crisp and normal  · Cervical veins are not engorged  · The carotid upstroke is normal in amplitude and contour without delay or bruit  · JVP less than 8 cm H2O  RRR with nl S1 and S2 without m,r,g  · Peripheral pulses are symmetrical and full  · There is no clubbing, cyanosis of the extremities. · No edema  · Femoral Arteries: 2+ and equal  · Pedal Pulses: 2+ and equal   Neck:  · No thyromegaly  Abdomen:  · No masses or tenderness  · Liver/Spleen: No Abnormalities Noted  Neurological/Psychiatric:  · Alert and oriented in all spheres  · Moves all extremities well  · Exhibits normal gait balance and coordination  · No abnormalities of mood, affect, memory, mentation, or behavior are noted    Echo: 10/13/20  The left ventricular systolic function is severely reduced with an ejection   fraction of 20 - 25 %. There is hypokinesis of the apex, apical lateral, apical septum, anterior,   inferior, anteroseptum and apical anterior walls. Left ventricular cavity size is mildly dilated. Left ventricular diastolic filling pressure is elevated. Moderate mitral regurgitation. Mild Bi-atrial enlargement. The right ventricle is mildly enlarged. Right ventricular systolic function is mildly reduced . Mild tricuspid and pulmonic regurgitation. There is a left pleural effusion. Cardiac cath: 10/14/20  Findings:      LEFT HEART CATH  LM: ostial pinch (MLA by IVUS was >30)  LAD: luminals, luminals calcification  Ramus: luminals  LCX: Luminals  RCA: dominant, sluggish flow. KEVIN-2 flow. LVEDP: 15  LVEF: <20%, severe global hypokinesis.                 No MR, No AS        RIGHT HEART CATH  RA: 6   RV: 33/6   PA: 33/15   and mean of  22  PCWP: 15     Sats: on RA  Ao: 89%  RA:  59%  PA: 61%     CO/CI: 4.5/2.2 (stephanie)  SVR/PVR: 1272/122        Assessment 1.  Severe nonischemic cardiomyopathy. LVEF less than 20%                -We will start secondary work-up                - will consider LifeVest  2. No significant CAD. Luminal calcification  3.  Relatively normal mildly elevated intracardiac filling pressures. MRI card: 12/4/20  IMPRESSION: Findings are most consistent with non-ischemic cardiomyopathy concerning for inflammatory or infiltrative disease with biventricular involvement. Differential includes subacute myocarditis or an infiltrative process such as cardiac sarcoidosis. Recommend FDG PET for further evaluation for sarcoidosis. There is four chamber dilation with severely reduced biventricular systolic function. There is evidence of decompensated heart failure. PET: 12/11/20  IMPRESSION: 1. Abnormal FDG uptake in the lateral wall of the left ventricle, and the right atrium consistent with active myocardial inflammation. 2. Severely impaired left ventricular function with severe global hypokinesis. 3. Normal resting left ventricular perfusion. 4. Moderate bilateral pleural effusion. CT chest: 12/15/20  FINDINGS: Mediastinum: The heart is enlarged with a small pericardial effusion. Coronary artery calcifications are a marker of atherosclerosis. Scattered mediastinal lymph nodes are not pathologic by CT criteria. The lizbeth not be evaluated in the absence of intravenous contrast.  Lungs/pleura: The tracheobronchial tree is patent. There is no pneumothorax. There small to moderate bilateral pleural effusions with atelectasis, right greater than left. There is biapical scarring. There is mild bilateral interstitial thickening within the bases likely due to interstitial edema. Upper Abdomen: Images of the upper abdomen are unremarkable. Soft Tissues/Bones: Degenerative changes involve the thoracic spine. Labs were reviewed including labs from other hospital systems through [facility]. Cardiac testing was reviewed including echos, nuclear scans, cardiac catheterization, including from other hospital systems through [facility]. Assessment:    1. Systolic CHF, chronic ([facility] [address]    2. Non-ischemic cardiomyopathy ([facility] [address]    3. NSVT (nonsustained ventricular tachycardia) ([facility] [address]    4. Left bundle branch block    5. Hypertension, unspecified type    6. SOB (shortness of breath)          Plan:  1. Stop lisinopril  2. Start entresto 1/2 tablet of 49/51 mg twice daily 36 hours after last dose of lisinopril which will be tomorrow night (12/23/20). 3. Start digoxin 125 mcg daily  4. CRP, sed rate, BMP and BNP in 2 weeks  5. Pulmonology referral to DR China Harkins office [phone] fax [phone] to rule out sarcoidosis, given his very abnormal PET SCAN  6. Follow up 01/05/21 at 12:45    Continue to wear LifeVest.        Scribe's attestation: This note was scribed in the presence of [place] Osler, M.D. By [facility] RN     The scribe's documentation has been prepared under my direction and personally reviewed by me in its entirety. I confirm that the note above accurately reflects all work, treatment, procedures, and medical decision making performed by me. I spent 80 minutes of face to face time with the patient with more than 50% of the time spent counseling and coordinating care for severe heart failure, new onset and discussing with patient and Dr. Emile Sewell the plan for managing his new diagnosis. I appreciate the opportunity of cooperating in the care of this patient.     [place] Osler, M.D., [facility]

## 2020-12-22 ENCOUNTER — OFFICE VISIT (OUTPATIENT)
Dept: CARDIOLOGY CLINIC | Age: 55
End: 2020-12-22
Payer: COMMERCIAL

## 2020-12-22 ENCOUNTER — TELEPHONE (OUTPATIENT)
Dept: CARDIOLOGY | Age: 55
End: 2020-12-22

## 2020-12-22 ENCOUNTER — HOSPITAL ENCOUNTER (OUTPATIENT)
Age: 55
Discharge: HOME | End: 2020-12-22
Payer: COMMERCIAL

## 2020-12-22 VITALS
OXYGEN SATURATION: 97 % | SYSTOLIC BLOOD PRESSURE: 102 MMHG | DIASTOLIC BLOOD PRESSURE: 70 MMHG | WEIGHT: 200 LBS | HEIGHT: 70 IN | BODY MASS INDEX: 28.63 KG/M2 | HEART RATE: 91 BPM

## 2020-12-22 LAB
ALBUMIN SERPL-MCNC: 3.9 G/DL (ref 3.4–5)
ANION GAP SERPL CALCULATED.3IONS-SCNC: 14 MMOL/L (ref 3–16)
BUN BLDV-MCNC: 23 MG/DL (ref 7–20)
C-REACTIVE PROTEIN: 2.5 MG/L (ref 0–5.1)
CALCIUM SERPL-MCNC: 9.1 MG/DL (ref 8.3–10.6)
CHLORIDE BLD-SCNC: 102 MMOL/L (ref 99–110)
CO2: 26 MMOL/L (ref 21–32)
CREAT SERPL-MCNC: 1.3 MG/DL (ref 0.9–1.3)
GFR AFRICAN AMERICAN: >60
GFR NON-AFRICAN AMERICAN: 57
GLUCOSE BLD-MCNC: 85 MG/DL (ref 70–99)
MAGNESIUM: 2.1 MG/DL (ref 1.8–2.4)
PHOSPHORUS: 4.7 MG/DL (ref 2.5–4.9)
POTASSIUM SERPL-SCNC: 4 MMOL/L (ref 3.5–5.1)
PRO-BNP: 8201 PG/ML (ref 0–124)
SEDIMENTATION RATE, ERYTHROCYTE: 6 MM/HR (ref 0–20)
SODIUM BLD-SCNC: 142 MMOL/L (ref 136–145)

## 2020-12-22 PROCEDURE — 83880 ASSAY OF NATRIURETIC PEPTIDE: CPT

## 2020-12-22 PROCEDURE — 36415 COLL VENOUS BLD VENIPUNCTURE: CPT

## 2020-12-22 PROCEDURE — 86140 C-REACTIVE PROTEIN: CPT

## 2020-12-22 PROCEDURE — 99245 OFF/OP CONSLTJ NEW/EST HI 55: CPT | Performed by: INTERNAL MEDICINE

## 2020-12-22 PROCEDURE — 80069 RENAL FUNCTION PANEL: CPT

## 2020-12-22 PROCEDURE — 85652 RBC SED RATE AUTOMATED: CPT

## 2020-12-22 PROCEDURE — 83735 ASSAY OF MAGNESIUM: CPT

## 2020-12-22 RX ORDER — DIGOXIN 125 MCG
125 TABLET ORAL DAILY
Qty: 30 TABLET | Refills: 11 | Status: SHIPPED | OUTPATIENT
Start: 2020-12-22 | End: 2021-12-20 | Stop reason: SDUPTHER

## 2020-12-22 RX ORDER — PREDNISONE 20 MG/1
40 TABLET ORAL DAILY
Qty: 60 TABLET | Refills: 0 | Status: CANCELLED | OUTPATIENT
Start: 2020-12-22 | End: 2021-01-21

## 2020-12-22 RX ORDER — SACUBITRIL AND VALSARTAN 49; 51 MG/1; MG/1
1 TABLET, FILM COATED ORAL 2 TIMES DAILY
Qty: 60 TABLET | Refills: 0 | Status: SHIPPED | OUTPATIENT
Start: 2020-12-22 | End: 2021-01-05 | Stop reason: SDUPTHER

## 2020-12-22 RX ORDER — PREDNISONE 20 MG/1
40 TABLET ORAL DAILY
Qty: 60 TABLET | Refills: 1 | Status: SHIPPED | OUTPATIENT
Start: 2020-12-22 | End: 2021-01-05 | Stop reason: SDUPTHER

## 2020-12-22 NOTE — PATIENT INSTRUCTIONS
Plan:  1. Stop lisinopril  2. Start entresto 1/2 tablet of 49/51 mg twice daily 36 hours after last dose of lisinopril which will be tomorrow night (12/23/20). 3. Start digoxin 125 mcg daily  4. CRP, sed rate, BMP and BNP in 2 weeks  5. Pulmonology referral to DR Wesley [facility] office [phone] fax [phone].  Follow up 01/05/21 at 12:45

## 2020-12-22 NOTE — PROGRESS NOTES
[facility]   Cardiac Consultation    Date: 12/23/20  Patient Name: Mehnaz Rhodes  YOB: 1965    Primary Care Provider: Angelique Ruiz MD    CHIEF COMPLAINT:   Chief Complaint   Patient presents with    New Patient    Other     Discuss ICD implant    Shortness of Breath    Tachycardia     VT    Other     NICM     HPI:  Mehnaz Rhodes is a 60 y.o. male with a past medical history of CHF. He presented to hospital on 10/12/20 with complaints of severe SOB, BLE edema and chest pain. Echo from 10/13/20 demonstrated EF 20-25%, moderate biatrial enlargement, mild TR and AZ. His LHC/RHC 10/14/20 demonstrated severe nonischemic cardiomyopathy, no significant CAD. His cardiac MRI from 12/04/20 showed NICM concerning for inflammatory or infiltrative disease with biventricular involvement. His PET scan from 12/11/20 showed abnormal FDG uptake in the lateral wall of the left ventricle and the right atrium consistent with active myocardial inflammation. Severe global HK and severely impaired LV function at 17.6%. His CT chest from 12/15/20 showed the heart is enlarged with a small pericardial effusion. He is going to see  pulmonology soon. Dr Diaz has not had genetic testing yet but [facility]. On 11/20/2020, he reported feeling well overall. He has SOB on exertion and BLE edema which has been slowly improving. He reported he had increased fatigue for the last 1-2 years. He takes little naps during the day. He has been sleeping in his chair. He reported he was waking up feeling smothery. He has not slept in a bed since 09/2020. On 12/22/2020, he reported that after leaving the hospital he started having fluid build up and started taking furosemide once daily. He began wearing Lifevest on 10/16/2020. He has been on uninterrupted GDMT since 10/16/2020. Today, 12/23/2020, patient reports that his SOB has gotten a little better over the last few months. He reports that after he was hospitalized in 10/2020, he would get PEREZ walking 10 feet. He reports that he is now able to ascend a flight of stairs without stopping, but gets very SOB. He reports he has noticed edema around his abdomen. He is not been very active due to SOB and fatigue. He reports he was sleeping when he experienced VT on 12/13/2020. He had a 10.5 second episode of monomorphic VT with a 360 msec cycle length at 1:24PM. He states he woke up and his Lifevest showed an orange screen that said \"patient respond\", he pushed the button and went back to sleep. He reports he has been taking medications as prescribed and tolerates them well. Patient denies current chest pain, sob, palpitations, dizziness or syncope. Past Medical History:   has no past medical history on file. Surgical History:   has no past surgical history on file. Social History:   reports that he has never smoked. He has never used smokeless tobacco. He reports current alcohol use. He reports that he does not use drugs. Family History:  family history includes Cancer in his father, mother, and sister.      Home Medications:  Outpatient Encounter Medications as of 12/23/2020   Medication Sig Dispense Refill    sacubitril-valsartan (ENTRESTO) 49-51 MG per tablet Take 1 tablet by mouth 2 times daily 60 tablet 0    digoxin (LANOXIN) 125 MCG tablet Take 1 tablet by mouth daily 30 tablet 11    predniSONE (DELTASONE) 20 MG tablet Take 2 tablets by mouth daily 60 tablet 1    furosemide (LASIX) 40 MG tablet Take 1 tablet by mouth daily 30 tablet 5    aspirin 81 MG chewable tablet Take 1 tablet by mouth daily 30 tablet 3    metoprolol succinate (TOPROL XL) 25 MG extended release tablet Take 1 tablet by mouth daily (Patient taking differently: Take 50 mg by mouth daily ) 30 tablet 3 Moderate mitral regurgitation. Mild Bi-atrial enlargement. The right ventricle is mildly enlarged. Right ventricular systolic function is mildly reduced . Mild tricuspid and pulmonic regurgitation. There is a left pleural effusion. Objective:  Physical Exam   Constitutional: He is oriented to person, place, and time. He appears well-developed and well-nourished. HENT:   Head: Normocephalic and atraumatic. Eyes: Pupils are equal, round, and reactive to light. Neck: Normal range of motion. Cardiovascular: Normal rate, regular rhythm and normal heart sounds. Pulmonary/Chest: Effort normal and breath sounds normal.   Abdominal: Soft. No tenderness. Musculoskeletal: Normal range of motion. He exhibits no edema. Neurological: He is alert and oriented to person, place, and time. Skin: Skin is warm and dry. +1 Edema BLE. Psychiatric: He has a normal mood and affect. Assessment:  1. Bertrum Bart function remains poor on appropriate guideline directed medical therapy. 2. NSVT-he had monomorphic NSVT. This would suggest that he has some fixed myocardial scar as substrate. Although programmed stimulation is typically not very useful in the presence of nonischemic cardiomyopathy, it could provide benefit in the presence of fixed myocardial scar. That notwithstanding, the patient may qualify for a primary prevention defibrillator in 2 more weeks if his ventricular function has not recovered. On that basis, I do not plan to pursue diagnostic electrophysiology studies unless the echo demonstrates an ejection fraction between 35% and 45%. 3. LBBB    Plan:  1. Discussed need, risks, and benefits of CRT-D placement to prevent sudden cardiac death and improve heart failure symptoms. 2. Limited Echo on or soon after 1/17/2020 as patient will be on optimal GDMT for 90 days.   -instructed patient to call to have echo rescheduled to later date as it is scheduled <90 days from initiation of GDMT. -if echo still <35% EF, recommend proceeding with CRT-D ASAP. -may need to stay over night. 3. No medication changes at this time. Continue medications as prescribed. 4. Follow up with pulmonology as planned. 5. Follow up depending on Echo results. QUALITY MEASURES  1. Tobacco Cessation Counseling: NA  2. Retake of BP if >140/90:   NA  3. Documentation to PCP/referring for new patient:  Sent to PCP at close of office visit  4. CAD patient on anti-platelet: NA  5. CAD patient on STATIN therapy:  NA  6. Patient with CHF and aFib on anticoagulation:  NA    This note has been scribed in the presence of Jr Ohara MD by Goyo German RN.     I, Dr. Jr Ohara, personally performed the services described in this documentation as scribed by Goyo German RN  in my presence, and it is both accurate and complete.       Jr Ohara M.D.

## 2020-12-22 NOTE — TELEPHONE ENCOUNTER
Called by Noah Cabrera today to report episode of NSVT on 12/13/20, monomorphic VT that self terminated. Patient states he did not feel this. We will go ahead and call patient and increase his Toprol up to 50 mg daily as tolerated. We will also ask him to get renal and magnesium. Patient will see Dr. Evaristo Gomez tomorrow to discuss possible EP study and I will also reach out to Dr. Robyn Wilkins about considering steroids for active inflammation/myocarditis. #1 patient will go to the lab and get a renal, magnesium, CRP and sed rate today. 2.  Dr. Evaristo Gomez will see patient in office at [phone] tomorrow to consider EP study and ICD. 3.  Discussed with Dr. Robyn Wilkins.   We will start prednisone 40 mg p.o. daily x2 weeks and reassess

## 2020-12-22 NOTE — LETTER
[address]  Advanced CHF/Pulmonary Hypertension   Cardiac Evaluation      [place] Prior Sandra  YOB: 1965    Date of Visit:  12/22/20      Chief Complaint   Patient presents with    New Patient    Shortness of Breath     on exertion    Swelling     bilateral feet and legs         History of Present Illness: Cachorro Martinez is a 60 y.o. male who presents from referral from Dr Chauncey Yousif for consultation and management of advanced heart failure. He presented to hospital on 10/12/20 with complaints of severe SOB, BLE edema and chest pain. Echo from 10/13/20 demonstrated EF 20-25%, moderate biatrial enlargement, mild TR and MT. His LHC/RHC 10/14/20 demonstrated severe nonischemic cardiomyopathy, no significant CAD. His cardiac WILLOW from 12/04/20 showed NICM concerning for inflammatory or infiltrative disease with biventricular involvement. His PET scan from 12/11/20 showed abnormal FDG uptake in the lateral wall of the left ventricle and the right atrium consistent with active myocardial inflammation. Severe global HK and severely impaired LV function at 17.6%. His CT chest from 12/15/20 showed the heart is enlarged with a small pericardial effusion. He is going to see  pulmonology soon. Dr Chauncey Yousif has not had [facility] yet [facility] He reports he feels well overall. He has SOB on exertion and BLE edema which has been slowly improving. He reports he had increased fatigue for the last 1-2 years. He denies CP, palpitations, dizziness or syncope. He takes little naps during the day. He has been sleeping in his chair. He reports he was waking up feeling smothery. He has not slept in a bed since 09/2020.        No Known Allergies  Current Outpatient Medications   Medication Sig Dispense Refill    lisinopril (PRINIVIL;ZESTRIL) 10 MG tablet Take 1 tablet by mouth daily (Patient taking differently: Take 15 mg by mouth daily ) 30 tablet 5  lisinopril (PRINIVIL;ZESTRIL) 5 MG tablet Take 1 tablet by mouth nightly 30 tablet 5    furosemide (LASIX) 40 MG tablet Take 1 tablet by mouth daily 30 tablet 5    aspirin 81 MG chewable tablet Take 1 tablet by mouth daily 30 tablet 3    metoprolol succinate (TOPROL XL) 25 MG extended release tablet Take 1 tablet by mouth daily 30 tablet 3     No current facility-administered medications for this visit. History reviewed. No pertinent past medical history. History reviewed. No pertinent surgical history.   Family History   Problem Relation Age of Onset    Cancer Mother         skin     Cancer Father         skin, prostate     Cancer Sister         skin, Melanoma     Social History     Socioeconomic History    Marital status:      Spouse name: Not on file    Number of children: Not on file    Years of education: Not on file    Highest education level: Not on file   Occupational History    Not on file   Social Needs    Financial resource strain: Not on file    Food insecurity     Worry: Not on file     Inability: Not on file    Transportation needs     Medical: Not on file     Non-medical: Not on file   Tobacco Use    Smoking status: Never Smoker    Smokeless tobacco: Never Used   Substance and Sexual Activity    Alcohol use: Yes     Comment: rarely    Drug use: No    Sexual activity: Not on file   Lifestyle    Physical activity     Days per week: Not on file     Minutes per session: Not on file    Stress: Not on file   Relationships    Social connections     Talks on phone: Not on file     Gets together: Not on file     Attends [employer] service: Not on file     Active member of club or organization: Not on file     Attends meetings of clubs or organizations: Not on file     Relationship status: Not on file    Intimate partner violence     Fear of current or ex partner: Not on file     Emotionally abused: Not on file     Physically abused: Not on file Forced sexual activity: Not on file   Other Topics Concern    Not on file   Social History Narrative    Not on file       Review of Systems:   · Constitutional: there has been no unanticipated weight loss. There's been no change in energy level, sleep pattern, or activity level. · Eyes: No visual changes or diplopia. No scleral icterus. · ENT: No Headaches, hearing loss or vertigo. No mouth sores or sore throat. · Cardiovascular: Reviewed in HPI  · Respiratory: No cough or wheezing, no sputum production. No hematemesis. · Gastrointestinal: No abdominal pain, appetite loss, blood in stools. No change in bowel or bladder habits. · Genitourinary: No dysuria, trouble voiding, or hematuria. · Musculoskeletal:  No gait disturbance, weakness or joint complaints. · Integumentary: No rash or pruritis. · Neurological: No headache, diplopia, change in muscle strength, numbness or tingling. No change in gait, balance, coordination, mood, affect, memory, mentation, behavior. · Psychiatric: No anxiety, no depression. · Endocrine: No malaise, fatigue or temperature intolerance. No excessive thirst, fluid intake, or urination. No tremor. · Hematologic/Lymphatic: No abnormal bruising or bleeding, blood clots or swollen lymph nodes. · Allergic/Immunologic: No nasal congestion or hives. Physical Examination:    Vitals:    12/22/20 1053   BP: 102/70   Pulse: 91   SpO2: 97%   Weight: 200 lb (90.7 kg)   Height: 5' 10\" (1.778 m)     Body mass index is 28.7 kg/m².      Wt Readings from Last 3 Encounters:   12/22/20 200 lb (90.7 kg)   11/20/20 194 lb 8 oz (88.2 kg)   10/28/20 194 lb 8 oz (88.2 kg)     BP Readings from Last 3 Encounters:   12/22/20 102/70   11/20/20 100/70   10/28/20 110/80          Constitutional and General Appearance:   WD/WN in NAD  HEENT:  NC/AT  TAMIE  No problems with hearing  Skin:  Warm, dry  Respiratory:  · Normal excursion and expansion without use of accessory muscles · Resp Auscultation: Normal breath sounds without dullness  Cardiovascular:  · The apical impulses not displaced  · Heart tones are crisp and normal  · Cervical veins are not engorged  · The carotid upstroke is normal in amplitude and contour without delay or bruit  · JVP less than 8 cm H2O  RRR with nl S1 and S2 without m,r,g  · Peripheral pulses are symmetrical and full  · There is no clubbing, cyanosis of the extremities. · No edema  · Femoral Arteries: 2+ and equal  · Pedal Pulses: 2+ and equal   Neck:  · No thyromegaly  Abdomen:  · No masses or tenderness  · Liver/Spleen: No Abnormalities Noted  Neurological/Psychiatric:  · Alert and oriented in all spheres  · Moves all extremities well  · Exhibits normal gait balance and coordination  · No abnormalities of mood, affect, memory, mentation, or behavior are noted    Echo: 10/13/20  The left ventricular systolic function is severely reduced with an ejection   fraction of 20 - 25 %. There is hypokinesis of the apex, apical lateral, apical septum, anterior,   inferior, anteroseptum and apical anterior walls. Left ventricular cavity size is mildly dilated. Left ventricular diastolic filling pressure is elevated. Moderate mitral regurgitation. Mild Bi-atrial enlargement. The right ventricle is mildly enlarged. Right ventricular systolic function is mildly reduced . Mild tricuspid and pulmonic regurgitation. There is a left pleural effusion. Cardiac cath: 10/14/20  Findings:      LEFT HEART CATH  LM: ostial pinch (MLA by IVUS was >30)  LAD: luminals, luminals calcification  Ramus: luminals  LCX: Luminals  RCA: dominant, sluggish flow. KEVIN-2 flow. LVEDP: 15  LVEF: <20%, severe global hypokinesis.                 No MR, No AS        RIGHT HEART CATH  RA: 6   RV: 33/6   PA: 33/15   and mean of  22  PCWP: 15     Sats: on RA  Ao: 89%  RA:  59%  PA: 61%     CO/CI: 4.5/2.2 (stephanie)  SVR/PVR: 1272/122        Assessment 1.  Severe nonischemic cardiomyopathy. LVEF less than 20%                -We will start secondary work-up                - will consider LifeVest  2. No significant CAD. Luminal calcification  3.  Relatively normal mildly elevated intracardiac filling pressures. MRI card: 12/4/20  IMPRESSION: Findings are most consistent with non-ischemic cardiomyopathy concerning for inflammatory or infiltrative disease with biventricular involvement. Differential includes subacute myocarditis or an infiltrative process such as cardiac sarcoidosis. Recommend FDG PET for further evaluation for sarcoidosis. There is four chamber dilation with severely reduced biventricular systolic function. There is evidence of decompensated heart failure. PET: 12/11/20  IMPRESSION: 1. Abnormal FDG uptake in the lateral wall of the left ventricle, and the right atrium consistent with active myocardial inflammation. 2. Severely impaired left ventricular function with severe global hypokinesis. 3. Normal resting left ventricular perfusion. 4. Moderate bilateral pleural effusion. CT chest: 12/15/20  FINDINGS: Mediastinum: The heart is enlarged with a small pericardial effusion. Coronary artery calcifications are a marker of atherosclerosis. Scattered mediastinal lymph nodes are not pathologic by CT criteria. The lizbeth not be evaluated in the absence of intravenous contrast.  Lungs/pleura: The tracheobronchial tree is patent. There is no pneumothorax. There small to moderate bilateral pleural effusions with atelectasis, right greater than left. There is biapical scarring. There is mild bilateral interstitial thickening within the bases likely due to interstitial edema. Upper Abdomen: Images of the upper abdomen are unremarkable. Soft Tissues/Bones: Degenerative changes involve the thoracic spine. Labs were reviewed including labs from other hospital systems through [facility]. Cardiac testing was reviewed including echos, nuclear scans, cardiac catheterization, including from other hospital systems through [facility]. Assessment:    1. Systolic CHF, chronic ([facility]    2. Non-ischemic cardiomyopathy ([facility] [facility]    3. NSVT (nonsustained ventricular tachycardia) ([facility] [facility]    4. Left bundle branch block    5. Hypertension, unspecified type    6. SOB (shortness of breath)          Plan:  1. Stop lisinopril  2. Start entresto 1/2 tablet of 49/51 mg twice daily 36 hours after last dose of lisinopril which will be tomorrow night (12/23/20). 3. Start digoxin 125 mcg daily  4. CRP, sed rate, BMP and BNP in 2 weeks  5. Pulmonology referral to DR Neda Ahumada office [phone] fax [phone]. Follow up 01/05/21 at 12:45        Scribe's attestation: This note was scribed in the presence of Alisha Schaffer M.D. By Alda Martines RN       I appreciate the opportunity of cooperating in the care of this patient.     Alisha Schaffer M.D., [facility] [place] [facility]

## 2020-12-23 ENCOUNTER — OFFICE VISIT (OUTPATIENT)
Dept: CARDIOLOGY CLINIC | Age: 55
End: 2020-12-23
Payer: COMMERCIAL

## 2020-12-23 VITALS
HEIGHT: 70 IN | BODY MASS INDEX: 28.63 KG/M2 | WEIGHT: 200 LBS | SYSTOLIC BLOOD PRESSURE: 120 MMHG | HEART RATE: 90 BPM | OXYGEN SATURATION: 97 % | DIASTOLIC BLOOD PRESSURE: 60 MMHG

## 2020-12-23 PROCEDURE — 93000 ELECTROCARDIOGRAM COMPLETE: CPT | Performed by: INTERNAL MEDICINE

## 2020-12-23 PROCEDURE — 99204 OFFICE O/P NEW MOD 45 MIN: CPT | Performed by: INTERNAL MEDICINE

## 2020-12-23 NOTE — PATIENT INSTRUCTIONS
Plan:  1. Discussed need, risks, and benefits of CRT-D placement to prevent sudden cardiac death and improve heart failure symptoms. 2. Limited Echo on or soon after 1/17/2020 as patient will be on GDT for 90 days.   -instructed patient to call to have echo rescheduled to later date as it is scheduled <90 days from initiation of GDT. -if echo still <35% EF, recommend proceeding with CRT-D ASAP. -may need to stay over night. 3. No medication changes at this time. Continue medications as prescribed. 4. Follow up with pulmonology as planned. 5. Follow up depending on Echo results. Your provider has ordered testing for further evaluation. An order/prescription has been included in your paper work. ? To schedule outpatient testing, contact Central Scheduling by calling [address] [place] [address] ([phone]).

## 2020-12-24 ENCOUNTER — TELEPHONE (OUTPATIENT)
Dept: CARDIOLOGY CLINIC | Age: 55
End: 2020-12-24

## 2020-12-24 NOTE — TELEPHONE ENCOUNTER
----- Message from Connie De León MD sent at 12/23/2020 10:04 PM EST -----  One more thing, lets get dig level. Thank you.        Let patient know their lab tests suggest he may have incrs fluid, lets repeat these next week with bmp, mag level, bnp to reassess, and currently would continue current meds, no new orders or changes at this time.  Thanks.

## 2020-12-29 ENCOUNTER — HOSPITAL ENCOUNTER (OUTPATIENT)
Age: 55
Discharge: HOME | End: 2020-12-29
Payer: COMMERCIAL

## 2020-12-29 LAB
ANION GAP SERPL CALCULATED.3IONS-SCNC: 10 MMOL/L (ref 3–16)
BUN BLDV-MCNC: 26 MG/DL (ref 7–20)
CALCIUM SERPL-MCNC: 9.3 MG/DL (ref 8.3–10.6)
CHLORIDE BLD-SCNC: 102 MMOL/L (ref 99–110)
CO2: 27 MMOL/L (ref 21–32)
CREAT SERPL-MCNC: 0.9 MG/DL (ref 0.9–1.3)
DIGOXIN LEVEL: 1.2 NG/ML (ref 0.8–2)
GFR AFRICAN AMERICAN: >60
GFR NON-AFRICAN AMERICAN: >60
GLUCOSE BLD-MCNC: 163 MG/DL (ref 70–99)
MAGNESIUM: 2.3 MG/DL (ref 1.8–2.4)
POTASSIUM SERPL-SCNC: 4.4 MMOL/L (ref 3.5–5.1)
PRO-BNP: 5785 PG/ML (ref 0–124)
SODIUM BLD-SCNC: 139 MMOL/L (ref 136–145)

## 2020-12-29 PROCEDURE — 80162 ASSAY OF DIGOXIN TOTAL: CPT

## 2020-12-29 PROCEDURE — 83735 ASSAY OF MAGNESIUM: CPT

## 2020-12-29 PROCEDURE — 83880 ASSAY OF NATRIURETIC PEPTIDE: CPT

## 2020-12-29 PROCEDURE — 80048 BASIC METABOLIC PNL TOTAL CA: CPT

## 2020-12-29 PROCEDURE — 36415 COLL VENOUS BLD VENIPUNCTURE: CPT

## 2020-12-29 NOTE — PROGRESS NOTES
[address]  Advanced CHF/Pulmonary Hypertension   Cardiac Evaluation      Cayla Sandra  YOB: 1965    Date of Visit:  1/5/21      Chief Complaint   Patient presents with    Follow-up    Congestive Heart Failure         History of Present Illness: Lennie Cowan is a 60 y.o. male who presents from referral from Dr Jane Mcfarlane for consultation and management of advanced heart failure. He presented to hospital on 10/12/20 with complaints of severe SOB, BLE edema and chest pain. Echo from 10/13/20 demonstrated EF 20-25%, moderate biatrial enlargement, mild TR and MD. His LHC/RHC 10/14/20 demonstrated severe nonischemic cardiomyopathy, no significant CAD. His cardiac WILLOW from 12/04/20 showed NICM concerning for inflammatory or infiltrative disease with biventricular involvement. His PET scan from 12/11/20 showed abnormal FDG uptake in the lateral wall of the left ventricle and the right atrium consistent with active myocardial inflammation. Severe global HK and severely impaired LV function at 17.6%. His CT chest from 12/15/20 showed the heart is enlarged with a small pericardial effusion. He is going to see  pulmonology soon. Dr Jane Mcfarlane has not had genetic testing yet but [facility]. He reports he feels well overall. He has SOB on exertion and BLE edema which has been slowly improving. He reports he had increased fatigue for the last 1-2 years. He denies CP, palpitations, dizziness or syncope. He takes little naps during the day. He has been sleeping in his chair. He reports he was waking up feeling smothery. He has not slept in a bed since 09/2020. At his OV on 12/22/20, his lisinopril was stopped. He was started on Entresto 1/2 tab of 49/51 mg twice daily and digoxin 125 mcg daily. He reported he was sleeping when he experienced VT on 12/13/2020.  He had a 10.5 second episode of monomorphic VT with a 360 msec cycle length at 1:24PM. He states he woke up and his Lifevest showed an orange screen that said \"patient respond\", he pushed the button and went back to sleep. Today he reports DR Marko Joshi said he is 70% positive for sarcoidosis. He has been taking prednisone 40 mg daily. DR Marko Joshi started methotrexate. He reports he has not had any more alarms from his Life Vest. He is tolerating the entresto and digoxin well. He is taking lasix 40 mg daily. He reports he has urinary urgency in the first few hours after taking lasix. His SOB has improved. He denies CP, palpitations, dizziness or syncope. He reports he feels better and has more energy. His weight is down almost 20 pounds. His glucose is elevated. No Known Allergies  Current Outpatient Medications   Medication Sig Dispense Refill    methotrexate (RHEUMATREX) 2.5 MG chemo tablet Take by mouth every other day      folic acid (FOLVITE) 1 MG tablet Take 1 mg by mouth daily      Multiple Vitamins-Minerals (THERAPEUTIC MULTIVITAMIN-MINERALS) tablet Take 1 tablet by mouth daily      metoprolol succinate (TOPROL XL) 50 MG extended release tablet Take 1 tablet by mouth daily 30 tablet 5    sacubitril-valsartan (ENTRESTO) 49-51 MG per tablet Take 1 tablet by mouth 2 times daily (Patient taking differently: Take 0.5 tablets by mouth 2 times daily ) 60 tablet 0    digoxin (LANOXIN) 125 MCG tablet Take 1 tablet by mouth daily 30 tablet 11    predniSONE (DELTASONE) 20 MG tablet Take 2 tablets by mouth daily 60 tablet 1    furosemide (LASIX) 40 MG tablet Take 1 tablet by mouth daily 30 tablet 5    aspirin 81 MG chewable tablet Take 1 tablet by mouth daily 30 tablet 3     No current facility-administered medications for this visit. History reviewed. No pertinent past medical history. History reviewed. No pertinent surgical history.   Family History   Problem Relation Age of Onset    Cancer Mother         skin     Cancer Father         skin, prostate     Cancer Sister         skin, Melanoma     Social History Socioeconomic History    Marital status:      Spouse name: Not on file    Number of children: Not on file    Years of education: Not on file    Highest education level: Not on file   Occupational History    Not on file   Social Needs    Financial resource strain: Not on file    Food insecurity     Worry: Not on file     Inability: Not on file    Transportation needs     Medical: Not on file     Non-medical: Not on file   Tobacco Use    Smoking status: Never Smoker    Smokeless tobacco: Never Used   Substance and Sexual Activity    Alcohol use: Yes     Comment: rarely    Drug use: No    Sexual activity: Not on file   Lifestyle    Physical activity     Days per week: Not on file     Minutes per session: Not on file    Stress: Not on file   Relationships    Social connections     Talks on phone: Not on file     Gets together: Not on file     Attends Mu-ism service: Not on file     Active member of club or organization: Not on file     Attends meetings of clubs or organizations: Not on file     Relationship status: Not on file    Intimate partner violence     Fear of current or ex partner: Not on file     Emotionally abused: Not on file     Physically abused: Not on file     Forced sexual activity: Not on file   Other Topics Concern    Not on file   Social History Narrative    Not on file       Review of Systems:   · Constitutional: there has been no unanticipated weight loss. There's been no change in energy level, sleep pattern, or activity level. · Eyes: No visual changes or diplopia. No scleral icterus. · ENT: No Headaches, hearing loss or vertigo. No mouth sores or sore throat. · Cardiovascular: Reviewed in HPI  · Respiratory: No cough or wheezing, no sputum production. No hematemesis. · Gastrointestinal: No abdominal pain, appetite loss, blood in stools. No change in bowel or bladder habits. · Genitourinary: No dysuria, trouble voiding, or hematuria.   · Musculoskeletal: coordination  · No abnormalities of mood, affect, memory, mentation, or behavior are noted    Echo: 10/13/20  The left ventricular systolic function is severely reduced with an ejection   fraction of 20 - 25 %. There is hypokinesis of the apex, apical lateral, apical septum, anterior,   inferior, anteroseptum and apical anterior walls. Left ventricular cavity size is mildly dilated. Left ventricular diastolic filling pressure is elevated. Moderate mitral regurgitation. Mild Bi-atrial enlargement. The right ventricle is mildly enlarged. Right ventricular systolic function is mildly reduced . Mild tricuspid and pulmonic regurgitation. There is a left pleural effusion. Cardiac cath: 10/14/20  Findings:      LEFT HEART CATH  LM: ostial pinch (MLA by IVUS was >30)  LAD: luminals, luminals calcification  Ramus: luminals  LCX: Luminals  RCA: dominant, sluggish flow. KEVIN-2 flow. LVEDP: 15  LVEF: <20%, severe global hypokinesis. No MR, No AS        RIGHT HEART CATH  RA: 6   RV: 33/6   PA: 33/15   and mean of  22  PCWP: 15     Sats: on RA  Ao: 89%  RA:  59%  PA: 61%     CO/CI: 4.5/2.2 (stephanie)  SVR/PVR: 1272/122        Assessment  1. Severe nonischemic cardiomyopathy. LVEF less than 20%                -We will start secondary work-up                - will consider LifeVest  2. No significant CAD. Luminal calcification  3.  Relatively normal mildly elevated intracardiac filling pressures. MRI card: 12/4/20  IMPRESSION: Findings are most consistent with non-ischemic cardiomyopathy concerning for inflammatory or infiltrative disease with biventricular involvement. Differential includes subacute myocarditis or an infiltrative process such as cardiac sarcoidosis. Recommend FDG PET for further evaluation for sarcoidosis. There is four chamber dilation with severely reduced biventricular systolic function. There is evidence of decompensated heart failure.      PET: 12/11/20  IMPRESSION: 1. Abnormal FDG uptake in the lateral wall of the left ventricle, and the right atrium consistent with active myocardial inflammation. 2. Severely impaired left ventricular function with severe global hypokinesis. 3. Normal resting left ventricular perfusion. 4. Moderate bilateral pleural effusion. CT chest: 12/15/20  FINDINGS: Mediastinum: The heart is enlarged with a small pericardial effusion. Coronary artery calcifications are a marker of atherosclerosis. Scattered mediastinal lymph nodes are not pathologic by CT criteria. The lizbeth not be evaluated in the absence of intravenous contrast.  Lungs/pleura: The tracheobronchial tree is patent. There is no pneumothorax. There small to moderate bilateral pleural effusions with atelectasis, right greater than left. There is biapical scarring. There is mild bilateral interstitial thickening within the bases likely due to interstitial edema. Upper Abdomen: Images of the upper abdomen are unremarkable. Soft Tissues/Bones: Degenerative changes involve the thoracic spine. Labs were reviewed including labs from other hospital systems through [facility]. Cardiac testing was reviewed including echos, nuclear scans, cardiac catheterization, including from other hospital systems through [facility]. Assessment:    1. Systolic CHF, chronic ([address]    2. Non-ischemic cardiomyopathy ([address]    3. SOB (shortness of breath)    4. Essential hypertension    5. Left bundle branch block    6. Sarcoid          Plan:  1. Start spironolactone 25 mg on M,W,F  2. Take lasix 20 mg M,W,F and 40 mg all other days  3. CBC, BMP and BNP in 2 weeks  4. Follow up on 01/19/21 at 11:45  5. His blood glucose is elevated. Since it seems that he will remain on prednisone for a long time, I will send message to Dr. Trudy Roberts to see if she will menage this. 6.  Would like to reduce his prednisone.   Will wait until he follows up with Dr. Lauren Murray to reduce prednisone. Scribe's attestation: This note was scribed in the presence of Ethel Christianson M.D. By Doreen Powell RN     The scribe's documentation has been prepared under my direction and personally reviewed by me in its entirety. I confirm that the note above accurately reflects all work, treatment, procedures, and medical decision making performed by me. Spent 50 minutes with patient reviewing chart, examining patient, and developing plan of care. I have spent  40  minutes of face to face time with the patient with more than 50%  spent  counseling and coordinating care for sarcoidosis, heart failure, and plan for follow up echo. I appreciate the opportunity of cooperating in the care of this patient.     Ethel Christianson M.D., [facility] [place]

## 2020-12-31 ENCOUNTER — HOSPITAL ENCOUNTER (OUTPATIENT)
Age: 55
Discharge: HOME | End: 2020-12-31
Payer: COMMERCIAL

## 2020-12-31 LAB
PARATHYROID HORMONE INTACT: 35.4 PG/ML (ref 14–72)
VITAMIN D 25-HYDROXY: 36.8 NG/ML

## 2020-12-31 PROCEDURE — 82306 VITAMIN D 25 HYDROXY: CPT

## 2020-12-31 PROCEDURE — 36415 COLL VENOUS BLD VENIPUNCTURE: CPT

## 2020-12-31 PROCEDURE — 83970 ASSAY OF PARATHORMONE: CPT

## 2020-12-31 PROCEDURE — 82652 VIT D 1 25-DIHYDROXY: CPT

## 2020-12-31 RX ORDER — METOPROLOL SUCCINATE 50 MG/1
50 TABLET, EXTENDED RELEASE ORAL DAILY
Qty: 30 TABLET | Refills: 5 | Status: SHIPPED | OUTPATIENT
Start: 2020-12-31 | End: 2021-07-06

## 2021-01-02 LAB — VITAMIN D 1,25-DIHYDROXY: 42.9 PG/ML (ref 19.9–79.3)

## 2021-01-04 ENCOUNTER — TELEPHONE (OUTPATIENT)
Dept: CARDIOLOGY CLINIC | Age: 56
End: 2021-01-04

## 2021-01-04 NOTE — TELEPHONE ENCOUNTER
----- Message from Carleen Segal MD sent at 1/1/2021  3:27 PM EST -----  Let patient know their lab tests are stable, continue current meds, no new orders or changes at this time. Thanks.

## 2021-01-05 ENCOUNTER — OFFICE VISIT (OUTPATIENT)
Dept: CARDIOLOGY CLINIC | Age: 56
End: 2021-01-05
Payer: COMMERCIAL

## 2021-01-05 ENCOUNTER — TELEPHONE (OUTPATIENT)
Dept: INTERNAL MEDICINE CLINIC | Age: 56
End: 2021-01-05

## 2021-01-05 VITALS
WEIGHT: 179.8 LBS | OXYGEN SATURATION: 98 % | SYSTOLIC BLOOD PRESSURE: 102 MMHG | HEIGHT: 70 IN | HEART RATE: 63 BPM | BODY MASS INDEX: 25.74 KG/M2 | DIASTOLIC BLOOD PRESSURE: 60 MMHG

## 2021-01-05 DIAGNOSIS — I50.22 SYSTOLIC CHF, CHRONIC (HCC): Primary | ICD-10-CM

## 2021-01-05 DIAGNOSIS — I10 ESSENTIAL HYPERTENSION: ICD-10-CM

## 2021-01-05 DIAGNOSIS — E11.9 TYPE 2 DIABETES MELLITUS WITHOUT COMPLICATION, WITHOUT LONG-TERM CURRENT USE OF INSULIN (HCC): Primary | ICD-10-CM

## 2021-01-05 DIAGNOSIS — R06.02 SOB (SHORTNESS OF BREATH): ICD-10-CM

## 2021-01-05 DIAGNOSIS — I44.7 LEFT BUNDLE BRANCH BLOCK: ICD-10-CM

## 2021-01-05 DIAGNOSIS — I42.8 NON-ISCHEMIC CARDIOMYOPATHY (HCC): ICD-10-CM

## 2021-01-05 DIAGNOSIS — D86.9 SARCOID: ICD-10-CM

## 2021-01-05 PROCEDURE — 99215 OFFICE O/P EST HI 40 MIN: CPT | Performed by: INTERNAL MEDICINE

## 2021-01-05 RX ORDER — FOLIC ACID 1 MG/1
1 TABLET ORAL DAILY
COMMUNITY

## 2021-01-05 RX ORDER — PREDNISONE 20 MG/1
40 TABLET ORAL DAILY
Qty: 60 TABLET | Refills: 5 | Status: SHIPPED | OUTPATIENT
Start: 2021-01-05 | End: 2021-02-04

## 2021-01-05 RX ORDER — SACUBITRIL AND VALSARTAN 49; 51 MG/1; MG/1
1 TABLET, FILM COATED ORAL 2 TIMES DAILY
Qty: 60 TABLET | Refills: 5 | Status: SHIPPED
Start: 2021-01-05 | End: 2021-03-30 | Stop reason: DRUGHIGH

## 2021-01-05 RX ORDER — M-VIT,TX,IRON,MINS/CALC/FOLIC 27MG-0.4MG
1 TABLET ORAL DAILY
COMMUNITY

## 2021-01-05 RX ORDER — SPIRONOLACTONE 25 MG/1
TABLET ORAL
Qty: 30 TABLET | Refills: 5 | Status: SHIPPED | OUTPATIENT
Start: 2021-01-05 | End: 2022-02-14 | Stop reason: SDUPTHER

## 2021-01-05 NOTE — LETTER
He reported he was sleeping when he experienced VT on 12/13/2020. He had a 10.5 second episode of monomorphic VT with a 360 msec cycle length at 1:24PM. He states he woke up and his Lifevest showed an orange screen that said \"patient respond\", he pushed the button and went back to sleep. Today he reports DR Perri Resendiz said he is 70% positive for sarcoidosis. He has been taking prednisone 40 mg daily. DR Perri Resendiz started methotrexate. He reports he has not had any more alarms from his Life Vest. He is tolerating the entresto and digoxin well. He is taking lasix 40 mg daily. He reports he has urinary urgency in the first few hours after taking lasix. His SOB has improved. He denies CP, palpitations, dizziness or syncope. He reports he feels better and has more energy. His weight is down almost 20 pounds. His glucose is elevated. No Known Allergies  Current Outpatient Medications   Medication Sig Dispense Refill    methotrexate (RHEUMATREX) 2.5 MG chemo tablet Take by mouth every other day      folic acid (FOLVITE) 1 MG tablet Take 1 mg by mouth daily      Multiple Vitamins-Minerals (THERAPEUTIC MULTIVITAMIN-MINERALS) tablet Take 1 tablet by mouth daily      metoprolol succinate (TOPROL XL) 50 MG extended release tablet Take 1 tablet by mouth daily 30 tablet 5    sacubitril-valsartan (ENTRESTO) 49-51 MG per tablet Take 1 tablet by mouth 2 times daily (Patient taking differently: Take 0.5 tablets by mouth 2 times daily ) 60 tablet 0    digoxin (LANOXIN) 125 MCG tablet Take 1 tablet by mouth daily 30 tablet 11    predniSONE (DELTASONE) 20 MG tablet Take 2 tablets by mouth daily 60 tablet 1    furosemide (LASIX) 40 MG tablet Take 1 tablet by mouth daily 30 tablet 5    aspirin 81 MG chewable tablet Take 1 tablet by mouth daily 30 tablet 3     No current facility-administered medications for this visit. History reviewed. No pertinent past medical history. History reviewed. No pertinent surgical history. Family History   Problem Relation Age of Onset    Cancer Mother         skin     Cancer Father         skin, prostate     Cancer Sister         skin, Melanoma     Social History     Socioeconomic History    Marital status:      Spouse name: Not on file    Number of children: Not on file    Years of education: Not on file    Highest education level: Not on file   Occupational History    Not on file   Social Needs    Financial resource strain: Not on file    Food insecurity     Worry: Not on file     Inability: Not on file    Transportation needs     Medical: Not on file     Non-medical: Not on file   Tobacco Use    Smoking status: Never Smoker    Smokeless tobacco: Never Used   Substance and Sexual Activity    Alcohol use: Yes     Comment: rarely    Drug use: No    Sexual activity: Not on file   Lifestyle    Physical activity     Days per week: Not on file     Minutes per session: Not on file    Stress: Not on file   Relationships    Social connections     Talks on phone: Not on file     Gets together: Not on file     Attends [employer] service: Not on file     Active member of club or organization: Not on file     Attends meetings of clubs or organizations: Not on file     Relationship status: Not on file    Intimate partner violence     Fear of current or ex partner: Not on file     Emotionally abused: Not on file     Physically abused: Not on file     Forced sexual activity: Not on file   Other Topics Concern    Not on file   Social History Narrative    Not on file       Review of Systems:   · Constitutional: there has been no unanticipated weight loss. There's been no change in energy level, sleep pattern, or activity level. · Eyes: No visual changes or diplopia. No scleral icterus. · ENT: No Headaches, hearing loss or vertigo. No mouth sores or sore throat.   · Cardiovascular: Reviewed in HPI · Respiratory: No cough or wheezing, no sputum production. No hematemesis. · Gastrointestinal: No abdominal pain, appetite loss, blood in stools. No change in bowel or bladder habits. · Genitourinary: No dysuria, trouble voiding, or hematuria. · Musculoskeletal:  No gait disturbance, weakness or joint complaints. · Integumentary: No rash or pruritis. · Neurological: No headache, diplopia, change in muscle strength, numbness or tingling. No change in gait, balance, coordination, mood, affect, memory, mentation, behavior. · Psychiatric: No anxiety, no depression. · Endocrine: No malaise, fatigue or temperature intolerance. No excessive thirst, fluid intake, or urination. No tremor. · Hematologic/Lymphatic: No abnormal bruising or bleeding, blood clots or swollen lymph nodes. · Allergic/Immunologic: No nasal congestion or hives. Physical Examination:    Vitals:    01/05/21 1250   BP: 102/60   Pulse: 63   SpO2: 98%   Weight: 179 lb 12.8 oz (81.6 kg)   Height: 5' 10\" (1.778 m)     Body mass index is 25.8 kg/m². Wt Readings from Last 3 Encounters:   01/05/21 179 lb 12.8 oz (81.6 kg)   12/23/20 200 lb (90.7 kg)   12/22/20 200 lb (90.7 kg)     BP Readings from Last 3 Encounters:   01/05/21 102/60   12/23/20 120/60   12/22/20 102/70            Constitutional and General Appearance:   WD/WN in NAD  HEENT:  NC/AT  TAMIE  No problems with hearing  Skin:  Warm, dry  Respiratory:  · Normal excursion and expansion without use of accessory muscles  · Resp Auscultation: Normal breath sounds without dullness  Cardiovascular:  · The apical impulses not displaced  · Heart tones are crisp and normal  · Cervical veins are not engorged  · The carotid upstroke is normal in amplitude and contour without delay or bruit  · JVP less than 8 cm H2O  RRR with nl S1 and S2 without m,r,g  · Peripheral pulses are symmetrical and full  · There is no clubbing, cyanosis of the extremities.   · No edema · Femoral Arteries: 2+ and equal  · Pedal Pulses: 2+ and equal   Neck:  · No thyromegaly  Abdomen:  · No masses or tenderness  · Liver/Spleen: No Abnormalities Noted  Neurological/Psychiatric:  · Alert and oriented in all spheres  · Moves all extremities well  · Exhibits normal gait balance and coordination  · No abnormalities of mood, affect, memory, mentation, or behavior are noted    Echo: 10/13/20  The left ventricular systolic function is severely reduced with an ejection   fraction of 20 - 25 %. There is hypokinesis of the apex, apical lateral, apical septum, anterior,   inferior, anteroseptum and apical anterior walls. Left ventricular cavity size is mildly dilated. Left ventricular diastolic filling pressure is elevated. Moderate mitral regurgitation. Mild Bi-atrial enlargement. The right ventricle is mildly enlarged. Right ventricular systolic function is mildly reduced . Mild tricuspid and pulmonic regurgitation. There is a left pleural effusion. Cardiac cath: 10/14/20  Findings:      LEFT HEART CATH  LM: ostial pinch (MLA by IVUS was >30)  LAD: luminals, luminals calcification  Ramus: luminals  LCX: Luminals  RCA: dominant, sluggish flow. KEVIN-2 flow. LVEDP: 15  LVEF: <20%, severe global hypokinesis. No MR, No AS        RIGHT HEART CATH  RA: 6   RV: 33/6   PA: 33/15   and mean of  22  PCWP: 15     Sats: on RA  Ao: 89%  RA:  59%  PA: 61%     CO/CI: 4.5/2.2 (stephanie)  SVR/PVR: 1272/122        Assessment  1. Severe nonischemic cardiomyopathy. LVEF less than 20%                -We will start secondary work-up                - will consider LifeVest  2. No significant CAD. Luminal calcification  3.  Relatively normal mildly elevated intracardiac filling pressures.        MRI card: 12/4/20 IMPRESSION: Findings are most consistent with non-ischemic cardiomyopathy concerning for inflammatory or infiltrative disease with biventricular involvement. Differential includes subacute myocarditis or an infiltrative process such as cardiac sarcoidosis. Recommend FDG PET for further evaluation for sarcoidosis. There is four chamber dilation with severely reduced biventricular systolic function. There is evidence of decompensated heart failure. PET: 12/11/20  IMPRESSION: 1. Abnormal FDG uptake in the lateral wall of the left ventricle, and the right atrium consistent with active myocardial inflammation. 2. Severely impaired left ventricular function with severe global hypokinesis. 3. Normal resting left ventricular perfusion. 4. Moderate bilateral pleural effusion. CT chest: 12/15/20  FINDINGS: Mediastinum: The heart is enlarged with a small pericardial effusion. Coronary artery calcifications are a marker of atherosclerosis. Scattered mediastinal lymph nodes are not pathologic by CT criteria. The lizbeth not be evaluated in the absence of intravenous contrast.  Lungs/pleura: The tracheobronchial tree is patent. There is no pneumothorax. There small to moderate bilateral pleural effusions with atelectasis, right greater than left. There is biapical scarring. There is mild bilateral interstitial thickening within the bases likely due to interstitial edema. Upper Abdomen: Images of the upper abdomen are unremarkable. Soft Tissues/Bones: Degenerative changes involve the thoracic spine. Labs were reviewed including labs from other hospital systems through [facility]. Cardiac testing was reviewed including echos, nuclear scans, cardiac catheterization, including from other hospital systems through [facility]. Assessment:    1. Systolic CHF, chronic ([address]    2. Non-ischemic cardiomyopathy ([address]    3. SOB (shortness of breath)    4.  Essential hypertension 5. Left bundle branch block    6. Sarcoid          Plan:  1. Start spironolactone 25 mg on M,W,F  2. Take lasix 20 mg M,W,F and 40 mg all other days  3. CBC, BMP and BNP in 2 weeks  4. Follow up on 01/19/21 at 11:45  5. His blood glucose is elevated. Since it seems that he will remain on prednisone for a long time, I will send message to Dr. Jaime Abad to see if she will menage this. 6.  Would like to reduce his prednisone. Will wait until he follows up with Dr. Destin Chaudhry to reduce prednisone. Scribe's attestation: This note was scribed in the presence of Ray Aponte M.D. By Keny Cee RN     The scribe's documentation has been prepared under my direction and personally reviewed by me in its entirety. I confirm that the note above accurately reflects all work, treatment, procedures, and medical decision making performed by me. Spent 50 minutes with patient reviewing chart, examining patient, and developing plan of care. I have spent  40  minutes of face to face time with the patient with more than 50%  spent  counseling and coordinating care for sarcoidosis, heart failure, and plan for follow up echo. I appreciate the opportunity of cooperating in the care of this patient.     Ray Aponte M.D., [facility] [place]

## 2021-01-05 NOTE — PATIENT INSTRUCTIONS
Plan:  1. Start spironolactone 25 mg on M,W,F  2. Take lasix 20 mg M,W,F and 40 mg all other days  3. CBC, BMP and BNP in 2 weeks  4.  Follow up on 01/19/21 at 11:45

## 2021-01-05 NOTE — TELEPHONE ENCOUNTER
----- Message from Antonio Gray MD sent at 1/5/2021  1:47 PM EST -----  Regarding: FW: A mutual patient  Please have pt call with blood sugar readings  ----- Message -----  From: Rodriguez Velazquez MD  Sent: 1/5/2021   1:27 PM EST  To: Antonio Gray MD  Subject: A mutual patient                                 Clay Adamson,    Our mutual patient has been diagnosed with sarcoidosis of the heart with cardiomyopathy. He is on 40 mg of prednisone with methotrexate, and will be on prednisone for a long time. As such, his glucose has increased. I was hoping you could handle his elevated glucoses, especially since he is going to be on this for a long time.     Thank you,    Livier Gunter

## 2021-01-06 RX ORDER — BLOOD-GLUCOSE METER
1 KIT MISCELLANEOUS DAILY
Qty: 1 KIT | Refills: 0 | Status: SHIPPED | OUTPATIENT
Start: 2021-01-06

## 2021-01-18 ENCOUNTER — HOSPITAL ENCOUNTER (OUTPATIENT)
Age: 56
Discharge: HOME | End: 2021-01-18
Payer: COMMERCIAL

## 2021-01-18 DIAGNOSIS — I50.22 SYSTOLIC CHF, CHRONIC (HCC): ICD-10-CM

## 2021-01-18 LAB
ANION GAP SERPL CALCULATED.3IONS-SCNC: 10 MMOL/L (ref 3–16)
BASOPHILS ABSOLUTE: 0 K/UL (ref 0–0.2)
BASOPHILS RELATIVE PERCENT: 0.1 %
BUN BLDV-MCNC: 30 MG/DL (ref 7–20)
CALCIUM SERPL-MCNC: 9.5 MG/DL (ref 8.3–10.6)
CHLORIDE BLD-SCNC: 99 MMOL/L (ref 99–110)
CO2: 29 MMOL/L (ref 21–32)
CREAT SERPL-MCNC: 0.9 MG/DL (ref 0.9–1.3)
EOSINOPHILS ABSOLUTE: 0 K/UL (ref 0–0.6)
EOSINOPHILS RELATIVE PERCENT: 0.2 %
GFR AFRICAN AMERICAN: >60
GFR NON-AFRICAN AMERICAN: >60
GLUCOSE BLD-MCNC: 118 MG/DL (ref 70–99)
HCT VFR BLD CALC: 47.1 % (ref 40.5–52.5)
HEMOGLOBIN: 15.4 G/DL (ref 13.5–17.5)
LYMPHOCYTES ABSOLUTE: 1 K/UL (ref 1–5.1)
LYMPHOCYTES RELATIVE PERCENT: 8.5 %
MCH RBC QN AUTO: 27.6 PG (ref 26–34)
MCHC RBC AUTO-ENTMCNC: 32.6 G/DL (ref 31–36)
MCV RBC AUTO: 84.5 FL (ref 80–100)
MONOCYTES ABSOLUTE: 0.6 K/UL (ref 0–1.3)
MONOCYTES RELATIVE PERCENT: 5.2 %
NEUTROPHILS ABSOLUTE: 10.1 K/UL (ref 1.7–7.7)
NEUTROPHILS RELATIVE PERCENT: 86 %
PDW BLD-RTO: 15.5 % (ref 12.4–15.4)
PLATELET # BLD: 200 K/UL (ref 135–450)
PMV BLD AUTO: 8.4 FL (ref 5–10.5)
POTASSIUM SERPL-SCNC: 4.5 MMOL/L (ref 3.5–5.1)
RBC # BLD: 5.58 M/UL (ref 4.2–5.9)
SODIUM BLD-SCNC: 138 MMOL/L (ref 136–145)
WBC # BLD: 11.7 K/UL (ref 4–11)

## 2021-01-18 PROCEDURE — 36415 COLL VENOUS BLD VENIPUNCTURE: CPT

## 2021-01-18 PROCEDURE — 80048 BASIC METABOLIC PNL TOTAL CA: CPT

## 2021-01-18 PROCEDURE — 83880 ASSAY OF NATRIURETIC PEPTIDE: CPT

## 2021-01-18 PROCEDURE — 85025 COMPLETE CBC W/AUTO DIFF WBC: CPT

## 2021-01-18 NOTE — PROGRESS NOTES
[address]  Advanced CHF/Pulmonary Hypertension   Cardiac Evaluation      Bereket Sandra  YOB: 1965    Date of Visit:  1/19/21      Chief Complaint   Patient presents with    Follow-up    Congestive Heart Failure         History of Present Illness: Matthew Rey is a 60 y.o. male who presents from referral from Dr Ghislaine Kelley for consultation and management of advanced heart failure. He presented to hospital on 10/12/20 with complaints of severe SOB, BLE edema and chest pain. Echo from 10/13/20 demonstrated EF 20-25%, moderate biatrial enlargement, mild TR and HI. His LHC/RHC 10/14/20 demonstrated severe nonischemic cardiomyopathy, no significant CAD. His cardiac MRI from 12/04/20 showed NICM concerning for inflammatory or infiltrative disease with biventricular involvement. His PET scan from 12/11/20 showed abnormal FDG uptake in the lateral wall of the left ventricle and the right atrium consistent with active myocardial inflammation. Severe global HK and severely impaired LV function at 17.6%. His CT chest from 12/15/20 showed the heart is enlarged with a small pericardial effusion. He is going to see  pulmonology soon. Dr Ghislaine Kelley has not had genetic testing yet but [facility] He reports he feels well overall. He has SOB on exertion and BLE edema which has been slowly improving. He reports he had increased fatigue for the last 1-2 years. He denies CP, palpitations, dizziness or syncope. He takes little naps during the day. He has been sleeping in his chair. He reports he was waking up feeling smothery. He has not slept in a bed since 09/2020. At his OV on 12/22/20, his lisinopril was stopped. He was started on Entresto 1/2 tab of 49/51 mg twice daily and digoxin 125 mcg daily. He reported he was sleeping when he experienced VT on 12/13/2020. He had a 10.5 second episode of monomorphic VT with a 360 msec cycle length at 1:24PM. He states he woke up and his Lifevest showed an orange screen that said \"patient respond\", he pushed the button and went back to sleep. On 01/05/21 he reported DR Milind Lo said he was 70% positive for sarcoidosis. He has been taking prednisone 40 mg daily. DR Milind Lo started methotrexate. He reported he did not have any more alarms from his Life Vest. He is tolerating the entresto and digoxin well. He reports he feels better and has more energy. His weight is down almost 20 pounds. His glucose is elevated. He was started on spironolactone 25 mg M,W,F and his lasix was adjusted to 20 mg on M,W,F and 40 mg all other days at this OV. Today he reports he is still on the methotrexate and prednisone. He is scheduled to see Dr Milind Lo on 02/10/21. He reports his BLE edema has resolved on the current dosing lasix. He reports he has mild SOB. He has been feeling better each day. He denies CP, palpitations, dizziness or syncope.  He denies shocks from his Life Vest.         No Known Allergies  Current Outpatient Medications   Medication Sig Dispense Refill    methotrexate (RHEUMATREX) 2.5 MG chemo tablet Take by mouth every other day      folic acid (FOLVITE) 1 MG tablet Take 1 mg by mouth daily      Multiple Vitamins-Minerals (THERAPEUTIC MULTIVITAMIN-MINERALS) tablet Take 1 tablet by mouth daily      spironolactone (ALDACTONE) 25 MG tablet 25 mg on M,W,F 30 tablet 5    predniSONE (DELTASONE) 20 MG tablet Take 2 tablets by mouth daily 60 tablet 5    sacubitril-valsartan (ENTRESTO) 49-51 MG per tablet Take 1 tablet by mouth 2 times daily (Patient taking differently: Take 0.5 tablets by mouth 2 times daily ) 60 tablet 5    metoprolol succinate (TOPROL XL) 50 MG extended release tablet Take 1 tablet by mouth daily 30 tablet 5  digoxin (LANOXIN) 125 MCG tablet Take 1 tablet by mouth daily 30 tablet 11    furosemide (LASIX) 40 MG tablet Take 1 tablet by mouth daily 30 tablet 5    aspirin 81 MG chewable tablet Take 1 tablet by mouth daily 30 tablet 3    Blood Glucose Monitoring Suppl ([employer] BLOOD GLUCOSE KIT) KIT 1 each by Does not apply route daily 1 kit 0     No current facility-administered medications for this visit. History reviewed. No pertinent past medical history. History reviewed. No pertinent surgical history.   Family History   Problem Relation Age of Onset    Cancer Mother         skin     Cancer Father         skin, prostate     Cancer Sister         skin, Melanoma     Social History     Socioeconomic History    Marital status:      Spouse name: Not on file    Number of children: Not on file    Years of education: Not on file    Highest education level: Not on file   Occupational History    Not on file   Social Needs    Financial resource strain: Not on file    Food insecurity     Worry: Not on file     Inability: Not on file    Transportation needs     Medical: Not on file     Non-medical: Not on file   Tobacco Use    Smoking status: Never Smoker    Smokeless tobacco: Never Used   Substance and Sexual Activity    Alcohol use: Yes     Comment: rarely    Drug use: No    Sexual activity: Not on file   Lifestyle    Physical activity     Days per week: Not on file     Minutes per session: Not on file    Stress: Not on file   Relationships    Social connections     Talks on phone: Not on file     Gets together: Not on file     Attends Latter-day service: Not on file     Active member of club or organization: Not on file     Attends meetings of clubs or organizations: Not on file     Relationship status: Not on file    Intimate partner violence     Fear of current or ex partner: Not on file     Emotionally abused: Not on file     Physically abused: Not on file Forced sexual activity: Not on file   Other Topics Concern    Not on file   Social History Narrative    Not on file       Review of Systems:   · Constitutional: there has been no unanticipated weight loss. There's been no change in energy level, sleep pattern, or activity level. · Eyes: No visual changes or diplopia. No scleral icterus. · ENT: No Headaches, hearing loss or vertigo. No mouth sores or sore throat. · Cardiovascular: Reviewed in HPI  · Respiratory: No cough or wheezing, no sputum production. No hematemesis. · Gastrointestinal: No abdominal pain, appetite loss, blood in stools. No change in bowel or bladder habits. · Genitourinary: No dysuria, trouble voiding, or hematuria. · Musculoskeletal:  No gait disturbance, weakness or joint complaints. · Integumentary: No rash or pruritis. · Neurological: No headache, diplopia, change in muscle strength, numbness or tingling. No change in gait, balance, coordination, mood, affect, memory, mentation, behavior. · Psychiatric: No anxiety, no depression. · Endocrine: No malaise, fatigue or temperature intolerance. No excessive thirst, fluid intake, or urination. No tremor. · Hematologic/Lymphatic: No abnormal bruising or bleeding, blood clots or swollen lymph nodes. · Allergic/Immunologic: No nasal congestion or hives. Physical Examination:    Vitals:    01/19/21 1159   BP: 110/72   Pulse: 73   SpO2: 98%   Weight: 178 lb (80.7 kg)   Height: 5' 10\" (1.778 m)     Body mass index is 25.54 kg/m².      Wt Readings from Last 3 Encounters:   01/19/21 178 lb (80.7 kg)   01/05/21 179 lb 12.8 oz (81.6 kg)   12/23/20 200 lb (90.7 kg)     BP Readings from Last 3 Encounters:   01/19/21 110/72   01/05/21 102/60   12/23/20 120/60            Constitutional and General Appearance:   WD/WN in NAD  HEENT:  NC/AT  TAMIE  No problems with hearing  Skin:  Warm, dry  Respiratory:  · Normal excursion and expansion without use of accessory muscles · Resp Auscultation: Normal breath sounds without dullness  Cardiovascular:  · The apical impulses not displaced  · Heart tones are crisp and normal  · Cervical veins are not engorged  · The carotid upstroke is normal in amplitude and contour without delay or bruit  · JVP less than 8 cm H2O  RRR with nl S1 and S2 without m,r,g  · Peripheral pulses are symmetrical and full  · There is no clubbing, cyanosis of the extremities. · No edema  · Femoral Arteries: 2+ and equal  · Pedal Pulses: 2+ and equal   Neck:  · No thyromegaly  Abdomen:  · No masses or tenderness  · Liver/Spleen: No Abnormalities Noted  Neurological/Psychiatric:  · Alert and oriented in all spheres  · Moves all extremities well  · Exhibits normal gait balance and coordination  · No abnormalities of mood, affect, memory, mentation, or behavior are noted    Echo: 10/13/20  The left ventricular systolic function is severely reduced with an ejection   fraction of 20 - 25 %. There is hypokinesis of the apex, apical lateral, apical septum, anterior,   inferior, anteroseptum and apical anterior walls. Left ventricular cavity size is mildly dilated. Left ventricular diastolic filling pressure is elevated. Moderate mitral regurgitation. Mild Bi-atrial enlargement. The right ventricle is mildly enlarged. Right ventricular systolic function is mildly reduced . Mild tricuspid and pulmonic regurgitation. There is a left pleural effusion. Cardiac cath: 10/14/20  Findings:      LEFT HEART CATH  LM: ostial pinch (MLA by IVUS was >30)  LAD: luminals, luminals calcification  Ramus: luminals  LCX: Luminals  RCA: dominant, sluggish flow. KEVIN-2 flow. LVEDP: 15  LVEF: <20%, severe global hypokinesis.                 No MR, No AS        RIGHT HEART CATH  RA: 6   RV: 33/6   PA: 33/15   and mean of  22  PCWP: 15     Sats: on RA  Ao: 89%  RA:  59%  PA: 61%     CO/CI: 4.5/2.2 (stephanie)  SVR/PVR: 1272/122        Assessment 1.  Severe nonischemic cardiomyopathy. LVEF less than 20%                -We will start secondary work-up                - will consider LifeVest  2. No significant CAD. Luminal calcification  3.  Relatively normal mildly elevated intracardiac filling pressures. MRI card: 12/4/20  IMPRESSION: Findings are most consistent with non-ischemic cardiomyopathy concerning for inflammatory or infiltrative disease with biventricular involvement. Differential includes subacute myocarditis or an infiltrative process such as cardiac sarcoidosis. Recommend FDG PET for further evaluation for sarcoidosis. There is four chamber dilation with severely reduced biventricular systolic function. There is evidence of decompensated heart failure. PET: 12/11/20  IMPRESSION: 1. Abnormal FDG uptake in the lateral wall of the left ventricle, and the right atrium consistent with active myocardial inflammation. 2. Severely impaired left ventricular function with severe global hypokinesis. 3. Normal resting left ventricular perfusion. 4. Moderate bilateral pleural effusion. CT chest: 12/15/20  FINDINGS: Mediastinum: The heart is enlarged with a small pericardial effusion. Coronary artery calcifications are a marker of atherosclerosis. Scattered mediastinal lymph nodes are not pathologic by CT criteria. The lizbeth not be evaluated in the absence of intravenous contrast.  Lungs/pleura: The tracheobronchial tree is patent. There is no pneumothorax. There small to moderate bilateral pleural effusions with atelectasis, right greater than left. There is biapical scarring. There is mild bilateral interstitial thickening within the bases likely due to interstitial edema. Upper Abdomen: Images of the upper abdomen are unremarkable. Soft Tissues/Bones: Degenerative changes involve the thoracic spine. Labs were reviewed including labs from other hospital systems through [facility]. Cardiac testing was reviewed including echos, nuclear scans, cardiac catheterization, including from other hospital systems through [facility]. Assessment:    1. Acute systolic heart failure ([facility] [address]    2. Essential hypertension    3. Left bundle branch block    4. Non-ischemic cardiomyopathy ([facility] [address]            Plan:  1. Discussed possible MUGA scan. His repeat echo suggests EF either 25% (read) or 41%(measured). 2. Decrease lasix to 40 mg M,F and 20 mg all other days  3. BMP and BNP in 1 month  4. Follow up in 1 month        Scribe's attestation: This note was scribed in the presence of Jovana Rosario M.D. By Kalee Hodges RN     The scribe's documentation has been prepared under my direction and personally reviewed by me in its entirety. I confirm that the note above accurately reflects all work, treatment, procedures, and medical decision making performed by me. Time Based Itemization  A total of 30 minutes was spent on today's patient encounter. If applicable, non-patient-facing activities:  ( x)Preparing to see the patient and reviewing records  ( ) Individual interpretation of results  ( ) Discussion or coordination of care with other health care professionals  ( x) Ordering of unique tests, medications, or procedures  ( x) Documentation within the EHR      I appreciate the opportunity of cooperating in the care of this patient.     Jovana Rosario M.D., [address] [facility]

## 2021-01-19 ENCOUNTER — TELEPHONE (OUTPATIENT)
Dept: CARDIOLOGY CLINIC | Age: 56
End: 2021-01-19

## 2021-01-19 ENCOUNTER — HOSPITAL ENCOUNTER (OUTPATIENT)
Dept: CARDIOLOGY | Age: 56
Discharge: HOME | End: 2021-01-19
Payer: COMMERCIAL

## 2021-01-19 ENCOUNTER — OFFICE VISIT (OUTPATIENT)
Dept: CARDIOLOGY CLINIC | Age: 56
End: 2021-01-19
Payer: COMMERCIAL

## 2021-01-19 VITALS
BODY MASS INDEX: 25.48 KG/M2 | HEART RATE: 73 BPM | WEIGHT: 178 LBS | SYSTOLIC BLOOD PRESSURE: 110 MMHG | DIASTOLIC BLOOD PRESSURE: 72 MMHG | OXYGEN SATURATION: 98 % | HEIGHT: 70 IN

## 2021-01-19 DIAGNOSIS — I44.7 LEFT BUNDLE BRANCH BLOCK: ICD-10-CM

## 2021-01-19 DIAGNOSIS — I47.29 NSVT (NONSUSTAINED VENTRICULAR TACHYCARDIA): ICD-10-CM

## 2021-01-19 DIAGNOSIS — I50.21 ACUTE SYSTOLIC HEART FAILURE (HCC): Primary | ICD-10-CM

## 2021-01-19 DIAGNOSIS — R06.02 SOB (SHORTNESS OF BREATH): ICD-10-CM

## 2021-01-19 DIAGNOSIS — I42.8 NON-ISCHEMIC CARDIOMYOPATHY (HCC): ICD-10-CM

## 2021-01-19 DIAGNOSIS — I10 ESSENTIAL HYPERTENSION: ICD-10-CM

## 2021-01-19 LAB — PRO-BNP: 767 PG/ML (ref 0–124)

## 2021-01-19 PROCEDURE — 99214 OFFICE O/P EST MOD 30 MIN: CPT | Performed by: INTERNAL MEDICINE

## 2021-01-19 PROCEDURE — 93308 TTE F-UP OR LMTD: CPT

## 2021-01-19 NOTE — LETTER
He reported he was sleeping when he experienced VT on 12/13/2020. He had a 10.5 second episode of monomorphic VT with a 360 msec cycle length at 1:24PM. He states he woke up and his Lifevest showed an orange screen that said \"patient respond\", he pushed the button and went back to sleep. On 01/05/21 he reported DR Kervin Leigh said he was 70% positive for sarcoidosis. He has been taking prednisone 40 mg daily. DR Kervin Leigh started methotrexate. He reported he did not have any more alarms from his Life Vest. He is tolerating the entresto and digoxin well. He reports he feels better and has more energy. His weight is down almost 20 pounds. His glucose is elevated. He was started on spironolactone 25 mg M,W,F and his lasix was adjusted to 20 mg on M,W,F and 40 mg all other days at this OV. Today he reports he is still on the methotrexate and prednisone. He is scheduled to see Dr Kervin Leigh on 02/10/21. He reports his BLE edema has resolved on the current dosing lasix. He reports he has mild SOB. He has been feeling better each day. He denies CP, palpitations, dizziness or syncope.  He denies shocks from his Life Vest.         No Known Allergies  Current Outpatient Medications   Medication Sig Dispense Refill    methotrexate (RHEUMATREX) 2.5 MG chemo tablet Take by mouth every other day      folic acid (FOLVITE) 1 MG tablet Take 1 mg by mouth daily      Multiple Vitamins-Minerals (THERAPEUTIC MULTIVITAMIN-MINERALS) tablet Take 1 tablet by mouth daily      spironolactone (ALDACTONE) 25 MG tablet 25 mg on M,W,F 30 tablet 5    predniSONE (DELTASONE) 20 MG tablet Take 2 tablets by mouth daily 60 tablet 5    sacubitril-valsartan (ENTRESTO) 49-51 MG per tablet Take 1 tablet by mouth 2 times daily (Patient taking differently: Take 0.5 tablets by mouth 2 times daily ) 60 tablet 5    metoprolol succinate (TOPROL XL) 50 MG extended release tablet Take 1 tablet by mouth daily 30 tablet 5  digoxin (LANOXIN) 125 MCG tablet Take 1 tablet by mouth daily 30 tablet 11    furosemide (LASIX) 40 MG tablet Take 1 tablet by mouth daily 30 tablet 5    aspirin 81 MG chewable tablet Take 1 tablet by mouth daily 30 tablet 3    Blood Glucose Monitoring Suppl ([employer] BLOOD GLUCOSE KIT) KIT 1 each by Does not apply route daily 1 kit 0     No current facility-administered medications for this visit. History reviewed. No pertinent past medical history. History reviewed. No pertinent surgical history.   Family History   Problem Relation Age of Onset    Cancer Mother         skin     Cancer Father         skin, prostate     Cancer Sister         skin, Melanoma     Social History     Socioeconomic History    Marital status:      Spouse name: Not on file    Number of children: Not on file    Years of education: Not on file    Highest education level: Not on file   Occupational History    Not on file   Social Needs    Financial resource strain: Not on file    Food insecurity     Worry: Not on file     Inability: Not on file    Transportation needs     Medical: Not on file     Non-medical: Not on file   Tobacco Use    Smoking status: Never Smoker    Smokeless tobacco: Never Used   Substance and Sexual Activity    Alcohol use: Yes     Comment: rarely    Drug use: No    Sexual activity: Not on file   Lifestyle    Physical activity     Days per week: Not on file     Minutes per session: Not on file    Stress: Not on file   Relationships    Social connections     Talks on phone: Not on file     Gets together: Not on file     Attends Restorationist service: Not on file     Active member of club or organization: Not on file     Attends meetings of clubs or organizations: Not on file     Relationship status: Not on file    Intimate partner violence     Fear of current or ex partner: Not on file     Emotionally abused: Not on file     Physically abused: Not on file Forced sexual activity: Not on file   Other Topics Concern    Not on file   Social History Narrative    Not on file       Review of Systems:   · Constitutional: there has been no unanticipated weight loss. There's been no change in energy level, sleep pattern, or activity level. · Eyes: No visual changes or diplopia. No scleral icterus. · ENT: No Headaches, hearing loss or vertigo. No mouth sores or sore throat. · Cardiovascular: Reviewed in HPI  · Respiratory: No cough or wheezing, no sputum production. No hematemesis. · Gastrointestinal: No abdominal pain, appetite loss, blood in stools. No change in bowel or bladder habits. · Genitourinary: No dysuria, trouble voiding, or hematuria. · Musculoskeletal:  No gait disturbance, weakness or joint complaints. · Integumentary: No rash or pruritis. · Neurological: No headache, diplopia, change in muscle strength, numbness or tingling. No change in gait, balance, coordination, mood, affect, memory, mentation, behavior. · Psychiatric: No anxiety, no depression. · Endocrine: No malaise, fatigue or temperature intolerance. No excessive thirst, fluid intake, or urination. No tremor. · Hematologic/Lymphatic: No abnormal bruising or bleeding, blood clots or swollen lymph nodes. · Allergic/Immunologic: No nasal congestion or hives. Physical Examination:    Vitals:    01/19/21 1159   BP: 110/72   Pulse: 73   SpO2: 98%   Weight: 178 lb (80.7 kg)   Height: 5' 10\" (1.778 m)     Body mass index is 25.54 kg/m².      Wt Readings from Last 3 Encounters:   01/19/21 178 lb (80.7 kg)   01/05/21 179 lb 12.8 oz (81.6 kg)   12/23/20 200 lb (90.7 kg)     BP Readings from Last 3 Encounters:   01/19/21 110/72   01/05/21 102/60   12/23/20 120/60            Constitutional and General Appearance:   WD/WN in NAD  HEENT:  NC/AT  TAMIE  No problems with hearing  Skin:  Warm, dry  Respiratory:  · Normal excursion and expansion without use of accessory muscles Cardiac testing was reviewed including echos, nuclear scans, cardiac catheterization, including from other hospital systems through [facility]. Assessment:    1. Acute systolic heart failure ([facility] [address]    2. Essential hypertension    3. Left bundle branch block    4. Non-ischemic cardiomyopathy ([facility] [address]            Plan:  1. Discussed possible MUGA scan. His repeat echo suggests EF either 25% (read) or 41%(measured). 2. Decrease lasix to 40 mg M,F and 20 mg all other days  3. BMP and BNP in 1 month  4. Follow up in 1 month        Scribe's attestation: This note was scribed in the presence of Jessica Krueger M.D. By Moira Cordero RN     The scribe's documentation has been prepared under my direction and personally reviewed by me in its entirety. I confirm that the note above accurately reflects all work, treatment, procedures, and medical decision making performed by me. Time Based Itemization  A total of 30 minutes was spent on today's patient encounter. If applicable, non-patient-facing activities:  ( x)Preparing to see the patient and reviewing records  ( ) Individual interpretation of results  ( ) Discussion or coordination of care with other health care professionals  ( x) Ordering of unique tests, medications, or procedures  ( x) Documentation within the EHR      I appreciate the opportunity of cooperating in the care of this patient.     Jessica Krueger M.D., [facility]

## 2021-01-19 NOTE — TELEPHONE ENCOUNTER
----- Message from Yobani Jaimes MD sent at 1/19/2021  1:33 PM EST -----  Let patient know their echo test shows similar reduced heart function as compared to prior studies, continue current meds, no new orders or changes at this time. Thanks.

## 2021-01-19 NOTE — PATIENT INSTRUCTIONS
Plan:  1. Discussed possible MUGA scan  2. Decrease lasix to 40 mg M,F and 20 mg all other days  3. BMP and BNP in 1 month  4.  Follow up in 1 month

## 2021-01-19 NOTE — TELEPHONE ENCOUNTER
Per HIPAA left message on patients voicemail relaying the message from Dr. Namrata Santiago. Advised patient to call with any questions.

## 2021-01-21 ENCOUNTER — TELEPHONE (OUTPATIENT)
Dept: CARDIOLOGY CLINIC | Age: 56
End: 2021-01-21

## 2021-01-25 ENCOUNTER — TELEPHONE (OUTPATIENT)
Dept: CARDIOLOGY CLINIC | Age: 56
End: 2021-01-25

## 2021-01-25 NOTE — LETTER
[address] Card[facility] - [address]  Phone: [phone]  Fax: [phone]    Awa Ballard MD        January 25, 2021     Johntom Dykes [address]az  [address] [place] [address]    To Whom it May Concern:    Patient is to remain off work until his cardiac re-evaluation on 2/16/2021. If you have any questions or concerns, please don't hesitate to call.     Sincerely,        Awa Ballard MD

## 2021-02-05 ENCOUNTER — TELEPHONE (OUTPATIENT)
Dept: CARDIOLOGY CLINIC | Age: 56
End: 2021-02-05

## 2021-02-12 NOTE — PROGRESS NOTES
[address]  Advanced CHF/Pulmonary Hypertension   Cardiac Evaluation      Barbie Sandra  YOB: 1965    Date of Visit:  2/16/21      Chief Complaint   Patient presents with    Follow-up    Congestive Heart Failure        History of Present Illness: Charles Fonseca is a 60 y.o. male who presents from referral from Dr Sloane Hester for consultation and management of advanced heart failure. He presented to hospital on 10/12/20 with complaints of severe SOB, BLE edema and chest pain. Echo from 10/13/20 demonstrated EF 20-25%, moderate biatrial enlargement, mild TR and DC. His LHC/RHC 10/14/20 demonstrated severe nonischemic cardiomyopathy, no significant CAD. His cardiac MRI from 12/04/20 showed NICM concerning for inflammatory or infiltrative disease with biventricular involvement. His PET scan from 12/11/20 showed abnormal FDG uptake in the lateral wall of the left ventricle and the right atrium consistent with active myocardial inflammation. Severe global HK and severely impaired LV function at 17.6%. His CT chest from 12/15/20 showed the heart is enlarged with a small pericardial effusion. He is going to see  pulmonology soon. Dr Sloane Hester [facility] not had [facility] He reports he feels well overall. He has SOB on exertion and BLE edema which has been slowly improving. He reports he had increased fatigue for the last 1-2 years. He denies CP, palpitations, dizziness or syncope. He takes little naps during the day. He has been sleeping in his chair. He reports he was waking up feeling smothery. He has not slept in a bed since 09/2020. At his OV on 12/22/20, his lisinopril was stopped. He was started on Entresto 1/2 tab of 49/51 mg twice daily and digoxin 125 mcg daily. He reported he was sleeping when he experienced VT on 12/13/2020.  He had a 10.5 second episode of monomorphic VT with a 360 msec cycle length at 1:24PM. He states he woke up and his Lifevest showed an orange screen that said \"patient respond\", he pushed the button and went back to sleep. On 01/05/21 he reported DR Lockwood Or said he was 70% positive for sarcoidosis. He has been taking prednisone 40 mg daily. DR Lockwood Or started methotrexate. He reported he did not have any more alarms from his Life Vest. He is tolerating the entresto and digoxin well. He reports he feels better and has more energy. His weight is down almost 20 pounds. His glucose is elevated. He was started on spironolactone 25 mg M,W,F and his lasix was adjusted to 20 mg on M,W,F and 40 mg all other days at this OV. His limited echo from 01/19/21 showed his EF was 25%. Today he reports DR Lockwood Or decreased his prednisone to 30 mg then in 1 month go to 20 mg daily. His SBP has been controlled at home in the 110-120s. He denies CP, SOB, dizziness or syncope. He is taking lasix 40 mg M/F and 20 mg all other days. He is taking entresto 1/2 tab 49/51 mg twice daily. He takes spironolactone 3 days a week.          No Known Allergies  Current Outpatient Medications   Medication Sig Dispense Refill    predniSONE (DELTASONE) 10 MG tablet Take 30 mg by mouth daily      methotrexate (RHEUMATREX) 2.5 MG chemo tablet Take by mouth every other day      folic acid (FOLVITE) 1 MG tablet Take 1 mg by mouth daily      Multiple Vitamins-Minerals (THERAPEUTIC MULTIVITAMIN-MINERALS) tablet Take 1 tablet by mouth daily      spironolactone (ALDACTONE) 25 MG tablet 25 mg on M,W,F 30 tablet 5    sacubitril-valsartan (ENTRESTO) 49-51 MG per tablet Take 1 tablet by mouth 2 times daily (Patient taking differently: Take 0.5 tablets by mouth 2 times daily ) 60 tablet 5    metoprolol succinate (TOPROL XL) 50 MG extended release tablet Take 1 tablet by mouth daily 30 tablet 5    digoxin (LANOXIN) 125 MCG tablet Take 1 tablet by mouth daily 30 tablet 11    furosemide (LASIX) 40 MG tablet Take 1 tablet by mouth daily (Patient taking differently: Take 30 mg by Constitutional: there has been no unanticipated weight loss. There's been no change in energy level, sleep pattern, or activity level. · Eyes: No visual changes or diplopia. No scleral icterus. · ENT: No Headaches, hearing loss or vertigo. No mouth sores or sore throat. · Cardiovascular: Reviewed in HPI  · Respiratory: No cough or wheezing, no sputum production. No hematemesis. · Gastrointestinal: No abdominal pain, appetite loss, blood in stools. No change in bowel or bladder habits. · Genitourinary: No dysuria, trouble voiding, or hematuria. · Musculoskeletal:  No gait disturbance, weakness or joint complaints. · Integumentary: No rash or pruritis. · Neurological: No headache, diplopia, change in muscle strength, numbness or tingling. No change in gait, balance, coordination, mood, affect, memory, mentation, behavior. · Psychiatric: No anxiety, no depression. · Endocrine: No malaise, fatigue or temperature intolerance. No excessive thirst, fluid intake, or urination. No tremor. · Hematologic/Lymphatic: No abnormal bruising or bleeding, blood clots or swollen lymph nodes. · Allergic/Immunologic: No nasal congestion or hives. Physical Examination:    Vitals:    02/16/21 1105   BP: 114/72   Pulse: 68   SpO2: 99%   Weight: 182 lb (82.6 kg)   Height: 5' 10\" (1.778 m)     Body mass index is 26.11 kg/m².      Wt Readings from Last 3 Encounters:   02/16/21 182 lb (82.6 kg)   01/19/21 178 lb (80.7 kg)   01/05/21 179 lb 12.8 oz (81.6 kg)     BP Readings from Last 3 Encounters:   02/16/21 114/72   01/19/21 110/72   01/05/21 102/60           Constitutional and General Appearance:   WD/WN in NAD  HEENT:  NC/AT  TAMIE  No problems with hearing  Skin:  Warm, dry  Respiratory:  · Normal excursion and expansion without use of accessory muscles  · Resp Auscultation: Normal breath sounds without dullness  Cardiovascular:  · The apical impulses not displaced  · Heart tones are crisp and normal  · Cervical veins are not engorged  · The carotid upstroke is normal in amplitude and contour without delay or bruit  · JVP less than 8 cm H2O  RRR with nl S1 and S2 without m,r,g  · Peripheral pulses are symmetrical and full  · There is no clubbing, cyanosis of the extremities. · No edema  · Femoral Arteries: 2+ and equal  · Pedal Pulses: 2+ and equal   Neck:  · No thyromegaly  Abdomen:  · No masses or tenderness  · Liver/Spleen: No Abnormalities Noted  Neurological/Psychiatric:  · Alert and oriented in all spheres  · Moves all extremities well  · Exhibits normal gait balance and coordination  · No abnormalities of mood, affect, memory, mentation, or behavior are noted    Echo: 10/13/20  The left ventricular systolic function is severely reduced with an ejection   fraction of 20 - 25 %. There is hypokinesis of the apex, apical lateral, apical septum, anterior,   inferior, anteroseptum and apical anterior walls. Left ventricular cavity size is mildly dilated. Left ventricular diastolic filling pressure is elevated. Moderate mitral regurgitation. Mild Bi-atrial enlargement. The right ventricle is mildly enlarged. Right ventricular systolic function is mildly reduced . Mild tricuspid and pulmonic regurgitation. There is a left pleural effusion. Cardiac cath: 10/14/20  Findings:      LEFT HEART CATH  LM: ostial pinch (MLA by IVUS was >30)  LAD: luminals, luminals calcification  Ramus: luminals  LCX: Luminals  RCA: dominant, sluggish flow. KEVIN-2 flow. LVEDP: 15  LVEF: <20%, severe global hypokinesis. No MR, No AS        RIGHT HEART CATH  RA: 6   RV: 33/6   PA: 33/15   and mean of  22  PCWP: 15     Sats: on RA  Ao: 89%  RA:  59%  PA: 61%     CO/CI: 4.5/2.2 (stephanie)  SVR/PVR: 1272/122        Assessment  1. Severe nonischemic cardiomyopathy. LVEF less than 20%                -We will start secondary work-up                - will consider LifeVest  2. No significant CAD. Luminal calcification  3. Relatively normal mildly elevated intracardiac filling pressures. MRI card: 12/4/20  IMPRESSION: Findings are most consistent with non-ischemic cardiomyopathy concerning for inflammatory or infiltrative disease with biventricular involvement. Differential includes subacute myocarditis or an infiltrative process such as cardiac sarcoidosis. Recommend FDG PET for further evaluation for sarcoidosis. There is four chamber dilation with severely reduced biventricular systolic function. There is evidence of decompensated heart failure. PET: 12/11/20  IMPRESSION: 1. Abnormal FDG uptake in the lateral wall of the left ventricle, and the right atrium consistent with active myocardial inflammation. 2. Severely impaired left ventricular function with severe global hypokinesis. 3. Normal resting left ventricular perfusion. 4. Moderate bilateral pleural effusion. CT chest: 12/15/20  FINDINGS: Mediastinum: The heart is enlarged with a small pericardial effusion. Coronary artery calcifications are a marker of atherosclerosis. Scattered mediastinal lymph nodes are not pathologic by CT criteria. The lizbeth not be evaluated in the absence of intravenous contrast.  Lungs/pleura: The tracheobronchial tree is patent. There is no pneumothorax. There small to moderate bilateral pleural effusions with atelectasis, right greater than left. There is biapical scarring. There is mild bilateral interstitial thickening within the bases likely due to interstitial edema. Upper Abdomen: Images of the upper abdomen are unremarkable. Soft Tissues/Bones: Degenerative changes involve the thoracic spine. Limited echo: 01/19/21  Summary   Limited exam for LVEF. The left ventricular systolic function is moderate to severely reduced with   an ejection fraction of 25%. Global hypokinesis. Labs were reviewed including labs from other hospital systems through [facility].   Cardiac testing was reviewed including echos, nuclear scans, cardiac catheterization, including from other hospital systems through [facility]. Assessment:    1. Systolic CHF, chronic ([facility]    2. Essential hypertension    3. Left bundle branch block    4. Non-ischemic cardiomyopathy ([facility]    5. SOB (shortness of breath)    6. Cardiac sarcoidosis ([facility]       Quick Look 02/16/21 shows EF was around 35%, slow steady improvement over the last one. When he had his last echo, I reviewed it with Dr. Ghislaine Kelley and José Acuña, and we decided to delay placing ICD. We are continuing the LifeVest for another 6 weeks. Plan:  1. Increase entresto to 1/2 tablet of 49/51 mg in am and full tablet of 49/51 mg in pm  2. Muga scan the week of 03/30/21. Call [phone] to schedule  3. CBC, CMP, BNP before next visit  4. Follow up on 03/30/21 at 11:30      Scribe's attestation: This note was scribed in the presence of Carito Mcmahan M.D. By Siomara Metcalf RN    The scribe's documentation has been prepared under my direction and personally reviewed by me in its entirety. I confirm that the note above accurately reflects all work, treatment, procedures, and medical decision making performed by me. Time Based Itemization  A total of 40 minutes was spent on today's patient encounter. If applicable, non-patient-facing activities:  ( x)Preparing to see the patient and reviewing records  ( x) Individual interpretation of results  ( x) Discussion or coordination of care with other health care professionals  ( x) Ordering of unique tests, medications, or procedures  ( x) Documentation within the EHR       I appreciate the opportunity of cooperating in the care of this patient.     Carito Mcmahan M.D., [facility]

## 2021-02-15 ENCOUNTER — HOSPITAL ENCOUNTER (OUTPATIENT)
Age: 56
Discharge: HOME | End: 2021-02-15
Payer: COMMERCIAL

## 2021-02-15 DIAGNOSIS — I50.21 ACUTE SYSTOLIC HEART FAILURE (HCC): ICD-10-CM

## 2021-02-15 LAB
A/G RATIO: 1.6 (ref 1.1–2.2)
ALBUMIN SERPL-MCNC: 3.6 G/DL (ref 3.4–5)
ALP BLD-CCNC: 42 U/L (ref 40–129)
ALT SERPL-CCNC: 44 U/L (ref 10–40)
ANION GAP SERPL CALCULATED.3IONS-SCNC: 8 MMOL/L (ref 3–16)
AST SERPL-CCNC: 25 U/L (ref 15–37)
BASOPHILS ABSOLUTE: 0 K/UL (ref 0–0.2)
BASOPHILS RELATIVE PERCENT: 0.3 %
BILIRUB SERPL-MCNC: 0.4 MG/DL (ref 0–1)
BUN BLDV-MCNC: 27 MG/DL (ref 7–20)
CALCIUM SERPL-MCNC: 8.9 MG/DL (ref 8.3–10.6)
CHLORIDE BLD-SCNC: 105 MMOL/L (ref 99–110)
CO2: 29 MMOL/L (ref 21–32)
CREAT SERPL-MCNC: 1 MG/DL (ref 0.9–1.3)
EOSINOPHILS ABSOLUTE: 0 K/UL (ref 0–0.6)
EOSINOPHILS RELATIVE PERCENT: 0.4 %
GFR AFRICAN AMERICAN: >60
GFR NON-AFRICAN AMERICAN: >60
GLOBULIN: 2.2 G/DL
GLUCOSE BLD-MCNC: 176 MG/DL (ref 70–99)
HCT VFR BLD CALC: 40.5 % (ref 40.5–52.5)
HEMOGLOBIN: 13.3 G/DL (ref 13.5–17.5)
LYMPHOCYTES ABSOLUTE: 2.6 K/UL (ref 1–5.1)
LYMPHOCYTES RELATIVE PERCENT: 23 %
MCH RBC QN AUTO: 27.3 PG (ref 26–34)
MCHC RBC AUTO-ENTMCNC: 32.9 G/DL (ref 31–36)
MCV RBC AUTO: 83.1 FL (ref 80–100)
MONOCYTES ABSOLUTE: 0.6 K/UL (ref 0–1.3)
MONOCYTES RELATIVE PERCENT: 5.5 %
NEUTROPHILS ABSOLUTE: 7.9 K/UL (ref 1.7–7.7)
NEUTROPHILS RELATIVE PERCENT: 70.8 %
PDW BLD-RTO: 15.4 % (ref 12.4–15.4)
PLATELET # BLD: 153 K/UL (ref 135–450)
PMV BLD AUTO: 8.5 FL (ref 5–10.5)
POTASSIUM SERPL-SCNC: 4.1 MMOL/L (ref 3.5–5.1)
PRO-BNP: 963 PG/ML (ref 0–124)
RBC # BLD: 4.87 M/UL (ref 4.2–5.9)
SODIUM BLD-SCNC: 142 MMOL/L (ref 136–145)
TOTAL PROTEIN: 5.8 G/DL (ref 6.4–8.2)
WBC # BLD: 11.2 K/UL (ref 4–11)

## 2021-02-15 PROCEDURE — 83880 ASSAY OF NATRIURETIC PEPTIDE: CPT

## 2021-02-15 PROCEDURE — 80053 COMPREHEN METABOLIC PANEL: CPT

## 2021-02-15 PROCEDURE — 36415 COLL VENOUS BLD VENIPUNCTURE: CPT

## 2021-02-15 PROCEDURE — 85025 COMPLETE CBC W/AUTO DIFF WBC: CPT

## 2021-02-16 ENCOUNTER — TELEPHONE (OUTPATIENT)
Dept: CARDIOLOGY CLINIC | Age: 56
End: 2021-02-16

## 2021-02-16 ENCOUNTER — OFFICE VISIT (OUTPATIENT)
Dept: CARDIOLOGY CLINIC | Age: 56
End: 2021-02-16
Payer: COMMERCIAL

## 2021-02-16 VITALS
SYSTOLIC BLOOD PRESSURE: 114 MMHG | HEART RATE: 68 BPM | WEIGHT: 182 LBS | OXYGEN SATURATION: 99 % | HEIGHT: 70 IN | DIASTOLIC BLOOD PRESSURE: 72 MMHG | BODY MASS INDEX: 26.05 KG/M2

## 2021-02-16 DIAGNOSIS — D86.85 CARDIAC SARCOIDOSIS (HCC): ICD-10-CM

## 2021-02-16 DIAGNOSIS — I50.22 SYSTOLIC CHF, CHRONIC (HCC): Primary | ICD-10-CM

## 2021-02-16 DIAGNOSIS — I44.7 LEFT BUNDLE BRANCH BLOCK: ICD-10-CM

## 2021-02-16 DIAGNOSIS — R06.02 SOB (SHORTNESS OF BREATH): ICD-10-CM

## 2021-02-16 DIAGNOSIS — I10 ESSENTIAL HYPERTENSION: ICD-10-CM

## 2021-02-16 DIAGNOSIS — I42.8 NON-ISCHEMIC CARDIOMYOPATHY (HCC): ICD-10-CM

## 2021-02-16 PROCEDURE — 99215 OFFICE O/P EST HI 40 MIN: CPT | Performed by: INTERNAL MEDICINE

## 2021-02-16 RX ORDER — PREDNISONE 10 MG/1
30 TABLET ORAL DAILY
COMMUNITY
End: 2021-03-30

## 2021-02-16 NOTE — LETTER
[address]  Advanced CHF/Pulmonary Hypertension   Cardiac Evaluation      Cayla Sandra  YOB: 1965    Date of Visit:  2/16/21      Chief Complaint   Patient presents with    Follow-up    Congestive Heart Failure        History of Present Illness: Lennie Cowan is a 60 y.o. male who presents from referral from Dr Jane Mcfarlane for consultation and management of advanced heart failure. He presented to hospital on 10/12/20 with complaints of severe SOB, BLE edema and chest pain. Echo from 10/13/20 demonstrated EF 20-25%, moderate biatrial enlargement, mild TR and NM. His LHC/RHC 10/14/20 demonstrated severe nonischemic cardiomyopathy, no significant CAD. His cardiac MRI from 12/04/20 showed NICM concerning for inflammatory or infiltrative disease with biventricular involvement. His PET scan from 12/11/20 showed abnormal FDG uptake in the lateral wall of the left ventricle and the right atrium consistent with active myocardial inflammation. Severe global HK and severely impaired LV function at 17.6%. His CT chest from 12/15/20 showed the heart is enlarged with a small pericardial effusion. He is going to see  pulmonology soon. Dr Jane Mcfarlane has not had genetic testing yet [facility] He reports he feels well overall. He has SOB on exertion and BLE edema which has been slowly improving. He reports he had increased fatigue for the last 1-2 years. He denies CP, palpitations, dizziness or syncope. He takes little naps during the day. He has been sleeping in his chair. He reports he was waking up feeling smothery. He has not slept in a bed since 09/2020. At his OV on 12/22/20, his lisinopril was stopped. He was started on Entresto 1/2 tab of 49/51 mg twice daily and digoxin 125 mcg daily. He reported he was sleeping when he experienced VT on 12/13/2020. He had a 10.5 second episode of monomorphic VT with a 360 msec cycle length at 1:24PM. He states he woke up and his Lifevest showed an orange screen that said \"patient respond\", he pushed the button and went back to sleep. On 01/05/21 he reported DR Ayden Philip said he was 70% positive for sarcoidosis. He has been taking prednisone 40 mg daily. DR Ayden Philip started methotrexate. He reported he did not have any more alarms from his Life Vest. He is tolerating the entresto and digoxin well. He reports he feels better and has more energy. His weight is down almost 20 pounds. His glucose is elevated. He was started on spironolactone 25 mg M,W,F and his lasix was adjusted to 20 mg on M,W,F and 40 mg all other days at this OV. His limited echo from 01/19/21 showed his EF was 25%. Today he reports DR Ayden Philip decreased his prednisone to 30 mg then in 1 month go to 20 mg daily. His SBP has been controlled at home in the 110-120s. He denies CP, SOB, dizziness or syncope. He is taking lasix 40 mg M/F and 20 mg all other days. He is taking entresto 1/2 tab 49/51 mg twice daily. He takes spironolactone 3 days a week.          No Known Allergies  Current Outpatient Medications   Medication Sig Dispense Refill    predniSONE (DELTASONE) 10 MG tablet Take 30 mg by mouth daily      methotrexate (RHEUMATREX) 2.5 MG chemo tablet Take by mouth every other day      folic acid (FOLVITE) 1 MG tablet Take 1 mg by mouth daily      Multiple Vitamins-Minerals (THERAPEUTIC MULTIVITAMIN-MINERALS) tablet Take 1 tablet by mouth daily      spironolactone (ALDACTONE) 25 MG tablet 25 mg on M,W,F 30 tablet 5    sacubitril-valsartan (ENTRESTO) 49-51 MG per tablet Take 1 tablet by mouth 2 times daily (Patient taking differently: Take 0.5 tablets by mouth 2 times daily ) 60 tablet 5  Intimate partner violence     Fear of current or ex partner: Not on file     Emotionally abused: Not on file     Physically abused: Not on file     Forced sexual activity: Not on file   Other Topics Concern    Not on file   Social History Narrative    Not on file       Review of Systems:   · Constitutional: there has been no unanticipated weight loss. There's been no change in energy level, sleep pattern, or activity level. · Eyes: No visual changes or diplopia. No scleral icterus. · ENT: No Headaches, hearing loss or vertigo. No mouth sores or sore throat. · Cardiovascular: Reviewed in HPI  · Respiratory: No cough or wheezing, no sputum production. No hematemesis. · Gastrointestinal: No abdominal pain, appetite loss, blood in stools. No change in bowel or bladder habits. · Genitourinary: No dysuria, trouble voiding, or hematuria. · Musculoskeletal:  No gait disturbance, weakness or joint complaints. · Integumentary: No rash or pruritis. · Neurological: No headache, diplopia, change in muscle strength, numbness or tingling. No change in gait, balance, coordination, mood, affect, memory, mentation, behavior. · Psychiatric: No anxiety, no depression. · Endocrine: No malaise, fatigue or temperature intolerance. No excessive thirst, fluid intake, or urination. No tremor. · Hematologic/Lymphatic: No abnormal bruising or bleeding, blood clots or swollen lymph nodes. · Allergic/Immunologic: No nasal congestion or hives. Physical Examination:    Vitals:    02/16/21 1105   BP: 114/72   Pulse: 68   SpO2: 99%   Weight: 182 lb (82.6 kg)   Height: 5' 10\" (1.778 m)     Body mass index is 26.11 kg/m².      Wt Readings from Last 3 Encounters:   02/16/21 182 lb (82.6 kg)   01/19/21 178 lb (80.7 kg)   01/05/21 179 lb 12.8 oz (81.6 kg)     BP Readings from Last 3 Encounters:   02/16/21 114/72   01/19/21 110/72   01/05/21 102/60           Constitutional and General Appearance:   WD/WN in NAD  HEENT:  NC/AT TAMIE  No problems with hearing  Skin:  Warm, dry  Respiratory:  · Normal excursion and expansion without use of accessory muscles  · Resp Auscultation: Normal breath sounds without dullness  Cardiovascular:  · The apical impulses not displaced  · Heart tones are crisp and normal  · Cervical veins are not engorged  · The carotid upstroke is normal in amplitude and contour without delay or bruit  · JVP less than 8 cm H2O  RRR with nl S1 and S2 without m,r,g  · Peripheral pulses are symmetrical and full  · There is no clubbing, cyanosis of the extremities. · No edema  · Femoral Arteries: 2+ and equal  · Pedal Pulses: 2+ and equal   Neck:  · No thyromegaly  Abdomen:  · No masses or tenderness  · Liver/Spleen: No Abnormalities Noted  Neurological/Psychiatric:  · Alert and oriented in all spheres  · Moves all extremities well  · Exhibits normal gait balance and coordination  · No abnormalities of mood, affect, memory, mentation, or behavior are noted    Echo: 10/13/20  The left ventricular systolic function is severely reduced with an ejection   fraction of 20 - 25 %. There is hypokinesis of the apex, apical lateral, apical septum, anterior,   inferior, anteroseptum and apical anterior walls. Left ventricular cavity size is mildly dilated. Left ventricular diastolic filling pressure is elevated. Moderate mitral regurgitation. Mild Bi-atrial enlargement. The right ventricle is mildly enlarged. Right ventricular systolic function is mildly reduced . Mild tricuspid and pulmonic regurgitation. There is a left pleural effusion. Cardiac cath: 10/14/20  Findings:      LEFT HEART CATH  LM: ostial pinch (MLA by IVUS was >30)  LAD: luminals, luminals calcification  Ramus: luminals  LCX: Luminals  RCA: dominant, sluggish flow. KEVIN-2 flow. LVEDP: 15  LVEF: <20%, severe global hypokinesis.                 No MR, No AS        RIGHT HEART CATH  RA: 6   RV: 33/6   PA: 33/15   and mean of  22  PCWP: 15 Summary   Limited exam for LVEF. The left ventricular systolic function is moderate to severely reduced with   an ejection fraction of 25%. Global hypokinesis. Labs were reviewed including labs from other hospital systems through [facility]. Cardiac testing was reviewed including echos, nuclear scans, cardiac catheterization, including from other hospital systems through [facility]. Assessment:    1. Systolic CHF, chronic ([facility] [address]    2. Essential hypertension    3. Left bundle branch block    4. Non-ischemic cardiomyopathy ([facility] [address]    5. SOB (shortness of breath)    6. Cardiac sarcoidosis ([facility] [address]       Quick Look 02/16/21 shows EF was around 35%, slow steady improvement over the last one. When he had his last echo, I reviewed it with Dr. Nirmal Leong and Theresa Espinoza, and we decided to delay placing ICD. We are continuing the LifeVest for another 6 weeks. Plan:  1. Increase entresto to 1/2 tablet of 49/51 mg in am and full tablet of 49/51 mg in pm  2. Muga scan the week of 03/30/21. Call [phone] to schedule  3. CBC, CMP, BNP before next visit  4. Follow up on 03/30/21 at 11:30      Scribe's attestation: This note was scribed in the presence of Minerva Curtis M.D. By Trudi Forrester RN    The scribe's documentation has been prepared under my direction and personally reviewed by me in its entirety. I confirm that the note above accurately reflects all work, treatment, procedures, and medical decision making performed by me. Time Based Itemization  A total of 40 minutes was spent on today's patient encounter. If applicable, non-patient-facing activities:  ( x)Preparing to see the patient and reviewing records  ( x) Individual interpretation of results  ( x) Discussion or coordination of care with other health care professionals  ( x) Ordering of unique tests, medications, or procedures  ( x) Documentation within the EHR       I appreciate the opportunity of cooperating in the care of this patient. Key Harrington M.D., [facility]

## 2021-02-16 NOTE — PATIENT INSTRUCTIONS
Plan:  1. Increase entresto to 1/2 tablet of 49/51 mg in am and full tablet of 49/51 mg in pm  2. Muga scan the week of 03/30/21. Call [phone] to schedule  3. CBC, CMP, BNP before next visit  4. Follow up on 03/30/21 at 11:30    Your provider has ordered testing for further evaluation. An order/prescription has been included in your paper work. ? To schedule outpatient testing, contact Central Scheduling by calling [address] [place] [address] ([phone]).

## 2021-02-16 NOTE — TELEPHONE ENCOUNTER
LMOVM for pt and wife. Muga is scheduled 03/24 at 12:15 at [facility] office. The department does not have appts for Muga scans on Tuesdays.

## 2021-02-16 NOTE — LETTER
[address] Cardiology - [address]  Phone: [phone]  Fax: [phone]    Yulia Marie MD        February 16, 2021    [facility]  [address] [facility] [address]      To Whom It May Concern,     [place] XCAWR 1965 is to remain off work until cardiac evaluation on 03/30/2021. If you have any questions or concerns, please don't hesitate to call.     Sincerely,        Yulia Marie MD

## 2021-02-22 RX ORDER — PREDNISONE 20 MG/1
TABLET ORAL
Qty: 60 TABLET | Refills: 0 | Status: SHIPPED
Start: 2021-02-22 | End: 2021-03-30 | Stop reason: CLARIF

## 2021-03-08 RX ORDER — ASPIRIN 81 MG/1
TABLET, CHEWABLE ORAL
Qty: 90 TABLET | Refills: 3 | Status: SHIPPED | OUTPATIENT
Start: 2021-03-08 | End: 2022-02-28 | Stop reason: SDUPTHER

## 2021-03-24 ENCOUNTER — HOSPITAL ENCOUNTER (OUTPATIENT)
Dept: CARDIOLOGY | Age: 56
Discharge: HOME | End: 2021-03-24
Payer: COMMERCIAL

## 2021-03-24 DIAGNOSIS — I50.22 SYSTOLIC CHF, CHRONIC (HCC): ICD-10-CM

## 2021-03-24 DIAGNOSIS — I42.8 NON-ISCHEMIC CARDIOMYOPATHY (HCC): ICD-10-CM

## 2021-03-24 LAB
LV EF: 43 %
LVEF MODALITY: NORMAL

## 2021-03-24 PROCEDURE — A9560 TC99M LABELED RBC: HCPCS | Performed by: INTERNAL MEDICINE

## 2021-03-24 PROCEDURE — 78472 GATED HEART PLANAR SINGLE: CPT

## 2021-03-24 PROCEDURE — 3430000000 HC RX DIAGNOSTIC RADIOPHARMACEUTICAL: Performed by: INTERNAL MEDICINE

## 2021-03-24 RX ADMIN — Medication 23.5 MILLICURIE: at 13:10

## 2021-03-25 ENCOUNTER — TELEPHONE (OUTPATIENT)
Dept: CARDIOLOGY CLINIC | Age: 56
End: 2021-03-25

## 2021-03-25 NOTE — TELEPHONE ENCOUNTER
Spoke to pt.  Gave him the great news per OLMAN!! Pt is sending his life vest back and is very excited that is ejection fraction is up to 43%!!! YAY!! Pt V/U.    TY

## 2021-03-25 NOTE — TELEPHONE ENCOUNTER
----- Message from Joseph Bauer MD sent at 3/24/2021  5:30 PM EDT -----  Please call the patient and let him know the good news. Tok[place], great news!!! Your ejection fraction is 43%. YAY!!!!  You can send the lifevest back! I will have the office call you to confirm you received this message.   Joseph Bauer

## 2021-03-26 NOTE — PROGRESS NOTES
[address]  Advanced CHF/Pulmonary Hypertension   Cardiac Evaluation      Josiane Sandra  YOB: 1965    Date of Visit:  3/30/21      Chief Complaint   Patient presents with    Follow-up    Congestive Heart Failure          History of Present Illness: Balaji Rouse is a 60 y.o. male who presents from referral from Dr Jelena Vargas for consultation and management of advanced heart failure. He presented to hospital on 10/12/20 with complaints of severe SOB, BLE edema and chest pain. Echo from 10/13/20 demonstrated EF 20-25%, moderate biatrial enlargement, mild TR and MT. His LHC/RHC 10/14/20 demonstrated severe nonischemic cardiomyopathy, no significant CAD. His cardiac MRI from 12/04/20 showed NICM concerning for inflammatory or infiltrative disease with biventricular involvement. His PET scan from 12/11/20 showed abnormal FDG uptake in the lateral wall of the left ventricle and the right atrium consistent with active myocardial inflammation. Severe global HK and severely impaired LV function at 17.6%. His CT chest from 12/15/20 showed the heart is enlarged with a small pericardial effusion. He is going to see  pulmonology soon. Dr Jelena Vargas has not had [facility] testing yet [facility] He reports he feels well overall. He has SOB on exertion and BLE edema which has been slowly improving. He reports he had increased fatigue for the last 1-2 years. He denies CP, palpitations, dizziness or syncope. He takes little naps during the day. He has been sleeping in his chair. He reports he was waking up feeling smothery. He has not slept in a bed since 09/2020. At his OV on 12/22/20, his lisinopril was stopped. He was started on Entresto 1/2 tab of 49/51 mg twice daily and digoxin 125 mcg daily. He reported he was sleeping when he experienced VT on 12/13/2020.  He had a 10.5 second episode of monomorphic VT with a 360 msec cycle length at 1:24PM. He states he woke up and his Lifevest showed an orange screen that said \"patient respond\", he pushed the button and went back to sleep. On 01/05/21 he reported DR Meryl Meyer said he was 70% positive for sarcoidosis. He has been taking prednisone 40 mg daily. DR Meryl Meyer started methotrexate. He reported he did not have any more alarms from his Life Vest. He is tolerating the entresto and digoxin well. He reports he feels better and has more energy. His weight is down almost 20 pounds. His glucose is elevated. He was started on spironolactone 25 mg M,W,F and his lasix was adjusted to 20 mg on M,W,F and 40 mg all other days at this OV. His limited echo from 01/19/21 showed his EF was 25%. On 02/16/21 he reported DR Meryl Meyer decreased his prednisone to 30 mg then in 1 month go to 20 mg daily. His SBP has been controlled at home in the 110-120s. He is taking entresto 1/2 tab 49/51 mg twice daily. He takes spironolactone 3 days a week. At this OV his entresto was increased to 1/2 tablet 49/51 mg in am and full tablet 49/51 mg in pm.   His muga scan from 03/24/21 his EF was 42.9%. Life Vest off 03/25/21. Today he reports he feels great. He is sending the Life Vest back today. He denies CP, palpitations, SOB, dizziness or syncope. He is on prednisone 20 mg daily. On methotrexate 2.5 mg four days a week. He tried to test himself with carrying 100 lbs of soil and was able to carrying it pretty far before needing to set it down.          No Known Allergies  Current Outpatient Medications   Medication Sig Dispense Refill    predniSONE (DELTASONE) 20 MG tablet Take 20 mg by mouth daily      aspirin 81 MG chewable tablet CHEW ONE TABLET BY MOUTH DAILY 90 tablet 3    methotrexate (RHEUMATREX) 2.5 MG chemo tablet Take by mouth 4 days weekly      folic acid (FOLVITE) 1 MG tablet Take 1 mg by mouth daily      Multiple Vitamins-Minerals (THERAPEUTIC MULTIVITAMIN-MINERALS) tablet Take 1 tablet by mouth daily      spironolactone (ALDACTONE) 25 MG tablet 25 mg on M,W,F 30 tablet 5    sacubitril-valsartan (ENTRESTO) 49-51 MG per tablet Take 1 tablet by mouth 2 times daily (Patient taking differently: Take by mouth 1/2 tab am, 1 tab pm) 60 tablet 5    metoprolol succinate (TOPROL XL) 50 MG extended release tablet Take 1 tablet by mouth daily 30 tablet 5    digoxin (LANOXIN) 125 MCG tablet Take 1 tablet by mouth daily 30 tablet 11    furosemide (LASIX) 40 MG tablet Take 1 tablet by mouth daily (Patient taking differently: Take by mouth 40mg daily on Tuesday & Saturday, then 20mg daily all other days) 30 tablet 5    Blood Glucose Monitoring Suppl (takokat BLOOD GLUCOSE KIT) KIT 1 each by Does not apply route daily 1 kit 0     No current facility-administered medications for this visit. History reviewed. No pertinent past medical history. History reviewed. No pertinent surgical history.   Family History   Problem Relation Age of Onset    Cancer Mother         skin     Cancer Father         skin, prostate     Cancer Sister         skin, Melanoma     Social History     Socioeconomic History    Marital status:      Spouse name: Not on file    Number of children: Not on file    Years of education: Not on file    Highest education level: Not on file   Occupational History    Not on file   Social Needs    Financial resource strain: Not on file    Food insecurity     Worry: Not on file     Inability: Not on file    Transportation needs     Medical: Not on file     Non-medical: Not on file   Tobacco Use    Smoking status: Never Smoker    Smokeless tobacco: Never Used   Substance and Sexual Activity    Alcohol use: Yes     Comment: rarely    Drug use: No    Sexual activity: Not on file   Lifestyle    Physical activity     Days per week: Not on file     Minutes per session: Not on file    Stress: Not on file   Relationships    Social connections     Talks on phone: Not on file     Gets together: Not on file     Attends Religion service: Not on file Active member of club or organization: Not on file     Attends meetings of clubs or organizations: Not on file     Relationship status: Not on file    Intimate partner violence     Fear of current or ex partner: Not on file     Emotionally abused: Not on file     Physically abused: Not on file     Forced sexual activity: Not on file   Other Topics Concern    Not on file   Social History Narrative    Not on file       Review of Systems:   · Constitutional: there has been no unanticipated weight loss. There's been no change in energy level, sleep pattern, or activity level. · Eyes: No visual changes or diplopia. No scleral icterus. · ENT: No Headaches, hearing loss or vertigo. No mouth sores or sore throat. · Cardiovascular: Reviewed in HPI  · Respiratory: No cough or wheezing, no sputum production. No hematemesis. · Gastrointestinal: No abdominal pain, appetite loss, blood in stools. No change in bowel or bladder habits. · Genitourinary: No dysuria, trouble voiding, or hematuria. · Musculoskeletal:  No gait disturbance, weakness or joint complaints. · Integumentary: No rash or pruritis. · Neurological: No headache, diplopia, change in muscle strength, numbness or tingling. No change in gait, balance, coordination, mood, affect, memory, mentation, behavior. · Psychiatric: No anxiety, no depression. · Endocrine: No malaise, fatigue or temperature intolerance. No excessive thirst, fluid intake, or urination. No tremor. · Hematologic/Lymphatic: No abnormal bruising or bleeding, blood clots or swollen lymph nodes. · Allergic/Immunologic: No nasal congestion or hives. Physical Examination:    Vitals:    03/30/21 1203   BP: 122/74   Pulse: 52   SpO2: 97%   Weight: 190 lb (86.2 kg)   Height: 5' 10\" (1.778 m)     Body mass index is 27.26 kg/m².      Wt Readings from Last 3 Encounters:   03/30/21 190 lb (86.2 kg)   02/16/21 182 lb (82.6 kg)   01/19/21 178 lb (80.7 kg)     BP Readings from Last 3 Encounters:   03/30/21 122/74   02/16/21 114/72   01/19/21 110/72            Constitutional and General Appearance:   WD/WN in NAD  HEENT:  NC/AT  TAMIE  No problems with hearing  Skin:  Warm, dry  Respiratory:  · Normal excursion and expansion without use of accessory muscles  · Resp Auscultation: Normal breath sounds without dullness  Cardiovascular:  · The apical impulses not displaced  · Heart tones are crisp and normal  · Cervical veins are not engorged  · The carotid upstroke is normal in amplitude and contour without delay or bruit  · JVP less than 8 cm H2O  RRR with nl S1 and S2 without m,r,g  · Peripheral pulses are symmetrical and full  · There is no clubbing, cyanosis of the extremities. · No edema  · Femoral Arteries: 2+ and equal  · Pedal Pulses: 2+ and equal   Neck:  · No thyromegaly  Abdomen:  · No masses or tenderness  · Liver/Spleen: No Abnormalities Noted  Neurological/Psychiatric:  · Alert and oriented in all spheres  · Moves all extremities well  · Exhibits normal gait balance and coordination  · No abnormalities of mood, affect, memory, mentation, or behavior are noted    Echo: 10/13/20  The left ventricular systolic function is severely reduced with an ejection   fraction of 20 - 25 %. There is hypokinesis of the apex, apical lateral, apical septum, anterior,   inferior, anteroseptum and apical anterior walls. Left ventricular cavity size is mildly dilated. Left ventricular diastolic filling pressure is elevated. Moderate mitral regurgitation. Mild Bi-atrial enlargement. The right ventricle is mildly enlarged. Right ventricular systolic function is mildly reduced . Mild tricuspid and pulmonic regurgitation. There is a left pleural effusion. Cardiac cath: 10/14/20  Findings:      LEFT HEART CATH  LM: ostial pinch (MLA by IVUS was >30)  LAD: luminals, luminals calcification  Ramus: luminals  LCX: Luminals  RCA: dominant, sluggish flow. KEVIN-2 flow.       LVEDP: 15  LVEF: <20%, severe global hypokinesis. No MR, No AS        RIGHT HEART CATH  RA: 6   RV: 33/6   PA: 33/15   and mean of  22  PCWP: 15     Sats: on RA  Ao: 89%  RA:  59%  PA: 61%     CO/CI: 4.5/2.2 (stephanie)  SVR/PVR: 1272/122        Assessment  1. Severe nonischemic cardiomyopathy. LVEF less than 20%                -We will start secondary work-up                - will consider LifeVest  2. No significant CAD. Luminal calcification  3.  Relatively normal mildly elevated intracardiac filling pressures. MRI card: 12/4/20  IMPRESSION: Findings are most consistent with non-ischemic cardiomyopathy concerning for inflammatory or infiltrative disease with biventricular involvement. Differential includes subacute myocarditis or an infiltrative process such as cardiac sarcoidosis. Recommend FDG PET for further evaluation for sarcoidosis. There is four chamber dilation with severely reduced biventricular systolic function. There is evidence of decompensated heart failure. PET: 12/11/20  IMPRESSION: 1. Abnormal FDG uptake in the lateral wall of the left ventricle, and the right atrium consistent with active myocardial inflammation. 2. Severely impaired left ventricular function with severe global hypokinesis. 3. Normal resting left ventricular perfusion. 4. Moderate bilateral pleural effusion. CT chest: 12/15/20  FINDINGS: Mediastinum: The heart is enlarged with a small pericardial effusion. Coronary artery calcifications are a marker of atherosclerosis. Scattered mediastinal lymph nodes are not pathologic by CT criteria. The lizbeth not be evaluated in the absence of intravenous contrast.  Lungs/pleura: The tracheobronchial tree is patent. There is no pneumothorax. There small to moderate bilateral pleural effusions with atelectasis, right greater than left. There is biapical scarring. There is mild bilateral interstitial thickening within the bases likely due to interstitial edema.   Upper Abdomen: Images of the upper abdomen are unremarkable. Soft Tissues/Bones: Degenerative changes involve the thoracic spine. Limited echo: 01/19/21  Summary   Limited exam for LVEF. The left ventricular systolic function is moderate to severely reduced with   an ejection fraction of 25%. Global hypokinesis. Muga: 03/24/21  MUGA Conclusions  Abnormal resting left ventricular function with EF= 42.9% . Unable to  determine wall motion on current images. Labs were reviewed including labs from other hospital systems through [facility]. Cardiac testing was reviewed including echos, nuclear scans, cardiac catheterization, including from other hospital systems through [facility]. Assessment:    1. Systolic CHF, chronic ([facility]    2. Non-ischemic cardiomyopathy ([facility]    3. Essential hypertension    4. Left bundle branch block    5. Cardiac sarcoidosis ([facility]    6. Gastroesophageal reflux disease without esophagitis    7. Palpitations          Muga scan on 03/24/21 showed EF was 43%. Life Vest discontinued. Plan:  1. CRP, sed rate, CMP, CBC and BNP in 1 month  2. Increase entresto to 97/103 mg a half tablet twice daily  3. Start pantoprazole 40 mg daily due to his chronic prednisone usage  4. 2 week cardiac monitor for palpitations or arrhythmias given his cardiac sarcoidosis  5. Follow up in 2 months    Scribe's attestation: This note was scribed in the presence of Amanda Araiza M.D. By Faustina Flynn RN    The scribe's documentation has been prepared under my direction and personally reviewed by me in its entirety. I confirm that the note above accurately reflects all work, treatment, procedures, and medical decision making performed by me. Time Based Itemization  A total of 40 minutes was spent on today's patient encounter.   If applicable, non-patient-facing activities:  ( x)Preparing to see the patient and reviewing records  ( ) Individual interpretation of results  ( ) Discussion or coordination of care with other health care professionals  ( x) Ordering of unique tests, medications, or procedures  ( x) Documentation within the EHR      I appreciate the opportunity of cooperating in the care of this patient.     Elana Skinner M.D., [facility] [place]

## 2021-03-29 ENCOUNTER — HOSPITAL ENCOUNTER (OUTPATIENT)
Age: 56
Discharge: HOME | End: 2021-03-29
Payer: COMMERCIAL

## 2021-03-29 DIAGNOSIS — I50.22 SYSTOLIC CHF, CHRONIC (HCC): ICD-10-CM

## 2021-03-29 LAB
A/G RATIO: 1.9 (ref 1.1–2.2)
ALBUMIN SERPL-MCNC: 4.1 G/DL (ref 3.4–5)
ALP BLD-CCNC: 47 U/L (ref 40–129)
ALT SERPL-CCNC: 70 U/L (ref 10–40)
ANION GAP SERPL CALCULATED.3IONS-SCNC: 13 MMOL/L (ref 3–16)
AST SERPL-CCNC: 37 U/L (ref 15–37)
BILIRUB SERPL-MCNC: 0.5 MG/DL (ref 0–1)
BUN BLDV-MCNC: 19 MG/DL (ref 7–20)
CALCIUM SERPL-MCNC: 9.2 MG/DL (ref 8.3–10.6)
CHLORIDE BLD-SCNC: 102 MMOL/L (ref 99–110)
CO2: 29 MMOL/L (ref 21–32)
CREAT SERPL-MCNC: 1 MG/DL (ref 0.9–1.3)
GFR AFRICAN AMERICAN: >60
GFR NON-AFRICAN AMERICAN: >60
GLOBULIN: 2.2 G/DL
GLUCOSE BLD-MCNC: 159 MG/DL (ref 70–99)
POTASSIUM SERPL-SCNC: 4.4 MMOL/L (ref 3.5–5.1)
PRO-BNP: 347 PG/ML (ref 0–124)
SODIUM BLD-SCNC: 144 MMOL/L (ref 136–145)
TOTAL PROTEIN: 6.3 G/DL (ref 6.4–8.2)

## 2021-03-29 PROCEDURE — 36415 COLL VENOUS BLD VENIPUNCTURE: CPT

## 2021-03-29 PROCEDURE — 83880 ASSAY OF NATRIURETIC PEPTIDE: CPT

## 2021-03-29 PROCEDURE — 85025 COMPLETE CBC W/AUTO DIFF WBC: CPT

## 2021-03-29 PROCEDURE — 80053 COMPREHEN METABOLIC PANEL: CPT

## 2021-03-30 ENCOUNTER — OFFICE VISIT (OUTPATIENT)
Dept: CARDIOLOGY CLINIC | Age: 56
End: 2021-03-30
Payer: COMMERCIAL

## 2021-03-30 ENCOUNTER — TELEPHONE (OUTPATIENT)
Dept: CARDIOLOGY CLINIC | Age: 56
End: 2021-03-30

## 2021-03-30 VITALS
HEART RATE: 52 BPM | HEIGHT: 70 IN | OXYGEN SATURATION: 97 % | BODY MASS INDEX: 27.2 KG/M2 | DIASTOLIC BLOOD PRESSURE: 74 MMHG | SYSTOLIC BLOOD PRESSURE: 122 MMHG | WEIGHT: 190 LBS

## 2021-03-30 DIAGNOSIS — R00.2 PALPITATIONS: ICD-10-CM

## 2021-03-30 DIAGNOSIS — I10 ESSENTIAL HYPERTENSION: ICD-10-CM

## 2021-03-30 DIAGNOSIS — I50.22 SYSTOLIC CHF, CHRONIC (HCC): Primary | ICD-10-CM

## 2021-03-30 DIAGNOSIS — I42.8 NON-ISCHEMIC CARDIOMYOPATHY (HCC): ICD-10-CM

## 2021-03-30 DIAGNOSIS — D86.85 CARDIAC SARCOIDOSIS (HCC): ICD-10-CM

## 2021-03-30 DIAGNOSIS — I44.7 LEFT BUNDLE BRANCH BLOCK: ICD-10-CM

## 2021-03-30 DIAGNOSIS — K21.9 GASTROESOPHAGEAL REFLUX DISEASE WITHOUT ESOPHAGITIS: ICD-10-CM

## 2021-03-30 LAB
ANISOCYTOSIS: ABNORMAL
BANDED NEUTROPHILS RELATIVE PERCENT: 1 % (ref 0–7)
BASOPHILS ABSOLUTE: 0 K/UL (ref 0–0.2)
BASOPHILS RELATIVE PERCENT: 0 %
EOSINOPHILS ABSOLUTE: 0 K/UL (ref 0–0.6)
EOSINOPHILS RELATIVE PERCENT: 0 %
HCT VFR BLD CALC: 35.8 % (ref 40.5–52.5)
HEMOGLOBIN: 12.3 G/DL (ref 13.5–17.5)
LYMPHOCYTES ABSOLUTE: 1.4 K/UL (ref 1–5.1)
LYMPHOCYTES RELATIVE PERCENT: 18 %
MCH RBC QN AUTO: 30 PG (ref 26–34)
MCHC RBC AUTO-ENTMCNC: 34.2 G/DL (ref 31–36)
MCV RBC AUTO: 87.8 FL (ref 80–100)
MONOCYTES ABSOLUTE: 0.1 K/UL (ref 0–1.3)
MONOCYTES RELATIVE PERCENT: 1 %
NEUTROPHILS ABSOLUTE: 6.4 K/UL (ref 1.7–7.7)
NEUTROPHILS RELATIVE PERCENT: 80 %
PDW BLD-RTO: 18.6 % (ref 12.4–15.4)
PLATELET # BLD: 160 K/UL (ref 135–450)
PLATELET SLIDE REVIEW: ADEQUATE
PMV BLD AUTO: 8.1 FL (ref 5–10.5)
RBC # BLD: 4.08 M/UL (ref 4.2–5.9)
SLIDE REVIEW: ABNORMAL
SMUDGE CELLS: PRESENT
WBC # BLD: 7.9 K/UL (ref 4–11)

## 2021-03-30 PROCEDURE — 99215 OFFICE O/P EST HI 40 MIN: CPT | Performed by: INTERNAL MEDICINE

## 2021-03-30 RX ORDER — PANTOPRAZOLE SODIUM 40 MG/1
40 TABLET, DELAYED RELEASE ORAL
Qty: 90 TABLET | Refills: 3 | Status: SHIPPED | OUTPATIENT
Start: 2021-03-30 | End: 2022-04-11

## 2021-03-30 RX ORDER — PREDNISONE 20 MG/1
5 TABLET ORAL DAILY
COMMUNITY

## 2021-03-30 RX ORDER — SACUBITRIL AND VALSARTAN 97; 103 MG/1; MG/1
1 TABLET, FILM COATED ORAL 2 TIMES DAILY
Qty: 60 TABLET | Refills: 11 | Status: SHIPPED | OUTPATIENT
Start: 2021-03-30 | End: 2022-06-03

## 2021-03-30 NOTE — LETTER
[address] [facility] - [address]  Phone: [phone]  Fax: [phone]    Jose M Batista MD        March 30, 2021    [facility]  [address] [place] [id number]      To Whom It May Concern,     Ewelinavanesa Syeda KKVDB 1965 is to remain off work until 05/03/2021    If you have any questions or concerns, please don't hesitate to call.     Sincerely,        Jose M Batista MD

## 2021-03-30 NOTE — LETTER
[address]  Advanced CHF/Pulmonary Hypertension   Cardiac Evaluation      Lacy Sandra  YOB: 1965    Date of Visit:  3/30/21      Chief Complaint   Patient presents with    Follow-up    Congestive Heart Failure          History of Present Illness: Ashley Sharif is a 60 y.o. male who presents from referral from Dr Darius Wilks for consultation and management of advanced heart failure. He presented to hospital on 10/12/20 with complaints of severe SOB, BLE edema and chest pain. Echo from 10/13/20 demonstrated EF 20-25%, moderate biatrial enlargement, mild TR and VA. His LHC/RHC 10/14/20 demonstrated severe nonischemic cardiomyopathy, no significant CAD. His cardiac MRI from 12/04/20 showed NICM concerning for inflammatory or infiltrative disease with biventricular involvement. His PET scan from 12/11/20 showed abnormal FDG uptake in the lateral wall of the left ventricle and the right atrium consistent with active myocardial inflammation. Severe global HK and severely impaired LV function at 17.6%. His CT chest from 12/15/20 showed the heart is enlarged with a small pericardial effusion. He is going to see  pulmonology soon. Dr Darius Wilks has not had [facility] yet [facility] He reports he feels well overall. He has SOB on exertion and BLE edema which has been slowly improving. He reports he had increased fatigue for the last 1-2 years. He denies CP, palpitations, dizziness or syncope. He takes little naps during the day. He has been sleeping in his chair. He reports he was waking up feeling smothery. He has not slept in a bed since 09/2020. At his OV on 12/22/20, his lisinopril was stopped. He was started on Entresto 1/2 tab of 49/51 mg twice daily and digoxin 125 mcg daily. He reported he was sleeping when he experienced VT on 12/13/2020.  He had a 10.5 second episode of monomorphic VT with a 360 msec cycle length at 1:24PM. He states he woke up and his Lifevest showed an orange screen that said \"patient respond\", he pushed the button and went back to sleep. On 01/05/21 he reported DR Cass Hidalgo said he was 70% positive for sarcoidosis. He has been taking prednisone 40 mg daily. DR Cass Hidalgo started methotrexate. He reported he did not have any more alarms from his Life Vest. He is tolerating the entresto and digoxin well. He reports he feels better and has more energy. His weight is down almost 20 pounds. His glucose is elevated. He was started on spironolactone 25 mg M,W,F and his lasix was adjusted to 20 mg on M,W,F and 40 mg all other days at this OV. His limited echo from 01/19/21 showed his EF was 25%. On 02/16/21 he reported DR Cass Hidalgo decreased his prednisone to 30 mg then in 1 month go to 20 mg daily. His SBP has been controlled at home in the 110-120s. He is taking entresto 1/2 tab 49/51 mg twice daily. He takes spironolactone 3 days a week. At this OV his entresto was increased to 1/2 tablet 49/51 mg in am and full tablet 49/51 mg in pm.   His muga scan from 03/24/21 his EF was 42.9%. Life Vest off 03/25/21. Today he reports he feels great. He is sending the Life Vest back today. He denies CP, palpitations, SOB, dizziness or syncope. He is on prednisone 20 mg daily. On methotrexate 2.5 mg four days a week. He tried to test himself with carrying 100 lbs of soil and was able to carrying it pretty far before needing to set it down.          No Known Allergies  Current Outpatient Medications   Medication Sig Dispense Refill    predniSONE (DELTASONE) 20 MG tablet Take 20 mg by mouth daily      aspirin 81 MG chewable tablet CHEW ONE TABLET BY MOUTH DAILY 90 tablet 3    methotrexate (RHEUMATREX) 2.5 MG chemo tablet Take by mouth 4 days weekly      folic acid (FOLVITE) 1 MG tablet Take 1 mg by mouth daily      Multiple Vitamins-Minerals (THERAPEUTIC MULTIVITAMIN-MINERALS) tablet Take 1 tablet by mouth daily      spironolactone (ALDACTONE) 25 MG tablet 25 mg on M,W,F 30 tablet 5    sacubitril-valsartan (ENTRESTO) 49-51 MG per tablet Take 1 tablet by mouth 2 times daily (Patient taking differently: Take by mouth 1/2 tab am, 1 tab pm) 60 tablet 5    metoprolol succinate (TOPROL XL) 50 MG extended release tablet Take 1 tablet by mouth daily 30 tablet 5    digoxin (LANOXIN) 125 MCG tablet Take 1 tablet by mouth daily 30 tablet 11    furosemide (LASIX) 40 MG tablet Take 1 tablet by mouth daily (Patient taking differently: Take by mouth 40mg daily on Tuesday & Saturday, then 20mg daily all other days) 30 tablet 5    Blood Glucose Monitoring Suppl ([employer] BLOOD GLUCOSE KIT) KIT 1 each by Does not apply route daily 1 kit 0     No current facility-administered medications for this visit. History reviewed. No pertinent past medical history. History reviewed. No pertinent surgical history.   Family History   Problem Relation Age of Onset    Cancer Mother         skin     Cancer Father         skin, prostate     Cancer Sister         skin, Melanoma     Social History     Socioeconomic History    Marital status:      Spouse name: Not on file    Number of children: Not on file    Years of education: Not on file    Highest education level: Not on file   Occupational History    Not on file   Social Needs    Financial resource strain: Not on file    Food insecurity     Worry: Not on file     Inability: Not on file    Transportation needs     Medical: Not on file     Non-medical: Not on file   Tobacco Use    Smoking status: Never Smoker    Smokeless tobacco: Never Used   Substance and Sexual Activity    Alcohol use: Yes     Comment: rarely    Drug use: No    Sexual activity: Not on file   Lifestyle    Physical activity     Days per week: Not on file     Minutes per session: Not on file    Stress: Not on file   Relationships    Social connections     Talks on phone: Not on file     Gets together: Not on file     Attends Hoahaoism service: Not on file Active member of club or organization: Not on file     Attends meetings of clubs or organizations: Not on file     Relationship status: Not on file    Intimate partner violence     Fear of current or ex partner: Not on file     Emotionally abused: Not on file     Physically abused: Not on file     Forced sexual activity: Not on file   Other Topics Concern    Not on file   Social History Narrative    Not on file       Review of Systems:   · Constitutional: there has been no unanticipated weight loss. There's been no change in energy level, sleep pattern, or activity level. · Eyes: No visual changes or diplopia. No scleral icterus. · ENT: No Headaches, hearing loss or vertigo. No mouth sores or sore throat. · Cardiovascular: Reviewed in HPI  · Respiratory: No cough or wheezing, no sputum production. No hematemesis. · Gastrointestinal: No abdominal pain, appetite loss, blood in stools. No change in bowel or bladder habits. · Genitourinary: No dysuria, trouble voiding, or hematuria. · Musculoskeletal:  No gait disturbance, weakness or joint complaints. · Integumentary: No rash or pruritis. · Neurological: No headache, diplopia, change in muscle strength, numbness or tingling. No change in gait, balance, coordination, mood, affect, memory, mentation, behavior. · Psychiatric: No anxiety, no depression. · Endocrine: No malaise, fatigue or temperature intolerance. No excessive thirst, fluid intake, or urination. No tremor. · Hematologic/Lymphatic: No abnormal bruising or bleeding, blood clots or swollen lymph nodes. · Allergic/Immunologic: No nasal congestion or hives. Physical Examination:    Vitals:    03/30/21 1203   BP: 122/74   Pulse: 52   SpO2: 97%   Weight: 190 lb (86.2 kg)   Height: 5' 10\" (1.778 m)     Body mass index is 27.26 kg/m².      Wt Readings from Last 3 Encounters:   03/30/21 190 lb (86.2 kg)   02/16/21 182 lb (82.6 kg)   01/19/21 178 lb (80.7 kg)     BP Readings from Last 3 <20%, severe global hypokinesis. No MR, No AS        RIGHT HEART CATH  RA: 6   RV: 33/6   PA: 33/15   and mean of  22  PCWP: 15     Sats: on RA  Ao: 89%  RA:  59%  PA: 61%     CO/CI: 4.5/2.2 (stephanie)  SVR/PVR: 1272/122        Assessment  1. Severe nonischemic cardiomyopathy. LVEF less than 20%                -We will start secondary work-up                - will consider LifeVest  2. No significant CAD. Luminal calcification  3.  Relatively normal mildly elevated intracardiac filling pressures. MRI card: 12/4/20  IMPRESSION: Findings are most consistent with non-ischemic cardiomyopathy concerning for inflammatory or infiltrative disease with biventricular involvement. Differential includes subacute myocarditis or an infiltrative process such as cardiac sarcoidosis. Recommend FDG PET for further evaluation for sarcoidosis. There is four chamber dilation with severely reduced biventricular systolic function. There is evidence of decompensated heart failure. PET: 12/11/20  IMPRESSION: 1. Abnormal FDG uptake in the lateral wall of the left ventricle, and the right atrium consistent with active myocardial inflammation. 2. Severely impaired left ventricular function with severe global hypokinesis. 3. Normal resting left ventricular perfusion. 4. Moderate bilateral pleural effusion. CT chest: 12/15/20  FINDINGS: Mediastinum: The heart is enlarged with a small pericardial effusion. Coronary artery calcifications are a marker of atherosclerosis. Scattered mediastinal lymph nodes are not pathologic by CT criteria. The lizbeth not be evaluated in the absence of intravenous contrast.  Lungs/pleura: The tracheobronchial tree is patent. There is no pneumothorax. There small to moderate bilateral pleural effusions with atelectasis, right greater than left. There is biapical scarring. There is mild bilateral interstitial thickening within the bases likely due to interstitial edema.   Upper Abdomen: Images of the upper abdomen are unremarkable. Soft Tissues/Bones: Degenerative changes involve the thoracic spine. Limited echo: 01/19/21  Summary   Limited exam for LVEF. The left ventricular systolic function is moderate to severely reduced with   an ejection fraction of 25%. Global hypokinesis. Muga: 03/24/21  MUGA Conclusions  Abnormal resting left ventricular function with EF= 42.9% . Unable to  determine wall motion on current images. Labs were reviewed including labs from other hospital systems through [facility]. Cardiac testing was reviewed including echos, nuclear scans, cardiac catheterization, including from other hospital systems through [facility]. Assessment:    1. Systolic CHF, chronic ([facility] [address]    2. Non-ischemic cardiomyopathy ([facility] [address]    3. Essential hypertension    4. Left bundle branch block    5. Cardiac sarcoidosis ([facility] [address]    6. Gastroesophageal reflux disease without esophagitis    7. Palpitations          Muga scan on 03/24/21 showed EF was 43%. Life Vest discontinued. Plan:  1. CRP, sed rate, CMP, CBC and BNP in 1 month  2. Increase entresto to 97/103 mg a half tablet twice daily  3. Start pantoprazole 40 mg daily due to his chronic prednisone usage  4. 2 week cardiac monitor for palpitations or arrhythmias given his cardiac sarcoidosis  5. Follow up in 2 months    Scribe's attestation: This note was scribed in the presence of Giovanna Hanson M.D. By Regino Herr RN    The scribe's documentation has been prepared under my direction and personally reviewed by me in its entirety. I confirm that the note above accurately reflects all work, treatment, procedures, and medical decision making performed by me. Time Based Itemization  A total of 40 minutes was spent on today's patient encounter.   If applicable, non-patient-facing activities:  ( x)Preparing to see the patient and reviewing records  ( ) Individual interpretation of results  ( ) Discussion or coordination of care with other health care professionals  ( x) Ordering of unique tests, medications, or procedures  ( x) Documentation within the EHR      I appreciate the opportunity of cooperating in the care of this patient.     Amanda Araiza M.D., [facility] [place]

## 2021-03-30 NOTE — TELEPHONE ENCOUNTER
Monitor placed by Ira Lowe [facility]  Length of monitor 14 day  Monitor ordered by OLMAN CAMPBELL  Serial number [id number]  Kit ID [id number]  Activation successful prior to pt leaving office?  Yes

## 2021-04-05 ENCOUNTER — TELEPHONE (OUTPATIENT)
Dept: CARDIOLOGY CLINIC | Age: 56
End: 2021-04-05

## 2021-04-16 PROCEDURE — 93272 ECG/REVIEW INTERPRET ONLY: CPT | Performed by: INTERNAL MEDICINE

## 2021-04-21 DIAGNOSIS — R00.2 PALPITATIONS: ICD-10-CM

## 2021-04-30 ENCOUNTER — HOSPITAL ENCOUNTER (OUTPATIENT)
Age: 56
Discharge: HOME | End: 2021-04-30
Payer: COMMERCIAL

## 2021-04-30 DIAGNOSIS — I50.22 SYSTOLIC CHF, CHRONIC (HCC): ICD-10-CM

## 2021-04-30 DIAGNOSIS — D86.85 CARDIAC SARCOIDOSIS (HCC): ICD-10-CM

## 2021-04-30 LAB
A/G RATIO: 2.1 (ref 1.1–2.2)
ALBUMIN SERPL-MCNC: 4.6 G/DL (ref 3.4–5)
ALP BLD-CCNC: 54 U/L (ref 40–129)
ALT SERPL-CCNC: 58 U/L (ref 10–40)
ANION GAP SERPL CALCULATED.3IONS-SCNC: 17 MMOL/L (ref 3–16)
AST SERPL-CCNC: 37 U/L (ref 15–37)
BASOPHILS ABSOLUTE: 0 K/UL (ref 0–0.2)
BASOPHILS RELATIVE PERCENT: 0.6 %
BILIRUB SERPL-MCNC: 0.4 MG/DL (ref 0–1)
BUN BLDV-MCNC: 19 MG/DL (ref 7–20)
C-REACTIVE PROTEIN: <3 MG/L (ref 0–5.1)
CALCIUM SERPL-MCNC: 9.3 MG/DL (ref 8.3–10.6)
CHLORIDE BLD-SCNC: 97 MMOL/L (ref 99–110)
CO2: 22 MMOL/L (ref 21–32)
CREAT SERPL-MCNC: 1 MG/DL (ref 0.9–1.3)
EOSINOPHILS ABSOLUTE: 0 K/UL (ref 0–0.6)
EOSINOPHILS RELATIVE PERCENT: 0.1 %
GFR AFRICAN AMERICAN: >60
GFR NON-AFRICAN AMERICAN: >60
GLOBULIN: 2.2 G/DL
GLUCOSE BLD-MCNC: 158 MG/DL (ref 70–99)
HCT VFR BLD CALC: 36 % (ref 40.5–52.5)
HEMOGLOBIN: 12.6 G/DL (ref 13.5–17.5)
LYMPHOCYTES ABSOLUTE: 0.8 K/UL (ref 1–5.1)
LYMPHOCYTES RELATIVE PERCENT: 11.4 %
MCH RBC QN AUTO: 32 PG (ref 26–34)
MCHC RBC AUTO-ENTMCNC: 34.9 G/DL (ref 31–36)
MCV RBC AUTO: 91.5 FL (ref 80–100)
MONOCYTES ABSOLUTE: 0.3 K/UL (ref 0–1.3)
MONOCYTES RELATIVE PERCENT: 4.5 %
NEUTROPHILS ABSOLUTE: 5.5 K/UL (ref 1.7–7.7)
NEUTROPHILS RELATIVE PERCENT: 83.4 %
PDW BLD-RTO: 14.6 % (ref 12.4–15.4)
PLATELET # BLD: 192 K/UL (ref 135–450)
PMV BLD AUTO: 8.2 FL (ref 5–10.5)
POTASSIUM SERPL-SCNC: 4.4 MMOL/L (ref 3.5–5.1)
PRO-BNP: 108 PG/ML (ref 0–124)
RBC # BLD: 3.93 M/UL (ref 4.2–5.9)
SEDIMENTATION RATE, ERYTHROCYTE: 14 MM/HR (ref 0–20)
SODIUM BLD-SCNC: 136 MMOL/L (ref 136–145)
TOTAL PROTEIN: 6.8 G/DL (ref 6.4–8.2)
WBC # BLD: 6.6 K/UL (ref 4–11)

## 2021-04-30 PROCEDURE — 83880 ASSAY OF NATRIURETIC PEPTIDE: CPT

## 2021-04-30 PROCEDURE — 85652 RBC SED RATE AUTOMATED: CPT

## 2021-04-30 PROCEDURE — 85025 COMPLETE CBC W/AUTO DIFF WBC: CPT

## 2021-04-30 PROCEDURE — 80053 COMPREHEN METABOLIC PANEL: CPT

## 2021-04-30 PROCEDURE — 36415 COLL VENOUS BLD VENIPUNCTURE: CPT

## 2021-04-30 PROCEDURE — 86140 C-REACTIVE PROTEIN: CPT

## 2021-05-10 NOTE — PROGRESS NOTES
[address]  Advanced CHF/Pulmonary Hypertension   Cardiac Evaluation      Baron Sandra  YOB: 1965    Date of Visit:  5/11/21      Chief Complaint   Patient presents with    Follow-up    Congestive Heart Failure         History of Present Illness: Walter Mishra is a 60 y.o. male who presents from referral from Dr Ricky Ayala for consultation and management of advanced heart failure. He presented to hospital on 10/12/20 with complaints of severe SOB, BLE edema and chest pain. Echo from 10/13/20 demonstrated EF 20-25%, moderate biatrial enlargement, mild TR and ME. His LHC/RHC 10/14/20 demonstrated severe nonischemic cardiomyopathy, no significant CAD. His cardiac MRI from 12/04/20 showed NICM concerning for inflammatory or infiltrative disease with biventricular involvement. His PET scan from 12/11/20 showed abnormal FDG uptake in the lateral wall of the left ventricle and the right atrium consistent with active myocardial inflammation. Severe global HK and severely impaired LV function at 17.6%. His CT chest from 12/15/20 showed the heart is enlarged with a small pericardial effusion. He is going to see  pulmonology soon. Dr Ricky Ayala has not had genetic testing yet but [facility] He reports he feels well overall. He has SOB on exertion and BLE edema which has been slowly improving. He reports he had increased fatigue for the last 1-2 years. He denies CP, palpitations, dizziness or syncope. He takes little naps during the day. He has been sleeping in his chair. He reports he was waking up feeling smothery. He has not slept in a bed since 09/2020. At his OV on 12/22/20, his lisinopril was stopped. He was started on Entresto 1/2 tab of 49/51 mg twice daily and digoxin 125 mcg daily. He reported he was sleeping when he experienced VT on 12/13/2020.  He had a 10.5 second episode of monomorphic VT with a 360 msec cycle length at 1:24PM. He states he woke up and his Lifevest showed an orange screen that said \"patient respond\", he pushed the button and went back to sleep. On 01/05/21 he reported DR Candace Sierra said he was 70% positive for sarcoidosis. He has been taking prednisone 40 mg daily. DR Candace Sierra started methotrexate. He reported he did not have any more alarms from his Life Vest. He is tolerating the entresto and digoxin well. He reports he feels better and has more energy. His weight is down almost 20 pounds. His glucose is elevated. He was started on spironolactone 25 mg M,W,F and his lasix was adjusted to 20 mg on M,W,F and 40 mg all other days at this OV. His limited echo from 01/19/21 showed his EF was 25%. On 02/16/21 he reported DR Candace Sierra decreased his prednisone to 30 mg then in 1 month go to 20 mg daily. His SBP has been controlled at home in the 110-120s. He is taking entresto 1/2 tab 49/51 mg twice daily. He takes spironolactone 3 days a week. At this OV his entresto was increased to 1/2 tablet 49/51 mg in am and full tablet 49/51 mg in pm.   His muga scan from 03/24/21 his EF was 42.9%. Life Vest off 03/25/21. His cardiac monitor from 04/2021 showed moderate PVC burden of 11.49%. Today he reports he is on prednisone 20 mg daily. He reports he has been exercising. He reports dizziness and slight chest \"oddness\" that resolves at rest. He reports he has controlled BP with the highest at 140/85. His HR has been above 100 but drops to 70 at rest. He denies BLE edema, SOB, palpitations, or syncope.            No Known Allergies  Current Outpatient Medications   Medication Sig Dispense Refill    predniSONE (DELTASONE) 20 MG tablet Take 20 mg by mouth daily      sacubitril-valsartan (ENTRESTO)  MG per tablet Take 1 tablet by mouth 2 times daily (Patient taking differently: Take 1 tablet by mouth 1/2 tab BID) 60 tablet 11    pantoprazole (PROTONIX) 40 MG tablet Take 1 tablet by mouth every morning (before breakfast) 90 tablet 3    aspirin 81 MG chewable tablet CHEW ONE TABLET BY MOUTH DAILY 90 tablet 3    methotrexate (RHEUMATREX) 2.5 MG chemo tablet Take by mouth 4 days weekly      folic acid (FOLVITE) 1 MG tablet Take 1 mg by mouth daily      Multiple Vitamins-Minerals (THERAPEUTIC MULTIVITAMIN-MINERALS) tablet Take 1 tablet by mouth daily      spironolactone (ALDACTONE) 25 MG tablet 25 mg on M,W,F 30 tablet 5    metoprolol succinate (TOPROL XL) 50 MG extended release tablet Take 1 tablet by mouth daily (Patient taking differently: Take 50 mg by mouth 1/2 tab BID) 30 tablet 5    digoxin (LANOXIN) 125 MCG tablet Take 1 tablet by mouth daily 30 tablet 11    furosemide (LASIX) 40 MG tablet Take 1 tablet by mouth daily (Patient taking differently: Take by mouth 40mg daily on Tuesday & Saturday, then 20mg daily all other days) 30 tablet 5    Blood Glucose Monitoring Suppl (aihuishou BLOOD GLUCOSE KIT) KIT 1 each by Does not apply route daily 1 kit 0     No current facility-administered medications for this visit. History reviewed. No pertinent past medical history. History reviewed. No pertinent surgical history.   Family History   Problem Relation Age of Onset    Cancer Mother         skin     Cancer Father         skin, prostate     Cancer Sister         skin, Melanoma     Social History     Socioeconomic History    Marital status:      Spouse name: Not on file    Number of children: Not on file    Years of education: Not on file    Highest education level: Not on file   Occupational History    Not on file   Social Needs    Financial resource strain: Not on file    Food insecurity     Worry: Not on file     Inability: Not on file    Transportation needs     Medical: Not on file     Non-medical: Not on file   Tobacco Use    Smoking status: Never Smoker    Smokeless tobacco: Never Used   Substance and Sexual Activity    Alcohol use: Yes     Comment: rarely    Drug use: No    Sexual activity: Not on file   Lifestyle    Physical activity     Days per week: Not on file     Minutes per session: Not on file    Stress: Not on file   Relationships    Social connections     Talks on phone: Not on file     Gets together: Not on file     Attends Hoahaoism service: Not on file     Active member of club or organization: Not on file     Attends meetings of clubs or organizations: Not on file     Relationship status: Not on file    Intimate partner violence     Fear of current or ex partner: Not on file     Emotionally abused: Not on file     Physically abused: Not on file     Forced sexual activity: Not on file   Other Topics Concern    Not on file   Social History Narrative    Not on file       Review of Systems:   · Constitutional: there has been no unanticipated weight loss. There's been no change in energy level, sleep pattern, or activity level. · Eyes: No visual changes or diplopia. No scleral icterus. · ENT: No Headaches, hearing loss or vertigo. No mouth sores or sore throat. · Cardiovascular: Reviewed in HPI  · Respiratory: No cough or wheezing, no sputum production. No hematemesis. · Gastrointestinal: No abdominal pain, appetite loss, blood in stools. No change in bowel or bladder habits. · Genitourinary: No dysuria, trouble voiding, or hematuria. · Musculoskeletal:  No gait disturbance, weakness or joint complaints. · Integumentary: No rash or pruritis. · Neurological: No headache, diplopia, change in muscle strength, numbness or tingling. No change in gait, balance, coordination, mood, affect, memory, mentation, behavior. · Psychiatric: No anxiety, no depression. · Endocrine: No malaise, fatigue or temperature intolerance. No excessive thirst, fluid intake, or urination. No tremor. · Hematologic/Lymphatic: No abnormal bruising or bleeding, blood clots or swollen lymph nodes. · Allergic/Immunologic: No nasal congestion or hives.     Physical Examination:    Vitals:    05/11/21 1130   BP: 132/80   Pulse: 83   SpO2: 98% Weight: 189 lb (85.7 kg)   Height: 5' 10\" (1.778 m)     Body mass index is 27.12 kg/m². Wt Readings from Last 3 Encounters:   05/11/21 189 lb (85.7 kg)   03/30/21 190 lb (86.2 kg)   02/16/21 182 lb (82.6 kg)     BP Readings from Last 3 Encounters:   05/11/21 132/80   03/30/21 122/74   02/16/21 114/72            Constitutional and General Appearance:   WD/WN in NAD  HEENT:  NC/AT  TAMIE  No problems with hearing  Skin:  Warm, dry  Respiratory:  · Normal excursion and expansion without use of accessory muscles  · Resp Auscultation: Normal breath sounds without dullness  Cardiovascular:  · The apical impulses not displaced  · Heart tones are crisp and normal  · Cervical veins are not engorged  · The carotid upstroke is normal in amplitude and contour without delay or bruit  · JVP less than 8 cm H2O  RRR with nl S1 and S2 without m,r,g  · Peripheral pulses are symmetrical and full  · There is no clubbing, cyanosis of the extremities. · No edema  · Femoral Arteries: 2+ and equal  · Pedal Pulses: 2+ and equal   Neck:  · No thyromegaly  Abdomen:  · No masses or tenderness  · Liver/Spleen: No Abnormalities Noted  Neurological/Psychiatric:  · Alert and oriented in all spheres  · Moves all extremities well  · Exhibits normal gait balance and coordination  · No abnormalities of mood, affect, memory, mentation, or behavior are noted    Echo: 10/13/20  The left ventricular systolic function is severely reduced with an ejection   fraction of 20 - 25 %. There is hypokinesis of the apex, apical lateral, apical septum, anterior,   inferior, anteroseptum and apical anterior walls. Left ventricular cavity size is mildly dilated. Left ventricular diastolic filling pressure is elevated. Moderate mitral regurgitation. Mild Bi-atrial enlargement. The right ventricle is mildly enlarged. Right ventricular systolic function is mildly reduced . Mild tricuspid and pulmonic regurgitation.    There is a left pleural effusion. Cardiac cath: 10/14/20  Findings:      LEFT HEART CATH  LM: ostial pinch (MLA by IVUS was >30)  LAD: luminals, luminals calcification  Ramus: luminals  LCX: Luminals  RCA: dominant, sluggish flow. KEVIN-2 flow. LVEDP: 15  LVEF: <20%, severe global hypokinesis. No MR, No AS        RIGHT HEART CATH  RA: 6   RV: 33/6   PA: 33/15   and mean of  22  PCWP: 15     Sats: on RA  Ao: 89%  RA:  59%  PA: 61%     CO/CI: 4.5/2.2 (stephanie)  SVR/PVR: 1272/122        Assessment  1. Severe nonischemic cardiomyopathy. LVEF less than 20%                -We will start secondary work-up                - will consider LifeVest  2. No significant CAD. Luminal calcification  3.  Relatively normal mildly elevated intracardiac filling pressures. MRI card: 12/4/20  IMPRESSION: Findings are most consistent with non-ischemic cardiomyopathy concerning for inflammatory or infiltrative disease with biventricular involvement. Differential includes subacute myocarditis or an infiltrative process such as cardiac sarcoidosis. Recommend FDG PET for further evaluation for sarcoidosis. There is four chamber dilation with severely reduced biventricular systolic function. There is evidence of decompensated heart failure. PET: 12/11/20  IMPRESSION: 1. Abnormal FDG uptake in the lateral wall of the left ventricle, and the right atrium consistent with active myocardial inflammation. 2. Severely impaired left ventricular function with severe global hypokinesis. 3. Normal resting left ventricular perfusion. 4. Moderate bilateral pleural effusion. CT chest: 12/15/20  FINDINGS: Mediastinum: The heart is enlarged with a small pericardial effusion. Coronary artery calcifications are a marker of atherosclerosis. Scattered mediastinal lymph nodes are not pathologic by CT criteria. The lizbeth not be evaluated in the absence of intravenous contrast.  Lungs/pleura: The tracheobronchial tree is patent.   There is no pneumothorax. There small to moderate bilateral pleural effusions with atelectasis, right greater than left. There is biapical scarring. There is mild bilateral interstitial thickening within the bases likely due to interstitial edema. Upper Abdomen: Images of the upper abdomen are unremarkable. Soft Tissues/Bones: Degenerative changes involve the thoracic spine. Limited echo: 01/19/21  Summary   Limited exam for LVEF. The left ventricular systolic function is moderate to severely reduced with   an ejection fraction of 25%. Global hypokinesis. Muga: 03/24/21  MUGA Conclusions  Abnormal resting left ventricular function with EF= 42.9% . Unable to  determine wall motion on current images. Life Vest discontinued. Labs were reviewed including labs from other hospital systems through [facility]. Cardiac testing was reviewed including echos, nuclear scans, cardiac catheterization, including from other hospital systems through [facility]. Assessment:    1. Systolic CHF, chronic ([address]    2. Non-ischemic cardiomyopathy ([address]    3. Essential hypertension    4. Cardiac sarcoidosis ([address]    5. SOB (shortness of breath)    6. NSVT (nonsustained ventricular tachycardia) ([address]            Plan:  1. Appointment with Dr Mayra Garcia due to PVC burden 11.49%  2. Recommend starting walking regimen  3. Ask DR Nicki Richardson if he believes a repeat PET scan could be helpful  4. CBC, BMP and BNP before next visit  5. Follow up in 3 months      Scribe's attestation: This note was scribed in the presence of Cleveland Reid M.D. By Amber Dominguez RN    The scribe's documentation has been prepared under my direction and personally reviewed by me in its entirety. I confirm that the note above accurately reflects all work, treatment, procedures, and medical decision making performed by me. Time Based Itemization  A total of 30 minutes was spent on today's patient encounter.   If applicable, non-patient-facing activities:  ( x)Preparing to see the patient and reviewing records  ( ) Individual interpretation of results  ( ) Discussion or coordination of care with other health care professionals  ( x) Ordering of unique tests, medications, or procedures  ( x) Documentation within the EHR         I appreciate the opportunity of cooperating in the care of this patient.     Earnestine Reynolds M.D., [facility] [place]

## 2021-05-11 ENCOUNTER — HOSPITAL ENCOUNTER (OUTPATIENT)
Age: 56
Discharge: HOME | End: 2021-05-11
Payer: COMMERCIAL

## 2021-05-11 ENCOUNTER — OFFICE VISIT (OUTPATIENT)
Dept: CARDIOLOGY CLINIC | Age: 56
End: 2021-05-11
Payer: COMMERCIAL

## 2021-05-11 VITALS
SYSTOLIC BLOOD PRESSURE: 132 MMHG | BODY MASS INDEX: 27.06 KG/M2 | HEART RATE: 83 BPM | OXYGEN SATURATION: 98 % | HEIGHT: 70 IN | DIASTOLIC BLOOD PRESSURE: 80 MMHG | WEIGHT: 189 LBS

## 2021-05-11 DIAGNOSIS — I10 ESSENTIAL HYPERTENSION: Primary | ICD-10-CM

## 2021-05-11 DIAGNOSIS — E11.9 TYPE 2 DIABETES MELLITUS WITHOUT COMPLICATION, WITHOUT LONG-TERM CURRENT USE OF INSULIN (HCC): ICD-10-CM

## 2021-05-11 DIAGNOSIS — I10 ESSENTIAL HYPERTENSION: ICD-10-CM

## 2021-05-11 DIAGNOSIS — I42.8 NON-ISCHEMIC CARDIOMYOPATHY (HCC): ICD-10-CM

## 2021-05-11 DIAGNOSIS — I47.29 NSVT (NONSUSTAINED VENTRICULAR TACHYCARDIA): ICD-10-CM

## 2021-05-11 DIAGNOSIS — I50.22 SYSTOLIC CHF, CHRONIC (HCC): Primary | ICD-10-CM

## 2021-05-11 DIAGNOSIS — Z12.5 SPECIAL SCREENING FOR MALIGNANT NEOPLASM OF PROSTATE: ICD-10-CM

## 2021-05-11 DIAGNOSIS — R06.02 SOB (SHORTNESS OF BREATH): ICD-10-CM

## 2021-05-11 DIAGNOSIS — D86.85 CARDIAC SARCOIDOSIS (HCC): ICD-10-CM

## 2021-05-11 LAB
A/G RATIO: 2 (ref 1.1–2.2)
ALBUMIN SERPL-MCNC: 4.4 G/DL (ref 3.4–5)
ALP BLD-CCNC: 49 U/L (ref 40–129)
ALT SERPL-CCNC: 59 U/L (ref 10–40)
ANION GAP SERPL CALCULATED.3IONS-SCNC: 12 MMOL/L (ref 3–16)
AST SERPL-CCNC: 26 U/L (ref 15–37)
BILIRUB SERPL-MCNC: 0.3 MG/DL (ref 0–1)
BUN BLDV-MCNC: 24 MG/DL (ref 7–20)
CALCIUM SERPL-MCNC: 9.3 MG/DL (ref 8.3–10.6)
CHLORIDE BLD-SCNC: 99 MMOL/L (ref 99–110)
CHOLESTEROL, TOTAL: 298 MG/DL (ref 0–199)
CO2: 28 MMOL/L (ref 21–32)
CREAT SERPL-MCNC: 1 MG/DL (ref 0.9–1.3)
GFR AFRICAN AMERICAN: >60
GFR NON-AFRICAN AMERICAN: >60
GLOBULIN: 2.2 G/DL
GLUCOSE BLD-MCNC: 129 MG/DL (ref 70–99)
HDLC SERPL-MCNC: 31 MG/DL (ref 40–60)
LDL CHOLESTEROL CALCULATED: ABNORMAL MG/DL
LDL CHOLESTEROL DIRECT: 108 MG/DL
POTASSIUM SERPL-SCNC: 3.9 MMOL/L (ref 3.5–5.1)
PROSTATE SPECIFIC ANTIGEN: 1.37 NG/ML (ref 0–4)
SODIUM BLD-SCNC: 139 MMOL/L (ref 136–145)
TOTAL PROTEIN: 6.6 G/DL (ref 6.4–8.2)
TRIGL SERPL-MCNC: 1050 MG/DL (ref 0–150)
VLDLC SERPL CALC-MCNC: ABNORMAL MG/DL

## 2021-05-11 PROCEDURE — 99214 OFFICE O/P EST MOD 30 MIN: CPT | Performed by: INTERNAL MEDICINE

## 2021-05-11 PROCEDURE — 80061 LIPID PANEL: CPT

## 2021-05-11 PROCEDURE — 80053 COMPREHEN METABOLIC PANEL: CPT

## 2021-05-11 PROCEDURE — 36415 COLL VENOUS BLD VENIPUNCTURE: CPT

## 2021-05-11 PROCEDURE — 83721 ASSAY OF BLOOD LIPOPROTEIN: CPT

## 2021-05-11 PROCEDURE — 83036 HEMOGLOBIN GLYCOSYLATED A1C: CPT

## 2021-05-11 PROCEDURE — 84153 ASSAY OF PSA TOTAL: CPT

## 2021-05-11 NOTE — PATIENT INSTRUCTIONS
Plan:  1. Appointment with Dr Amelia Brooks due to PVC burden 11.49%  2. Recommend starting walking regimen  3. Ask DR Ledy Kauffman if he believes a PET scan could be helpful  4. CBC, BMP and BNP before next visit  5.  Follow up in 3 months

## 2021-05-12 LAB
ESTIMATED AVERAGE GLUCOSE: 134.1 MG/DL
HBA1C MFR BLD: 6.3 %

## 2021-05-13 ENCOUNTER — OFFICE VISIT (OUTPATIENT)
Dept: INTERNAL MEDICINE CLINIC | Age: 56
End: 2021-05-13
Payer: COMMERCIAL

## 2021-05-13 VITALS
HEIGHT: 70 IN | BODY MASS INDEX: 27.35 KG/M2 | DIASTOLIC BLOOD PRESSURE: 86 MMHG | SYSTOLIC BLOOD PRESSURE: 120 MMHG | WEIGHT: 191 LBS | OXYGEN SATURATION: 98 % | HEART RATE: 64 BPM | TEMPERATURE: 97.3 F

## 2021-05-13 DIAGNOSIS — I50.9 ACUTE HEART FAILURE, UNSPECIFIED HEART FAILURE TYPE (HCC): ICD-10-CM

## 2021-05-13 DIAGNOSIS — Z00.00 WELL ADULT EXAM: Primary | ICD-10-CM

## 2021-05-13 DIAGNOSIS — Z12.11 SCREEN FOR COLON CANCER: ICD-10-CM

## 2021-05-13 DIAGNOSIS — E78.1 HYPERTRIGLYCERIDEMIA: ICD-10-CM

## 2021-05-13 PROBLEM — D86.85 CARDIAC SARCOIDOSIS (HCC): Status: ACTIVE | Noted: 2020-12-30

## 2021-05-13 PROCEDURE — 99396 PREV VISIT EST AGE 40-64: CPT | Performed by: INTERNAL MEDICINE

## 2021-05-13 ASSESSMENT — PATIENT HEALTH QUESTIONNAIRE - PHQ9
SUM OF ALL RESPONSES TO PHQ QUESTIONS 1-9: 0
SUM OF ALL RESPONSES TO PHQ QUESTIONS 1-9: 0
2. FEELING DOWN, DEPRESSED OR HOPELESS: 0
SUM OF ALL RESPONSES TO PHQ QUESTIONS 1-9: 0
1. LITTLE INTEREST OR PLEASURE IN DOING THINGS: 0

## 2021-05-13 NOTE — PROGRESS NOTES
Subjective:      Patient ID: Antelmo Telles is a 60 y.o. male. HPI  Here for a well exam. No health concerns  Past Medical History, Medications, Social History, Family History, Health Maintenance and labs have been reviewed with Efrain Sandra. diagnosed with NSVT, chf. On prednisone for cardiac sarcoiosis    Review of Systems   Constitutional: Negative for unexpected weight change. Respiratory: Negative for cough, chest tightness, shortness of breath and wheezing. Cardiovascular: Negative for chest pain, palpitations and leg swelling. Gastrointestinal: Negative for nausea, vomiting, diarrhea, constipation and abdominal distention. Musculoskeletal: Negative for myalgias, back pain and arthralgias. Neurological: Negative for tremors and numbness. All other systems reviewed and are negative. Objective:   Physical Exam   Constitutional: He is oriented to person, place, and time. He appears well-developed and well-nourished. No distress. HENT:   Right Ear: External ear normal.   Left Ear: External ear normal.   Mouth/Throat: Oropharynx is clear and moist. No oropharyngeal exudate. Neck: Neck supple. No thyromegaly present. Cardiovascular: Normal rate, regular rhythm and normal heart sounds. Pulmonary/Chest: Effort normal and breath sounds normal. No respiratory distress. He has no wheezes. He has no rales. Abdominal: Soft. He exhibits no distension. There is no tenderness. There is no rebound and no guarding. Musculoskeletal: He exhibits no edema. Neurological: He is alert and oriented to person, place, and time. Skin: He is not diaphoretic. Assessment:     Encouraged exercise and healthy diet. F/u with ob/gyn and dentist regularly. Recommend eye exam.  Wears seat belt. Provided rx for fasting labs. Continue medications. Hypertriglyceridemia  Patient is not interested in medication at this time. We will watch diet and try to increase exercise as tolerated.   Likely due to being on prednisone. We will continue to monitor    Cardiac sarcoidosis  Follow-up with pulmonary. Continue prednisone    Congestive heart failure  Compensated. Follow-up with cardiology    Declines Pneumovax. Not interested in Covid vaccine.   Cologuard ordered for patient    Plan:      See above plan

## 2021-06-01 RX ORDER — FUROSEMIDE 40 MG/1
TABLET ORAL
Qty: 30 TABLET | Refills: 2 | Status: SHIPPED | OUTPATIENT
Start: 2021-06-01 | End: 2021-10-11 | Stop reason: SDUPTHER

## 2021-07-06 RX ORDER — METOPROLOL SUCCINATE 50 MG/1
TABLET, EXTENDED RELEASE ORAL
Qty: 90 TABLET | Refills: 3 | Status: SHIPPED | OUTPATIENT
Start: 2021-07-06 | End: 2022-06-29

## 2021-07-29 ENCOUNTER — OFFICE VISIT (OUTPATIENT)
Dept: CARDIOLOGY CLINIC | Age: 56
End: 2021-07-29
Payer: COMMERCIAL

## 2021-07-29 VITALS
HEIGHT: 70 IN | WEIGHT: 189 LBS | BODY MASS INDEX: 27.06 KG/M2 | OXYGEN SATURATION: 97 % | DIASTOLIC BLOOD PRESSURE: 70 MMHG | HEART RATE: 58 BPM | SYSTOLIC BLOOD PRESSURE: 130 MMHG

## 2021-07-29 DIAGNOSIS — I50.9 ACUTE HEART FAILURE, UNSPECIFIED HEART FAILURE TYPE (HCC): ICD-10-CM

## 2021-07-29 DIAGNOSIS — D86.85 CARDIAC SARCOIDOSIS (HCC): ICD-10-CM

## 2021-07-29 DIAGNOSIS — I42.8 NON-ISCHEMIC CARDIOMYOPATHY (HCC): ICD-10-CM

## 2021-07-29 DIAGNOSIS — I44.7 LEFT BUNDLE BRANCH BLOCK: Primary | ICD-10-CM

## 2021-07-29 DIAGNOSIS — I47.29 NSVT (NONSUSTAINED VENTRICULAR TACHYCARDIA): ICD-10-CM

## 2021-07-29 PROCEDURE — 99214 OFFICE O/P EST MOD 30 MIN: CPT | Performed by: INTERNAL MEDICINE

## 2021-07-29 PROCEDURE — 93000 ELECTROCARDIOGRAM COMPLETE: CPT | Performed by: INTERNAL MEDICINE

## 2021-07-29 NOTE — PATIENT INSTRUCTIONS
RECOMMENDATIONS:  1. Discussed risks and benefits of EP study to try to induce VT.   -possible ICD placement based on this study. 2. Discussed risks and benefits of PVC ablation. 3. Discussed risks and benefits of anti arrhythmic medications for PVC suppression:   -amiodarone, sotalol, or dronedarone.   -Patient states that he has been feeling so well lately, he does not want to change anything right now. 4. Follow up with EP NP in 6 months. Patient Education        Electrophysiology Study and Catheter Ablation: Before Your Procedure  What is an electrophysiology study and catheter ablation? An electrophysiology study is a test to see if there is a problem with your heart rhythm and to find out how to fix it. It is also called an EP study. A catheter ablation procedure is sometimes done at the same time. This procedure destroys (ablates) small areas of your heart that are causing your heart rhythm problem. The doctor puts plastic tubes called catheters into blood vessels in your groin, arm, or neck. He or she then uses an X-ray machine to guide long wires through the tubes to your heart. The doctor can use these wires to record your heart's electrical signals. If the doctor thinks your problem can be fixed with ablation, he or she can use the wires to destroy a small part of your heart tissue. This is most often done with radio waves. You will probably be awake during the procedure. But you might be asleep. The doctor will give you medicines to help you feel relaxed and to numb the areas where the catheters go in. An EP study and ablation can take 2 to 6 hours. In rare cases, it can take longer. If you have an EP study only and you don't need more treatment, you may go home the same day. But if you also have ablation, you may stay overnight in the hospital. How long you stay in the hospital depends on the type of ablation you have. Do not exercise hard or lift anything heavy for a week.  You may be able to go back to work and to your normal routine in 1 or 2 days. Follow-up care is a key part of your treatment and safety. Be sure to make and go to all appointments, and call your doctor if you are having problems. It's also a good idea to know your test results and keep a list of the medicines you take. How do you prepare for the procedure? Procedures can be stressful. This information will help you understand what you can expect. And it will help you safely prepare for your procedure. Preparing for the procedure    · Be sure you have someone to take you home. Anesthesia and pain medicine will make it unsafe for you to drive or get home on your own.     · Understand exactly what procedure is planned, along with the risks, benefits, and other options.     · Tell your doctor ALL the medicines, vitamins, supplements, and herbal remedies you take. Some may increase the risk of problems during your procedure. Your doctor will tell you if you should stop taking any of them before the procedure and how soon to do it.     · If you take aspirin or some other blood thinner, ask your doctor if you should stop taking it before your procedure. Make sure that you understand exactly what your doctor wants you to do. These medicines increase the risk of bleeding.     · Make sure your doctor and the hospital have a copy of your advance directive. If you don't have one, you may want to prepare one. It lets others know your health care wishes. It's a good thing to have before any type of surgery or procedure. What happens on the day of the procedure? · Follow the instructions exactly about when to stop eating and drinking. If you don't, your procedure may be canceled. If your doctor told you to take your medicines on the day of the procedure, take them with only a sip of water.     · Take a bath or shower before you come in for your procedure.  Do not apply lotions, perfumes, deodorants, or nail polish.     · Take off all jewelry and piercings. And take out contact lenses, if you wear them. At the hospital or surgery center   · Bring a picture ID.     · You will be kept comfortable and safe by your anesthesia provider. The anesthesia may make you sleep. Or it may just numb the area being worked on.     · This procedure can take 2 to 6 hours. In rare cases, it can take longer.     · After the procedure, pressure will be applied to the area where the catheter was put in your blood vessel. Then the area may be covered with a bandage or a compression device. This will prevent bleeding.     · Nurses will check your heart rate and blood pressure. The nurse will also check the catheter site for bleeding.     · If the catheter was put in your groin, you will need to lie still and keep your leg straight for several hours. The nurse may put a weighted bag on your leg to keep it still.     · If the catheter was put in your arm, you may be able to sit up and get out of bed right away. But you will need to keep your arm still for at least 1 hour.     · You may have a bruise or a small lump where the catheter was put in your blood vessel. This is normal and will go away. When should you call your doctor? · You have questions or concerns.     · You don't understand how to prepare for your procedure.     · You become ill before the procedure (such as fever, flu, or a cold).     · You need to reschedule or have changed your mind about having the procedure. Where can you learn more? Go to https://[employer].tuta.co. org and sign in to your GetYou account. Enter T600 in the [employer] box to learn more about \"Electrophysiology Study and Catheter Ablation: Before Your Procedure. \"     If you do not have an account, please click on the \"Sign Up Now\" link. Current as of: August 31, 2020               Content Version: 12.9  © 9777-1502 Healthwise, Incorporated.    Care instructions adapted under license by [facility]. If you have questions about a medical condition or this instruction, always ask your healthcare professional. [place] [address] any warranty or liability for your use of this information. Patient Education        Electrophysiology Study and Catheter Ablation: What to Expect at [address] [place] [address] Drive had an electrophysiology study for a problem with your heartbeat. You may also have had a catheter ablation to try to correct the problem. You may have swelling, bruising, or a small lump around the site where the catheters went into your body. These should go away in 3 to 4 weeks. Do not exercise hard or lift anything heavy for a week. You may be able to go back to work and to your normal routine in 1 or 2 days. This care sheet gives you a general idea about how long it will take for you to recover. But each person recovers at a different pace. Follow the steps below to get better as quickly as possible. How can you care for yourself at home? Activity    · For 1 week, do not lift anything that would make you strain. This may include heavy grocery bags and milk containers, a heavy briefcase or backpack, cat litter or dog food bags, a vacuum , or a child.     · For 1 week, do not exercise hard or do any activity that could strain your blood vessels or the site where the catheters went into your body.     · Ask your doctor when it is okay to have sex. Diet    · You can eat your normal diet. If your stomach is upset, try bland, low-fat foods like plain rice, broiled chicken, toast, and yogurt.     · Drink plenty of fluids (unless your doctor tells you not to). Medicines    · Your doctor will tell you if and when you can restart your medicines. He or she will also give you instructions about taking any new medicines.     · If you take aspirin or some other blood thinner, ask your doctor if and when to start taking it again.  Make sure that you understand exactly what your doctor wants you to do.     · Ask your doctor if you can take acetaminophen (Tylenol) for pain. Do not take aspirin for 3 days, unless your doctor says it is okay.     · Check with your doctor before you take aspirin or anti-inflammatory medicines to reduce pain and swelling. These include ibuprofen (Advil, Motrin) and naproxen (Aleve).   · Make sure you know which heart medicines to continue and which ones to stop. Ask your doctor if you aren't sure. Catheter site care    · You can remove your bandages the day after the procedure.     · You may shower 24 to 48 hours after the procedure, if your doctor okays it. Pat the incision dry.     · Do not soak the catheter site until it is healed. Don't take a bath for 1 week, or until your doctor tells you it is okay.     · Watch for bleeding from the site. A small amount of blood (up to the size of a quarter) on the bandage can be normal.     · If you are bleeding, lie down and press on the area for 15 minutes to try to make it stop. If the bleeding does not stop, call your doctor or seek immediate medical care. Follow-up care is a key part of your treatment and safety. Be sure to make and go to all appointments, and call your doctor if you are having problems. It's also a good idea to know your test results and keep a list of the medicines you take. When should you call for help? Call 911  anytime you think you may need emergency care. For example, call if:    · You passed out (lost consciousness).     · You have symptoms of a heart attack. These may include:  ? Chest pain or pressure, or a strange feeling in the chest.  ? Sweating. ? Shortness of breath. ? Nausea or vomiting. ? Pain, pressure, or a strange feeling in the back, neck, jaw, or upper belly, or in one or both shoulders or arms. ? Lightheadedness or sudden weakness. ? A fast or irregular heartbeat. After you call 911, the  may tell you to chew 1 adult-strength or 2 to 4 low-dose aspirin. Wait for an ambulance. Do not try to drive yourself.     · You have symptoms of a stroke. These may include:  ? Sudden numbness, tingling, weakness, or loss of movement in your face, arm, or leg, especially on only one side of your body. ? Sudden vision changes. ? Sudden trouble speaking. ? Sudden confusion or trouble understanding simple statements. ? Sudden problems with walking or balance. ? A sudden, severe headache that is different from past headaches. Call your doctor now or seek immediate medical care if:    · You are bleeding from the area where the catheter was put in your blood vessel.     · You have a fast-growing, painful lump at the catheter site.     · You have signs of infection, such as:  ? Increased pain, swelling, warmth, or redness. ? Red streaks leading from the catheter site. ? Pus draining from the catheter site. ? A fever.     · Your leg, arm, or hand is painful, looks blue, or feels cold, numb, or tingly. Watch closely for any changes in your health, and be sure to contact your doctor if you have any problems. Where can you learn more? Go to https://[employer] Allianceeb.[employer]. org and sign in to your [employer] account. Enter [phone] in the [facility] box to learn more about \"Electrophysiology Study and Catheter Ablation: What to Expect at Home. \"     If you do not have an account, please click on the \"Sign Up Now\" link. Current as of: August 31, 2020               Content Version: 12.9  © 2430-0961 Healthwise, Incorporated. Care instructions adapted under license by [facility] ([facility]). If you have questions about a medical condition or this instruction, always ask your healthcare professional. [place] [address] any warranty or liability for your use of this information.

## 2021-07-29 NOTE — PROGRESS NOTES
[address]   Electrophysiology Consult Note              Date: 7/29/21  Patient Name: Marbella Molina  YOB: 1965    Primary Care Physician: Onel Covarrubias MD    CHIEF COMPLAINT: No chief complaint on file. HISTORY OF PRESENT ILLNESS: Marbella Molina is a 60 y.o. male with a PMH significant for CHF, NICM, heart enlargement (genetic testing in progress). He follows with the heart failure team for his CHF and NICM and was initiated with a Lifevest on 10/16/2020. His EF was 42% on 3/24/2021 and his Lifevest was taken off on 3/25/2021. He wore a cardiac event monitor for palpitations in 3/2021 which demonstrated a PVC burden of 11.5%. Today, 7/29/2021, ECG demonstrates SB (55). He states that he can not feel the PVC's that he has. He states that he has not had any episodes of syncope. He states that he has much improved with his energy level and symptoms over the last couple of months. He reports that he is taking his medications as prescribed and tolerates them well. Patient denies current edema, chest pain, sob, palpitations, dizziness or syncope. Past Medical History:   has no past medical history on file. Past Surgical History:   has no past surgical history on file. Allergies:  Patient has no known allergies. Social History:   reports that he has never smoked. He has never used smokeless tobacco. He reports current alcohol use. He reports that he does not use drugs. Family History: family history includes Cancer in his father, mother, and sister. Home Medications:    Prior to Admission medications    Medication Sig Start Date End Date Taking?  Authorizing Provider   metoprolol succinate (TOPROL XL) 50 MG extended release tablet 0.5 tablet twice daily 7/6/21   Susy cMknight MD   furosemide (LASIX) 40 MG tablet TAKE ONE TABLET BY MOUTH DAILY 6/1/21   LELAND Pratt CNP   predniSONE (DELTASONE) 20 MG tablet Take 20 mg by mouth daily    Historical Provider, well  · No abnormalities of mood, affect, memory, mentation, or behavior are noted    DATA:    ECG 7/29/21: Normal sinus rhythm with LBBB. No PVCs noted. [place] Scan 3/24/2021  MUGA Conclusions  Abnormal resting left ventricular function with EF= 42.9% . Unable to  determine wall motion on current images. Echo 1/19/2021   Summary   Limited exam for LVEF. The left ventricular systolic function is moderate to severely reduced with   an ejection fraction of 25%. Global hypokinesis. IMPRESSION:    1. Sarcoid cardiomyopathy. 7/29/2021  Patient is a pleasant 61-year-old male with a medical history significant for cardiac sarcoidosis, nonsustained ventricular tachycardia with now mildly depressed left ventricular ejection fraction, premature ventricular contractions, and hypertension who presents from home to [facility] care. He has no recent history of ventricular tachycardia. He has no recent history of syncope. His LV scar burden is unclear (Dr. Gil President trying to assist me getting measurement based on cardiac MRI from 2020), and his LVEF is greater than 35% on MUGA. We discussed EPS for inducible VT however patient states that since he is doing well he would like to defer. He has no permanent pacing indications. While my preference would be to test him given his age, this recommendation is only class IIa. I will defer to his judgement. - Consider EPS and BiV ICD (discussed in clinic, NYHA II). - Continue GDMT and therapy for cardiac sarcoidosis. - Follow up with EP NP in 6 months then yearly unless/until procedure/discussion required or PRN. 2. Premature ventricular contractions. 07/29/2021  Patient recently wore a monitor on 04/21/2021 that showed moderate PVC burden that was symptomatic. He denies symptoms now. No PVCs on his EKG.   We discussed options including monitoring clinically, antiarrhythmic therapy and ablation (may improve cardiac function) however as he is feeling well he would Marty Byers MD on 7/29/2021 at 11:15 AM

## 2021-08-06 ENCOUNTER — HOSPITAL ENCOUNTER (OUTPATIENT)
Age: 56
Discharge: HOME | End: 2021-08-06
Payer: COMMERCIAL

## 2021-08-06 DIAGNOSIS — I50.22 SYSTOLIC CHF, CHRONIC (HCC): ICD-10-CM

## 2021-08-06 LAB
ANION GAP SERPL CALCULATED.3IONS-SCNC: 14 MMOL/L (ref 3–16)
BASOPHILS ABSOLUTE: 0 K/UL (ref 0–0.2)
BASOPHILS RELATIVE PERCENT: 0.4 %
BUN BLDV-MCNC: 22 MG/DL (ref 7–20)
CALCIUM SERPL-MCNC: 9.4 MG/DL (ref 8.3–10.6)
CHLORIDE BLD-SCNC: 98 MMOL/L (ref 99–110)
CO2: 25 MMOL/L (ref 21–32)
CREAT SERPL-MCNC: 1.1 MG/DL (ref 0.9–1.3)
EOSINOPHILS ABSOLUTE: 0 K/UL (ref 0–0.6)
EOSINOPHILS RELATIVE PERCENT: 0.4 %
GFR AFRICAN AMERICAN: >60
GFR NON-AFRICAN AMERICAN: >60
GLUCOSE BLD-MCNC: 214 MG/DL (ref 70–99)
HCT VFR BLD CALC: 39.5 % (ref 40.5–52.5)
HEMOGLOBIN: 13.6 G/DL (ref 13.5–17.5)
LYMPHOCYTES ABSOLUTE: 0.8 K/UL (ref 1–5.1)
LYMPHOCYTES RELATIVE PERCENT: 9.6 %
MCH RBC QN AUTO: 29.9 PG (ref 26–34)
MCHC RBC AUTO-ENTMCNC: 34.5 G/DL (ref 31–36)
MCV RBC AUTO: 86.9 FL (ref 80–100)
MONOCYTES ABSOLUTE: 0.3 K/UL (ref 0–1.3)
MONOCYTES RELATIVE PERCENT: 3.8 %
NEUTROPHILS ABSOLUTE: 7.4 K/UL (ref 1.7–7.7)
NEUTROPHILS RELATIVE PERCENT: 85.8 %
PDW BLD-RTO: 13.9 % (ref 12.4–15.4)
PLATELET # BLD: 195 K/UL (ref 135–450)
PMV BLD AUTO: 8.2 FL (ref 5–10.5)
POTASSIUM SERPL-SCNC: 4.7 MMOL/L (ref 3.5–5.1)
PRO-BNP: 176 PG/ML (ref 0–124)
RBC # BLD: 4.55 M/UL (ref 4.2–5.9)
SODIUM BLD-SCNC: 137 MMOL/L (ref 136–145)
WBC # BLD: 8.6 K/UL (ref 4–11)

## 2021-08-06 PROCEDURE — 36415 COLL VENOUS BLD VENIPUNCTURE: CPT

## 2021-08-06 PROCEDURE — 80048 BASIC METABOLIC PNL TOTAL CA: CPT

## 2021-08-06 PROCEDURE — 83880 ASSAY OF NATRIURETIC PEPTIDE: CPT

## 2021-08-06 PROCEDURE — 85025 COMPLETE CBC W/AUTO DIFF WBC: CPT

## 2021-08-10 ENCOUNTER — OFFICE VISIT (OUTPATIENT)
Dept: CARDIOLOGY CLINIC | Age: 56
End: 2021-08-10
Payer: COMMERCIAL

## 2021-08-10 VITALS
DIASTOLIC BLOOD PRESSURE: 82 MMHG | HEIGHT: 70 IN | SYSTOLIC BLOOD PRESSURE: 134 MMHG | BODY MASS INDEX: 27.63 KG/M2 | WEIGHT: 193 LBS | HEART RATE: 65 BPM | OXYGEN SATURATION: 97 %

## 2021-08-10 DIAGNOSIS — I44.7 LEFT BUNDLE BRANCH BLOCK: ICD-10-CM

## 2021-08-10 DIAGNOSIS — R06.02 SOB (SHORTNESS OF BREATH): ICD-10-CM

## 2021-08-10 DIAGNOSIS — D86.85 CARDIAC SARCOIDOSIS (HCC): ICD-10-CM

## 2021-08-10 DIAGNOSIS — I50.22 SYSTOLIC CHF, CHRONIC (HCC): Primary | ICD-10-CM

## 2021-08-10 DIAGNOSIS — I42.8 NON-ISCHEMIC CARDIOMYOPATHY (HCC): ICD-10-CM

## 2021-08-10 DIAGNOSIS — I10 ESSENTIAL HYPERTENSION: ICD-10-CM

## 2021-08-10 PROCEDURE — 99214 OFFICE O/P EST MOD 30 MIN: CPT | Performed by: INTERNAL MEDICINE

## 2021-08-10 NOTE — PROGRESS NOTES
[facility]  Advanced CHF/Pulmonary Hypertension   Cardiac Evaluation      Tai Sandra  YOB: 1965    Date of Visit:  8/10/21      Chief Complaint   Patient presents with    Follow-up    Congestive Heart Failure         History of Present Illness: Marilyn Bahena is a 60 y.o. male who presents from referral from Dr Reva Horton for consultation and management of advanced heart failure. He presented to hospital on 10/12/20 with complaints of severe SOB, BLE edema and chest pain. Echo from 10/13/20 demonstrated EF 20-25%, moderate biatrial enlargement, mild TR and NH. His LHC/RHC 10/14/20 demonstrated severe nonischemic cardiomyopathy, no significant CAD. His cardiac MRI from 12/04/20 showed NICM concerning for inflammatory or infiltrative disease with biventricular involvement. His PET scan from 12/11/20 showed abnormal FDG uptake in the lateral wall of the left ventricle and the right atrium consistent with active myocardial inflammation. Severe global HK and severely impaired LV function at 17.6%. His CT chest from 12/15/20 showed the heart is enlarged with a small pericardial effusion. He is going to see  pulmonology soon. Dr Reva Horton has not had [facility] testing yet [facility] He reports he feels well overall. He has SOB on exertion and BLE edema which has been slowly improving. He reports he had increased fatigue for the last 1-2 years. He denies CP, palpitations, dizziness or syncope. He takes little naps during the day. He has been sleeping in his chair. He reports he was waking up feeling smothery. He has not slept in a bed since 09/2020. At his OV on 12/22/20, his lisinopril was stopped. He was started on Entresto 1/2 tab of 49/51 mg twice daily and digoxin 125 mcg daily. He reported he was sleeping when he experienced VT on 12/13/2020.  He had a 10.5 second episode of monomorphic VT with a 360 msec cycle length at 1:24PM. He states he woke up and his Lifevest showed an orange screen that said \"patient respond\", he pushed the button and went back to sleep. On 01/05/21 he reported DR Parker José said he was 70% positive for sarcoidosis. He has been taking prednisone 40 mg daily. DR Parker José started methotrexate. He reported he did not have any more alarms from his Life Vest. He is tolerating the entresto and digoxin well. He reports he feels better and has more energy. His weight is down almost 20 pounds. His glucose is elevated. He was started on spironolactone 25 mg M,W,F and his lasix was adjusted to 20 mg on M,W,F and 40 mg all other days at this OV. His limited echo from 01/19/21 showed his EF was 25%. On 02/16/21 he reported DR Parker José decreased his prednisone to 30 mg then in 1 month go to 20 mg daily. His SBP has been controlled at home in the 110-120s. He is taking entresto 1/2 tab 49/51 mg twice daily. He takes spironolactone 3 days a week. At this OV his entresto was increased to 1/2 tablet 49/51 mg in am and full tablet 49/51 mg in pm.   His muga scan from 03/24/21 his EF was 42.9%. Life Vest off 03/25/21. His cardiac monitor from 04/2021 showed moderate PVC burden of 11.49%. Today he reports he is still on prednisone 20 mg daily. He reports he has some energy now. He denies CP, palpitations, SOB, dizziness or syncope. He is exercising daily and tolerating well.          No Known Allergies  Current Outpatient Medications   Medication Sig Dispense Refill    metoprolol succinate (TOPROL XL) 50 MG extended release tablet 0.5 tablet twice daily 90 tablet 3    furosemide (LASIX) 40 MG tablet TAKE ONE TABLET BY MOUTH DAILY (Patient taking differently: 40mg daily on 2 days a week, then 20mg daily all other days) 30 tablet 2    predniSONE (DELTASONE) 20 MG tablet Take 20 mg by mouth daily      sacubitril-valsartan (ENTRESTO)  MG per tablet Take 1 tablet by mouth 2 times daily (Patient taking differently: Take 1 tablet by mouth 1/2 tab BID) 60 tablet 11    pantoprazole (PROTONIX) 40 MG tablet Take 1 tablet by mouth every morning (before breakfast) 90 tablet 3    aspirin 81 MG chewable tablet CHEW ONE TABLET BY MOUTH DAILY 90 tablet 3    methotrexate (RHEUMATREX) 2.5 MG chemo tablet Take by mouth 4 days weekly      folic acid (FOLVITE) 1 MG tablet Take 1 mg by mouth daily      Multiple Vitamins-Minerals (THERAPEUTIC MULTIVITAMIN-MINERALS) tablet Take 1 tablet by mouth daily      spironolactone (ALDACTONE) 25 MG tablet 25 mg on M,W,F 30 tablet 5    digoxin (LANOXIN) 125 MCG tablet Take 1 tablet by mouth daily 30 tablet 11    Blood Glucose Monitoring Suppl (Usentric BLOOD GLUCOSE KIT) KIT 1 each by Does not apply route daily 1 kit 0     No current facility-administered medications for this visit. History reviewed. No pertinent past medical history. History reviewed. No pertinent surgical history. Family History   Problem Relation Age of Onset    Cancer Mother         skin     Cancer Father         skin, prostate     Cancer Sister         skin, Melanoma     Social History     Socioeconomic History    Marital status:      Spouse name: Not on file    Number of children: Not on file    Years of education: Not on file    Highest education level: Not on file   Occupational History    Not on file   Tobacco Use    Smoking status: Never Smoker    Smokeless tobacco: Never Used   Vaping Use    Vaping Use: Never used   Substance and Sexual Activity    Alcohol use: Yes     Comment: rarely    Drug use: No    Sexual activity: Not on file   Other Topics Concern    Not on file   Social History Narrative    Not on file     Social Determinants of Health     Financial Resource Strain:     Difficulty of Paying Living Expenses:    Food Insecurity:     Worried About [address] in the Last Year:     [address] in the Last Year:    Transportation Needs:     Lack of Transportation (Medical):      Lack of Transportation (Non-Medical): Physical Activity:     Days of Exercise per Week:     Minutes of Exercise per Session:    Stress:     Feeling of Stress :    Social Connections:     Frequency of Communication with Friends and Family:     Frequency of Social Gatherings with Friends and Family:     Attends L[employer] day Services:     Active Member of Clubs or Organizations:     Attends Club or Organization Meetings:     Marital Status:    Intimate Partner Violence:     Fear of Current or Ex-Partner:     Emotionally Abused:     Physically Abused:     Sexually Abused:        Review of Systems:   · Constitutional: there has been no unanticipated weight loss. There's been no change in energy level, sleep pattern, or activity level. · Eyes: No visual changes or diplopia. No scleral icterus. · ENT: No Headaches, hearing loss or vertigo. No mouth sores or sore throat. · Cardiovascular: Reviewed in HPI  · Respiratory: No cough or wheezing, no sputum production. No hematemesis. · Gastrointestinal: No abdominal pain, appetite loss, blood in stools. No change in bowel or bladder habits. · Genitourinary: No dysuria, trouble voiding, or hematuria. · Musculoskeletal:  No gait disturbance, weakness or joint complaints. · Integumentary: No rash or pruritis. · Neurological: No headache, diplopia, change in muscle strength, numbness or tingling. No change in gait, balance, coordination, mood, affect, memory, mentation, behavior. · Psychiatric: No anxiety, no depression. · Endocrine: No malaise, fatigue or temperature intolerance. No excessive thirst, fluid intake, or urination. No tremor. · Hematologic/Lymphatic: No abnormal bruising or bleeding, blood clots or swollen lymph nodes. · Allergic/Immunologic: No nasal congestion or hives. Physical Examination:    Vitals:    08/10/21 1041   BP: 134/82   Pulse: 65   SpO2: 97%   Weight: 193 lb (87.5 kg)   Height: 5' 10\" (1.778 m)     Body mass index is 27.69 kg/m².      Wt Readings from Last 3 Encounters:   08/10/21 193 lb (87.5 kg)   07/29/21 189 lb (85.7 kg)   05/13/21 191 lb (86.6 kg)     BP Readings from Last 3 Encounters:   08/10/21 134/82   07/29/21 130/70   05/13/21 120/86            Constitutional and General Appearance:   WD/WN in NAD  HEENT:  NC/AT  TAMIE  No problems with hearing  Skin:  Warm, dry  Respiratory:  · Normal excursion and expansion without use of accessory muscles  · Resp Auscultation: Normal breath sounds without dullness  Cardiovascular:  · The apical impulses not displaced  · Heart tones are crisp and normal  · Cervical veins are not engorged  · The carotid upstroke is normal in amplitude and contour without delay or bruit  · JVP less than 8 cm H2O  RRR with nl S1 and S2 without m,r,g  · Peripheral pulses are symmetrical and full  · There is no clubbing, cyanosis of the extremities. · No edema  · Femoral Arteries: 2+ and equal  · Pedal Pulses: 2+ and equal   Neck:  · No thyromegaly  Abdomen:  · No masses or tenderness  · Liver/Spleen: No Abnormalities Noted  Neurological/Psychiatric:  · Alert and oriented in all spheres  · Moves all extremities well  · Exhibits normal gait balance and coordination  · No abnormalities of mood, affect, memory, mentation, or behavior are noted    Echo: 10/13/20  The left ventricular systolic function is severely reduced with an ejection   fraction of 20 - 25 %. There is hypokinesis of the apex, apical lateral, apical septum, anterior,   inferior, anteroseptum and apical anterior walls. Left ventricular cavity size is mildly dilated. Left ventricular diastolic filling pressure is elevated. Moderate mitral regurgitation. Mild Bi-atrial enlargement. The right ventricle is mildly enlarged. Right ventricular systolic function is mildly reduced . Mild tricuspid and pulmonic regurgitation. There is a left pleural effusion.     Cardiac cath: 10/14/20  Findings:      LEFT HEART CATH  LM: ostial pinch (MLA by IVUS was >30)  LAD: luminals, luminals calcification  Ramus: luminals  LCX: Luminals  RCA: dominant, sluggish flow. KEVIN-2 flow. LVEDP: 15  LVEF: <20%, severe global hypokinesis. No MR, No AS        RIGHT HEART CATH  RA: 6   RV: 33/6   PA: 33/15   and mean of  22  PCWP: 15     Sats: on RA  Ao: 89%  RA:  59%  PA: 61%     CO/CI: 4.5/2.2 (stephanie)  SVR/PVR: 1272/122        Assessment  1. Severe nonischemic cardiomyopathy. LVEF less than 20%                -We will start secondary work-up                - will consider LifeVest  2. No significant CAD. Luminal calcification  3.  Relatively normal mildly elevated intracardiac filling pressures. MRI card: 12/4/20  IMPRESSION: Findings are most consistent with non-ischemic cardiomyopathy concerning for inflammatory or infiltrative disease with biventricular involvement. Differential includes subacute myocarditis or an infiltrative process such as cardiac sarcoidosis. Recommend FDG PET for further evaluation for sarcoidosis. There is four chamber dilation with severely reduced biventricular systolic function. There is evidence of decompensated heart failure. PET: 12/11/20  IMPRESSION: 1. Abnormal FDG uptake in the lateral wall of the left ventricle, and the right atrium consistent with active myocardial inflammation. 2. Severely impaired left ventricular function with severe global hypokinesis. 3. Normal resting left ventricular perfusion. 4. Moderate bilateral pleural effusion. CT chest: 12/15/20  FINDINGS: Mediastinum: The heart is enlarged with a small pericardial effusion. Coronary artery calcifications are a marker of atherosclerosis. Scattered mediastinal lymph nodes are not pathologic by CT criteria. The lizbeth not be evaluated in the absence of intravenous contrast.  Lungs/pleura: The tracheobronchial tree is patent. There is no pneumothorax. There small to moderate bilateral pleural effusions with atelectasis, right greater than left.   There is biapical scarring. There is mild bilateral interstitial thickening within the bases likely due to interstitial edema. Upper Abdomen: Images of the upper abdomen are unremarkable. Soft Tissues/Bones: Degenerative changes involve the thoracic spine. Limited echo: 01/19/21  Summary   Limited exam for LVEF. The left ventricular systolic function is moderate to severely reduced with   an ejection fraction of 25%. Global hypokinesis. Muga: 03/24/21  MUGA Conclusions  Abnormal resting left ventricular function with EF= 42.9% . Unable to  determine wall motion on current images. Life Vest discontinued. Labs were reviewed including labs from other hospital systems through [facility]. Cardiac testing was reviewed including echos, nuclear scans, cardiac catheterization, including from other hospital systems through [facility]. Assessment:    1. Systolic CHF, chronic ([address]    2. Left bundle branch block    3. Cardiac sarcoidosis ([address]    4. Essential hypertension    5. SOB (shortness of breath)    6. Non-ischemic cardiomyopathy ([address]            Plan:  1. Lipids, CBC, CMP, BNP in 3 months  2. Echo in 3 months  3. Follow up in 3 months      Scribe's attestation: This note was scribed in the presence of Huyen Navarro M.D. By Leon Adams RN    The scribe's documentation has been prepared under my direction and personally reviewed by me in its entirety. I confirm that the note above accurately reflects all work, treatment, procedures, and medical decision making performed by me. Time Based Itemization  A total of 30 minutes was spent on today's patient encounter.   If applicable, non-patient-facing activities:  ( x)Preparing to see the patient and reviewing records  ( ) Individual interpretation of results  ( ) Discussion or coordination of care with other health care professionals  ( x) Ordering of unique tests, medications, or procedures  ( x) Documentation within the EHR         I appreciate the opportunity of cooperating in the care of this patient.     Nazanin Barfield M.D., [facility]

## 2021-10-11 RX ORDER — FUROSEMIDE 40 MG/1
TABLET ORAL
Qty: 108 TABLET | Refills: 1 | Status: SHIPPED | OUTPATIENT
Start: 2021-10-11 | End: 2022-07-18 | Stop reason: SDUPTHER

## 2021-11-02 ENCOUNTER — HOSPITAL ENCOUNTER (OUTPATIENT)
Dept: CARDIOLOGY | Age: 56
Discharge: HOME | End: 2021-11-02
Payer: COMMERCIAL

## 2021-11-02 ENCOUNTER — HOSPITAL ENCOUNTER (OUTPATIENT)
Age: 56
Discharge: HOME | End: 2021-11-02
Payer: COMMERCIAL

## 2021-11-02 ENCOUNTER — OFFICE VISIT (OUTPATIENT)
Dept: CARDIOLOGY CLINIC | Age: 56
End: 2021-11-02
Payer: COMMERCIAL

## 2021-11-02 VITALS
HEIGHT: 70 IN | WEIGHT: 195 LBS | SYSTOLIC BLOOD PRESSURE: 118 MMHG | OXYGEN SATURATION: 98 % | HEART RATE: 70 BPM | BODY MASS INDEX: 27.92 KG/M2 | DIASTOLIC BLOOD PRESSURE: 60 MMHG

## 2021-11-02 DIAGNOSIS — I47.29 NSVT (NONSUSTAINED VENTRICULAR TACHYCARDIA): ICD-10-CM

## 2021-11-02 DIAGNOSIS — I50.22 SYSTOLIC CHF, CHRONIC (HCC): ICD-10-CM

## 2021-11-02 DIAGNOSIS — I50.22 SYSTOLIC CHF, CHRONIC (HCC): Primary | ICD-10-CM

## 2021-11-02 DIAGNOSIS — I44.7 LEFT BUNDLE BRANCH BLOCK: ICD-10-CM

## 2021-11-02 DIAGNOSIS — I10 ESSENTIAL HYPERTENSION: ICD-10-CM

## 2021-11-02 DIAGNOSIS — R06.02 SOB (SHORTNESS OF BREATH): ICD-10-CM

## 2021-11-02 DIAGNOSIS — D86.85 CARDIAC SARCOIDOSIS (HCC): ICD-10-CM

## 2021-11-02 LAB
A/G RATIO: 1.8 (ref 1.1–2.2)
ALBUMIN SERPL-MCNC: 4.4 G/DL (ref 3.4–5)
ALP BLD-CCNC: 65 U/L (ref 40–129)
ALT SERPL-CCNC: 54 U/L (ref 10–40)
ANION GAP SERPL CALCULATED.3IONS-SCNC: 15 MMOL/L (ref 3–16)
AST SERPL-CCNC: 28 U/L (ref 15–37)
BASOPHILS ABSOLUTE: 0 K/UL (ref 0–0.2)
BASOPHILS RELATIVE PERCENT: 0.6 %
BILIRUB SERPL-MCNC: 0.4 MG/DL (ref 0–1)
BUN BLDV-MCNC: 19 MG/DL (ref 7–20)
C-REACTIVE PROTEIN: <3 MG/L (ref 0–5.1)
CALCIUM SERPL-MCNC: 9.4 MG/DL (ref 8.3–10.6)
CHLORIDE BLD-SCNC: 100 MMOL/L (ref 99–110)
CHOLESTEROL, TOTAL: 260 MG/DL (ref 0–199)
CO2: 25 MMOL/L (ref 21–32)
CREAT SERPL-MCNC: 1.1 MG/DL (ref 0.9–1.3)
EOSINOPHILS ABSOLUTE: 0.1 K/UL (ref 0–0.6)
EOSINOPHILS RELATIVE PERCENT: 1.7 %
GFR AFRICAN AMERICAN: >60
GFR NON-AFRICAN AMERICAN: >60
GLUCOSE BLD-MCNC: 174 MG/DL (ref 70–99)
HCT VFR BLD CALC: 42.1 % (ref 40.5–52.5)
HDLC SERPL-MCNC: 33 MG/DL (ref 40–60)
HEMOGLOBIN: 13.9 G/DL (ref 13.5–17.5)
LDL CHOLESTEROL CALCULATED: ABNORMAL MG/DL
LDL CHOLESTEROL DIRECT: 132 MG/DL
LV EF: 33 %
LVEF MODALITY: NORMAL
LYMPHOCYTES ABSOLUTE: 1 K/UL (ref 1–5.1)
LYMPHOCYTES RELATIVE PERCENT: 16.6 %
MCH RBC QN AUTO: 28.5 PG (ref 26–34)
MCHC RBC AUTO-ENTMCNC: 33.1 G/DL (ref 31–36)
MCV RBC AUTO: 86.1 FL (ref 80–100)
MONOCYTES ABSOLUTE: 0.5 K/UL (ref 0–1.3)
MONOCYTES RELATIVE PERCENT: 7.9 %
NEUTROPHILS ABSOLUTE: 4.5 K/UL (ref 1.7–7.7)
NEUTROPHILS RELATIVE PERCENT: 73.2 %
PDW BLD-RTO: 13.3 % (ref 12.4–15.4)
PLATELET # BLD: 184 K/UL (ref 135–450)
PMV BLD AUTO: 8.6 FL (ref 5–10.5)
POTASSIUM SERPL-SCNC: 4.4 MMOL/L (ref 3.5–5.1)
PRO-BNP: 155 PG/ML (ref 0–124)
RBC # BLD: 4.89 M/UL (ref 4.2–5.9)
SODIUM BLD-SCNC: 140 MMOL/L (ref 136–145)
TOTAL PROTEIN: 6.8 G/DL (ref 6.4–8.2)
TRIGL SERPL-MCNC: 617 MG/DL (ref 0–150)
VLDLC SERPL CALC-MCNC: ABNORMAL MG/DL
WBC # BLD: 6.1 K/UL (ref 4–11)

## 2021-11-02 PROCEDURE — 85025 COMPLETE CBC W/AUTO DIFF WBC: CPT

## 2021-11-02 PROCEDURE — 36415 COLL VENOUS BLD VENIPUNCTURE: CPT

## 2021-11-02 PROCEDURE — 80061 LIPID PANEL: CPT

## 2021-11-02 PROCEDURE — 83721 ASSAY OF BLOOD LIPOPROTEIN: CPT

## 2021-11-02 PROCEDURE — 93306 TTE W/DOPPLER COMPLETE: CPT

## 2021-11-02 PROCEDURE — 80053 COMPREHEN METABOLIC PANEL: CPT

## 2021-11-02 PROCEDURE — 83036 HEMOGLOBIN GLYCOSYLATED A1C: CPT

## 2021-11-02 PROCEDURE — 99215 OFFICE O/P EST HI 40 MIN: CPT | Performed by: INTERNAL MEDICINE

## 2021-11-02 PROCEDURE — 83880 ASSAY OF NATRIURETIC PEPTIDE: CPT

## 2021-11-02 PROCEDURE — 86140 C-REACTIVE PROTEIN: CPT

## 2021-11-02 NOTE — PROGRESS NOTES
[facility]  Advanced CHF/Pulmonary Hypertension   Cardiac Evaluation      Olive Sandra  YOB: 1965    Date of Visit:  11/2/21      Chief Complaint   Patient presents with    Follow-up    Congestive Heart Failure         History of Present Illness: Chikis Houston is a 60 y.o. male who presents from referral from Dr Jazlyn Wells for consultation and management of advanced heart failure. He presented to hospital on 10/12/20 with complaints of severe SOB, BLE edema and chest pain. Echo from 10/13/20 demonstrated EF 20-25%, moderate biatrial enlargement, mild TR and CA. His LHC/RHC 10/14/20 demonstrated severe nonischemic cardiomyopathy, no significant CAD. His cardiac MRI from 12/04/20 showed NICM concerning for inflammatory or infiltrative disease with biventricular involvement. His PET scan from 12/11/20 showed abnormal FDG uptake in the lateral wall of the left ventricle and the right atrium consistent with active myocardial inflammation. Severe global HK and severely impaired LV function at 17.6%. His CT chest from 12/15/20 showed the heart is enlarged with a small pericardial effusion. He is going to see  pulmonology soon. Dr Jazlyn Wells has not had [facility] testing yet [facility] He reports he feels well overall. He has SOB on exertion and BLE edema which has been slowly improving. He reports he had increased fatigue for the last 1-2 years. He denies CP, palpitations, dizziness or syncope. He takes little naps during the day. He has been sleeping in his chair. He reports he was waking up feeling smothery. He has not slept in a bed since 09/2020. At his OV on 12/22/20, his lisinopril was stopped. He was started on Entresto 1/2 tab of 49/51 mg twice daily and digoxin 125 mcg daily. He reported he was sleeping when he experienced VT on 12/13/2020.  He had a 10.5 second episode of monomorphic VT with a 360 msec cycle length at 1:24PM. He states he woke up and his Lifevest showed an orange screen that said \"patient respond\", he pushed the button and went back to sleep. On 01/05/21 he reported DR Belén Bridges said he was 70% positive for sarcoidosis. He has been taking prednisone 40 mg daily. DR Belén Bridges started methotrexate. He reported he did not have any more alarms from his Life Vest. He is tolerating the entresto and digoxin well. He reports he feels better and has more energy. His weight is down almost 20 pounds. His glucose is elevated. He was started on spironolactone 25 mg M,W,F and his lasix was adjusted to 20 mg on M,W,F and 40 mg all other days at this OV. His limited echo from 01/19/21 showed his EF was 25%. On 02/16/21 he reported DR Belén Bridges decreased his prednisone to 30 mg then in 1 month go to 20 mg daily. His SBP has been controlled at home in the 110-120s. He is taking entresto 1/2 tab 49/51 mg twice daily. He takes spironolactone 3 days a week. At this OV his entresto was increased to 1/2 tablet 49/51 mg in am and full tablet 49/51 mg in pm.   His muga scan from 03/24/21 his EF was 42.9%. Life Vest off 03/25/21. His cardiac monitor from 04/2021 showed moderate PVC burden of 11.49%. Today he reports his prednisone is now down to 10 mg daily. He is still on methotrexate. He takes lasix 40 mg twice a week and 20 mg all other days. He denies CP, SOB, dizziness or syncope. He is staying active.            No Known Allergies  Current Outpatient Medications   Medication Sig Dispense Refill    furosemide (LASIX) 40 MG tablet 40mg daily on 2 days a week, then 20mg daily all other days 108 tablet 1    metoprolol succinate (TOPROL XL) 50 MG extended release tablet 0.5 tablet twice daily 90 tablet 3    predniSONE (DELTASONE) 20 MG tablet Take 10 mg by mouth daily       sacubitril-valsartan (ENTRESTO)  MG per tablet Take 1 tablet by mouth 2 times daily (Patient taking differently: Take 1 tablet by mouth 1/2 tab BID) 60 tablet 11    pantoprazole (PROTONIX) 40 MG tablet Take 1 tablet by mouth every morning (before breakfast) 90 tablet 3    aspirin 81 MG chewable tablet CHEW ONE TABLET BY MOUTH DAILY 90 tablet 3    methotrexate (RHEUMATREX) 2.5 MG chemo tablet Take by mouth 4 days weekly      folic acid (FOLVITE) 1 MG tablet Take 1 mg by mouth daily      Multiple Vitamins-Minerals (THERAPEUTIC MULTIVITAMIN-MINERALS) tablet Take 1 tablet by mouth daily      spironolactone (ALDACTONE) 25 MG tablet 25 mg on M,W,F 30 tablet 5    digoxin (LANOXIN) 125 MCG tablet Take 1 tablet by mouth daily 30 tablet 11    Blood Glucose Monitoring Suppl (LawDeckOGER BLOOD GLUCOSE KIT) KIT 1 each by Does not apply route daily 1 kit 0     No current facility-administered medications for this visit. History reviewed. No pertinent past medical history. History reviewed. No pertinent surgical history. Family History   Problem Relation Age of Onset    Cancer Mother         skin     Cancer Father         skin, prostate     Cancer Sister         skin, Melanoma     Social History     Socioeconomic History    Marital status:      Spouse name: Not on file    Number of children: Not on file    Years of education: Not on file    Highest education level: Not on file   Occupational History    Not on file   Tobacco Use    Smoking status: Never Smoker    Smokeless tobacco: Never Used   Vaping Use    Vaping Use: Never used   Substance and Sexual Activity    Alcohol use: Yes     Comment: rarely    Drug use: No    Sexual activity: Not on file   Other Topics Concern    Not on file   Social History Narrative    Not on file     Social Determinants of Health     Financial Resource Strain:     Difficulty of Paying Living Expenses:    Food Insecurity:     Worried About [address] in the Last Year:     [address] in the Last Year:    Transportation Needs:     Lack of Transportation (Medical):      Lack of Transportation (Non-Medical):    Physical Activity:     Days of Exercise per Week:     Minutes of Exercise per Session:    Stress:     Feeling of Stress :    Social Connections:     Frequency of Communication with Friends and Family:     Frequency of Social Gatherings with Friends and Family:     Attends Tenri[employer] Services:     Active Member of Clubs or Organizations:     Attends Club or Organization Meetings:     Marital Status:    Intimate Partner Violence:     Fear of Current or Ex-Partner:     Emotionally Abused:     Physically Abused:     Sexually Abused:        Review of Systems:   · Constitutional: there has been no unanticipated weight loss. There's been no change in energy level, sleep pattern, or activity level. · Eyes: No visual changes or diplopia. No scleral icterus. · ENT: No Headaches, hearing loss or vertigo. No mouth sores or sore throat. · Cardiovascular: Reviewed in HPI  · Respiratory: No cough or wheezing, no sputum production. No hematemesis. · Gastrointestinal: No abdominal pain, appetite loss, blood in stools. No change in bowel or bladder habits. · Genitourinary: No dysuria, trouble voiding, or hematuria. · Musculoskeletal:  No gait disturbance, weakness or joint complaints. · Integumentary: No rash or pruritis. · Neurological: No headache, diplopia, change in muscle strength, numbness or tingling. No change in gait, balance, coordination, mood, affect, memory, mentation, behavior. · Psychiatric: No anxiety, no depression. · Endocrine: No malaise, fatigue or temperature intolerance. No excessive thirst, fluid intake, or urination. No tremor. · Hematologic/Lymphatic: No abnormal bruising or bleeding, blood clots or swollen lymph nodes. · Allergic/Immunologic: No nasal congestion or hives. Physical Examination:    Vitals:    11/02/21 1334   BP: 118/60   Pulse: 70   SpO2: 98%   Weight: 195 lb (88.5 kg)   Height: 5' 10\" (1.778 m)     Body mass index is 27.98 kg/m².      Wt Readings from Last 3 Encounters:   11/02/21 195 lb (88.5 kg)   08/10/21 193 lb (87.5 kg)   07/29/21 189 lb (85.7 kg)     BP Readings from Last 3 Encounters:   11/02/21 118/60   08/10/21 134/82   07/29/21 130/70              Constitutional and General Appearance:   WD/WN in NAD  HEENT:  NC/AT  TAMIE  No problems with hearing  Skin:  Warm, dry  Respiratory:  · Normal excursion and expansion without use of accessory muscles  · Resp Auscultation: Normal breath sounds without dullness  Cardiovascular:  · The apical impulses not displaced  · Heart tones are crisp and normal  · Cervical veins are not engorged  · The carotid upstroke is normal in amplitude and contour without delay or bruit  · JVP less than 8 cm H2O  RRR with nl S1 and S2 without m,r,g  · Peripheral pulses are symmetrical and full  · There is no clubbing, cyanosis of the extremities. · No edema  · Femoral Arteries: 2+ and equal  · Pedal Pulses: 2+ and equal   Neck:  · No thyromegaly  Abdomen:  · No masses or tenderness  · Liver/Spleen: No Abnormalities Noted  Neurological/Psychiatric:  · Alert and oriented in all spheres  · Moves all extremities well  · Exhibits normal gait balance and coordination  · No abnormalities of mood, affect, memory, mentation, or behavior are noted    Echo: 10/13/20  The left ventricular systolic function is severely reduced with an ejection   fraction of 20 - 25 %. There is hypokinesis of the apex, apical lateral, apical septum, anterior,   inferior, anteroseptum and apical anterior walls. Left ventricular cavity size is mildly dilated. Left ventricular diastolic filling pressure is elevated. Moderate mitral regurgitation. Mild Bi-atrial enlargement. The right ventricle is mildly enlarged. Right ventricular systolic function is mildly reduced . Mild tricuspid and pulmonic regurgitation. There is a left pleural effusion.     Cardiac cath: 10/14/20  Findings:      LEFT HEART CATH  LM: ostial pinch (MLA by IVUS was >30)  LAD: luminals, luminals calcification  Ramus: luminals  LCX: Luminals  RCA: dominant, sluggish flow. KEVIN-2 flow. LVEDP: 15  LVEF: <20%, severe global hypokinesis. No MR, No AS        RIGHT HEART CATH  RA: 6   RV: 33/6   PA: 33/15   and mean of  22  PCWP: 15     Sats: on RA  Ao: 89%  RA:  59%  PA: 61%     CO/CI: 4.5/2.2 (stephanie)  SVR/PVR: 1272/122        Assessment  1. Severe nonischemic cardiomyopathy. LVEF less than 20%                -We will start secondary work-up                - will consider LifeVest  2. No significant CAD. Luminal calcification  3.  Relatively normal mildly elevated intracardiac filling pressures. MRI card: 12/4/20  IMPRESSION: Findings are most consistent with non-ischemic cardiomyopathy concerning for inflammatory or infiltrative disease with biventricular involvement. Differential includes subacute myocarditis or an infiltrative process such as cardiac sarcoidosis. Recommend FDG PET for further evaluation for sarcoidosis. There is four chamber dilation with severely reduced biventricular systolic function. There is evidence of decompensated heart failure. PET: 12/11/20  IMPRESSION: 1. Abnormal FDG uptake in the lateral wall of the left ventricle, and the right atrium consistent with active myocardial inflammation. 2. Severely impaired left ventricular function with severe global hypokinesis. 3. Normal resting left ventricular perfusion. 4. Moderate bilateral pleural effusion. CT chest: 12/15/20  FINDINGS: Mediastinum: The heart is enlarged with a small pericardial effusion. Coronary artery calcifications are a marker of atherosclerosis. Scattered mediastinal lymph nodes are not pathologic by CT criteria. The lizbeth not be evaluated in the absence of intravenous contrast.  Lungs/pleura: The tracheobronchial tree is patent. There is no pneumothorax. There small to moderate bilateral pleural effusions with atelectasis, right greater than left. There is biapical scarring.   There is mild bilateral interstitial thickening within the bases likely due to interstitial edema. Upper Abdomen: Images of the upper abdomen are unremarkable. Soft Tissues/Bones: Degenerative changes involve the thoracic spine. Limited echo: 01/19/21  Summary   Limited exam for LVEF. The left ventricular systolic function is moderate to severely reduced with   an ejection fraction of 25%. Global hypokinesis. Muga: 03/24/21  MUGA Conclusions  Abnormal resting left ventricular function with EF= 42.9% . Unable to  determine wall motion on current images. Life Vest discontinued. Labs were reviewed including labs from other hospital systems through [facility]. Cardiac testing was reviewed including echos, nuclear scans, cardiac catheterization, including from other hospital systems through [facility]. Assessment:    1. Systolic CHF, chronic ([facility] [address]    2. Cardiac sarcoidosis ([facility] [address]    3. Left bundle branch block    4. Essential hypertension    5. SOB (shortness of breath)    6. NSVT (nonsustained ventricular tachycardia) ([facility])      MUGA SCANS WILL BETTER ESTIMATE EF DUE TO LBBB        Plan:  1. Will try to add A1C, CRP and sed rate to today's labs  2. May need to have MUGA scan depending on echo results from today  3. Continue current medications  4. 2 week cardiac monitor for palpitations. Will see which company your insurance covers  5. CMP, CBC, ESR, CRP, BNP before next visit  6. Follow up in Jan/Feb        Scribe's attestation: This note was scribed in the presence of Eli Duong M.D. By Kody Fermin RN    The scribe's documentation has been prepared under my direction and personally reviewed by me in its entirety. I confirm that the note above accurately reflects all work, treatment, procedures, and medical decision making performed by me. Time Based Itemization  A total of 40 minutes was spent on today's patient encounter.   If applicable, non-patient-facing activities:  ( x)Preparing to see the patient and reviewing records  ( ) Individual interpretation of results  ( ) Discussion or coordination of care with other health care professionals  ( x) Ordering of unique tests, medications, or procedures  ( x) Documentation within the EHR           I appreciate the opportunity of cooperating in the care of this patient.     Cailin Mcintosh M.D., [address] [facility]

## 2021-11-03 LAB
ESTIMATED AVERAGE GLUCOSE: 159.9 MG/DL
HBA1C MFR BLD: 7.2 %

## 2021-11-04 ENCOUNTER — TELEPHONE (OUTPATIENT)
Dept: CARDIOLOGY CLINIC | Age: 56
End: 2021-11-04

## 2021-11-05 ENCOUNTER — TELEPHONE (OUTPATIENT)
Dept: CARDIOLOGY CLINIC | Age: 56
End: 2021-11-05

## 2021-11-05 DIAGNOSIS — I10 ESSENTIAL HYPERTENSION: Primary | ICD-10-CM

## 2021-11-05 NOTE — TELEPHONE ENCOUNTER
Spoke to pt and placed order for MUGA scan and provided phone number to central scheduling to set it up.

## 2021-11-08 NOTE — TELEPHONE ENCOUNTER
----- Message from Kim Xiong MD sent at 11/5/2021  2:06 PM EDT -----  Call patient. Start lopid 600 mg po bid for triglycerides. Discuss his need to see PCP about glucose. Radha Blair, your labs show that your A1c is increasing, so I suggest you follow up with your primary care physician about your sugars. Since it appears you will be on prednisone for a long time, you need to get this treated. Inflammation marker CRP is normal  BNP is low which is good  I want to start you on something for your triglycerides. I will have the office call you to discuss.   Kim Xiong

## 2021-11-09 RX ORDER — GEMFIBROZIL 600 MG/1
600 TABLET, FILM COATED ORAL
Qty: 180 TABLET | Refills: 3 | Status: SHIPPED | OUTPATIENT
Start: 2021-11-09 | End: 2022-10-18

## 2021-12-20 DIAGNOSIS — I47.29 NSVT (NONSUSTAINED VENTRICULAR TACHYCARDIA): Primary | ICD-10-CM

## 2021-12-20 RX ORDER — DIGOXIN 125 MCG
125 TABLET ORAL DAILY
Qty: 90 TABLET | Refills: 3 | Status: SHIPPED | OUTPATIENT
Start: 2021-12-20 | End: 2021-12-23 | Stop reason: SDUPTHER

## 2021-12-20 NOTE — TELEPHONE ENCOUNTER
Medication Refill    Medication needing refilled: DIGOXIN    Dosage of the medication: 125 MCG    How are you taking this medication (QD, BID, TID, QID, PRN): 1 TAB BY MOUTH DAILY    30 or 90 day supply called in: 27 DAY    When will you run out of your medication: OUT 12/20    Which Pharmacy are we sending the medication to?:    [facility]  [facility]   [address] [place] [address]  Ph. [phone]  Fax.  [phone]  Mon-Fri (8:00am-8:00pm)  Sat (9:00am-6:00pm)  Sun (10:00am-5:00pm)

## 2021-12-22 ENCOUNTER — HOSPITAL ENCOUNTER (OUTPATIENT)
Dept: NUCLEAR MEDICINE | Age: 56
Discharge: HOME | End: 2021-12-22
Payer: COMMERCIAL

## 2021-12-22 DIAGNOSIS — I10 ESSENTIAL HYPERTENSION: ICD-10-CM

## 2021-12-22 LAB
LV EF: 35 %
LVEF MODALITY: NORMAL

## 2021-12-22 PROCEDURE — 3430000000 HC RX DIAGNOSTIC RADIOPHARMACEUTICAL: Performed by: INTERNAL MEDICINE

## 2021-12-22 PROCEDURE — 78472 GATED HEART PLANAR SINGLE: CPT

## 2021-12-22 PROCEDURE — A9560 TC99M LABELED RBC: HCPCS | Performed by: INTERNAL MEDICINE

## 2021-12-22 RX ADMIN — Medication 28 MILLICURIE: at 09:41

## 2021-12-23 RX ORDER — DIGOXIN 125 MCG
125 TABLET ORAL DAILY
Qty: 90 TABLET | Refills: 3 | Status: SHIPPED | OUTPATIENT
Start: 2021-12-23

## 2022-02-14 DIAGNOSIS — I50.22 SYSTOLIC CHF, CHRONIC (HCC): ICD-10-CM

## 2022-02-14 RX ORDER — SPIRONOLACTONE 25 MG/1
TABLET ORAL
Qty: 30 TABLET | Refills: 5 | Status: SHIPPED | OUTPATIENT
Start: 2022-02-14 | End: 2022-02-14 | Stop reason: SDUPTHER

## 2022-02-14 RX ORDER — SPIRONOLACTONE 25 MG/1
TABLET ORAL
Qty: 30 TABLET | Refills: 5 | Status: SHIPPED | OUTPATIENT
Start: 2022-02-14

## 2022-02-14 NOTE — TELEPHONE ENCOUNTER
Last OV with OLMAN 11/2021. Next appt OLMAN 3/1/22.   CMP 11/2/21    Spironolactone 25 mg  ( 30 day supply, refill x 5)

## 2022-02-25 ENCOUNTER — HOSPITAL ENCOUNTER (OUTPATIENT)
Age: 57
Discharge: HOME | End: 2022-02-25
Payer: COMMERCIAL

## 2022-02-25 DIAGNOSIS — D86.85 CARDIAC SARCOIDOSIS (HCC): ICD-10-CM

## 2022-02-25 DIAGNOSIS — I50.22 SYSTOLIC CHF, CHRONIC (HCC): ICD-10-CM

## 2022-02-25 LAB
A/G RATIO: 2.6 (ref 1.1–2.2)
ALBUMIN SERPL-MCNC: 5.4 G/DL (ref 3.4–5)
ALP BLD-CCNC: 61 U/L (ref 40–129)
ALT SERPL-CCNC: 70 U/L (ref 10–40)
ANION GAP SERPL CALCULATED.3IONS-SCNC: 19 MMOL/L (ref 3–16)
AST SERPL-CCNC: 43 U/L (ref 15–37)
BASOPHILS ABSOLUTE: 0 K/UL (ref 0–0.2)
BASOPHILS RELATIVE PERCENT: 0.7 %
BILIRUB SERPL-MCNC: 0.4 MG/DL (ref 0–1)
BUN BLDV-MCNC: 17 MG/DL (ref 7–20)
C-REACTIVE PROTEIN: <3 MG/L (ref 0–5.1)
CALCIUM SERPL-MCNC: 10.3 MG/DL (ref 8.3–10.6)
CHLORIDE BLD-SCNC: 99 MMOL/L (ref 99–110)
CO2: 21 MMOL/L (ref 21–32)
CREAT SERPL-MCNC: 1.1 MG/DL (ref 0.9–1.3)
EOSINOPHILS ABSOLUTE: 0 K/UL (ref 0–0.6)
EOSINOPHILS RELATIVE PERCENT: 0.9 %
GFR AFRICAN AMERICAN: >60
GFR NON-AFRICAN AMERICAN: >60
GLUCOSE BLD-MCNC: 189 MG/DL (ref 70–99)
HCT VFR BLD CALC: 37.6 % (ref 40.5–52.5)
HEMOGLOBIN: 12.7 G/DL (ref 13.5–17.5)
LYMPHOCYTES ABSOLUTE: 0.9 K/UL (ref 1–5.1)
LYMPHOCYTES RELATIVE PERCENT: 16.8 %
MCH RBC QN AUTO: 29.7 PG (ref 26–34)
MCHC RBC AUTO-ENTMCNC: 33.8 G/DL (ref 31–36)
MCV RBC AUTO: 88 FL (ref 80–100)
MONOCYTES ABSOLUTE: 0.4 K/UL (ref 0–1.3)
MONOCYTES RELATIVE PERCENT: 7.4 %
NEUTROPHILS ABSOLUTE: 4.1 K/UL (ref 1.7–7.7)
NEUTROPHILS RELATIVE PERCENT: 74.2 %
PDW BLD-RTO: 15.5 % (ref 12.4–15.4)
PLATELET # BLD: 283 K/UL (ref 135–450)
PMV BLD AUTO: 7.9 FL (ref 5–10.5)
POTASSIUM SERPL-SCNC: 4.4 MMOL/L (ref 3.5–5.1)
PRO-BNP: 131 PG/ML (ref 0–124)
RBC # BLD: 4.28 M/UL (ref 4.2–5.9)
SEDIMENTATION RATE, ERYTHROCYTE: 23 MM/HR (ref 0–20)
SODIUM BLD-SCNC: 139 MMOL/L (ref 136–145)
TOTAL PROTEIN: 7.5 G/DL (ref 6.4–8.2)
WBC # BLD: 5.5 K/UL (ref 4–11)

## 2022-02-25 PROCEDURE — 83880 ASSAY OF NATRIURETIC PEPTIDE: CPT

## 2022-02-25 PROCEDURE — 80053 COMPREHEN METABOLIC PANEL: CPT

## 2022-02-25 PROCEDURE — 36415 COLL VENOUS BLD VENIPUNCTURE: CPT

## 2022-02-25 PROCEDURE — 83036 HEMOGLOBIN GLYCOSYLATED A1C: CPT

## 2022-02-25 PROCEDURE — 86140 C-REACTIVE PROTEIN: CPT

## 2022-02-25 PROCEDURE — 85652 RBC SED RATE AUTOMATED: CPT

## 2022-02-25 PROCEDURE — 85025 COMPLETE CBC W/AUTO DIFF WBC: CPT

## 2022-02-26 LAB
ESTIMATED AVERAGE GLUCOSE: 228.8 MG/DL
HBA1C MFR BLD: 9.6 %

## 2022-02-28 RX ORDER — ASPIRIN 81 MG/1
TABLET, CHEWABLE ORAL
Qty: 90 TABLET | Refills: 3 | Status: SHIPPED | OUTPATIENT
Start: 2022-02-28

## 2022-02-28 NOTE — TELEPHONE ENCOUNTER
[facility] called to get pts Asprin 81mg transferred [place] [facility].

## 2022-03-01 ENCOUNTER — OFFICE VISIT (OUTPATIENT)
Dept: CARDIOLOGY CLINIC | Age: 57
End: 2022-03-01
Payer: COMMERCIAL

## 2022-03-01 VITALS
WEIGHT: 184.5 LBS | BODY MASS INDEX: 26.41 KG/M2 | OXYGEN SATURATION: 97 % | HEIGHT: 70 IN | HEART RATE: 67 BPM | SYSTOLIC BLOOD PRESSURE: 134 MMHG | DIASTOLIC BLOOD PRESSURE: 80 MMHG

## 2022-03-01 DIAGNOSIS — I42.8 NON-ISCHEMIC CARDIOMYOPATHY (HCC): ICD-10-CM

## 2022-03-01 DIAGNOSIS — I10 ESSENTIAL HYPERTENSION: ICD-10-CM

## 2022-03-01 DIAGNOSIS — I47.29 NSVT (NONSUSTAINED VENTRICULAR TACHYCARDIA): ICD-10-CM

## 2022-03-01 DIAGNOSIS — I50.22 SYSTOLIC CHF, CHRONIC (HCC): Primary | ICD-10-CM

## 2022-03-01 DIAGNOSIS — D86.85 CARDIAC SARCOIDOSIS (HCC): ICD-10-CM

## 2022-03-01 DIAGNOSIS — R06.02 SOB (SHORTNESS OF BREATH): ICD-10-CM

## 2022-03-01 PROCEDURE — 99215 OFFICE O/P EST HI 40 MIN: CPT | Performed by: INTERNAL MEDICINE

## 2022-03-01 NOTE — PATIENT INSTRUCTIONS
Plan:  1. Continue cardiac medications as prescribed. 2. We will look into another pulmonologist due to Dr. Giovanna Spencer retiring if Dr Giovanna Spencer does not have a recommendation.    -Send us a nothingGrinder message and let us know what Dr Giovanna Spencer says next week at your office visit. 3. LABS: fasting lipids, BNP, CMP, CBC, Iron & TIBC in 3 months. 4. Follow up with me and EP team (EP NP) in 4 months- same day is possible.

## 2022-03-01 NOTE — PROGRESS NOTES
[address]  Advanced CHF/Pulmonary Hypertension   Cardiac Evaluation      Kenny Sandra  YOB: 1965    Date of Visit:  3/1/22     Chief Complaint   Patient presents with    Follow-up    Congestive Heart Failure    Atrial Fibrillation    Tachycardia     NSVT    Other     cardiac sarcoidosis    Cardiomyopathy         History of Present Illness: Jose Gunn is a 59 y.o. male who presents from referral from Dr Stacey Ley for consultation and management of advanced heart failure. He presented to hospital on 10/12/20 with complaints of severe SOB, BLE edema and chest pain. Echo from 10/13/20 demonstrated EF 20-25%, moderate biatrial enlargement, mild TR and CT. His LHC/RHC 10/14/20 demonstrated severe nonischemic cardiomyopathy, no significant CAD. His cardiac MRI from 12/04/20 showed NICM concerning for inflammatory or infiltrative disease with biventricular involvement. His PET scan from 12/11/20 showed abnormal FDG uptake in the lateral wall of the left ventricle and the right atrium consistent with active myocardial inflammation. Severe global HK and severely impaired LV function at 17.6%. His CT chest from 12/15/20 showed the heart is enlarged with a small pericardial effusion. He is going to see  pulmonology soon. Dr Stacey Ley has not had [facility] testing yet [facility] He reports he feels well overall. He has SOB on exertion and BLE edema which has been slowly improving. He reports he had increased fatigue for the last 1-2 years. He denies CP, palpitations, dizziness or syncope. He takes little naps during the day. He has been sleeping in his chair. He reports he was waking up feeling smothery. He has not slept in a bed since 09/2020. At his OV on 12/22/20, his lisinopril was stopped. He was started on Entresto 1/2 tab of 49/51 mg twice daily and digoxin 125 mcg daily. He reported he was sleeping when he experienced VT on 12/13/2020.  He had a 10.5 second episode of monomorphic VT with a 360 msec cycle length at 1:24PM. He states he woke up and his Lifevest showed an orange screen that said \"patient respond\", he pushed the button and went back to sleep. On 01/05/21 he reported DR Jc Dick said he was 70% positive for sarcoidosis. He has been taking prednisone 40 mg daily. DR Jc Dick started methotrexate. He reported he did not have any more alarms from his Life Vest. He is tolerating the entresto and digoxin well. He reports he feels better and has more energy. His weight is down almost 20 pounds. His glucose is elevated. He was started on spironolactone 25 mg M,W,F and his lasix was adjusted to 20 mg on M,W,F and 40 mg all other days at this OV. His limited echo from 01/19/21 showed his EF was 25%. On 02/16/21 he reported Dr. Jc Dick decreased his prednisone to 30 mg then in 1 month go to 20 mg daily. His SBP has been controlled at home in the 110-120s. He is taking entresto 1/2 tab 49/51 mg twice daily. He takes spironolactone 3 days a week. At this OV his entresto was increased to 1/2 tablet 49/51 mg in am and full tablet 49/51 mg in pm.   His muga scan from 03/24/21 his EF was 42.9%. Life Vest off 03/25/21. His cardiac monitor from 04/2021 showed moderate PVC burden of 11.49%. Echo 11/2021 demonstrated an LVEF of 30-35%. Muga 12/2021 demonstrated an LVEF of 35%. Today, patient reports he is recovering from shingles. He still has a rash but no open sores on his right neck and arm. He reports he is not vaccinated for COVID. He reports he did not wear the cardiac event monitor as recommended last visit because he was unable to obtain insurance information. He reports he is trying to be more active in his free time. His pulmonologist is retiring soon and he is unsure of who he will go to in the future. He reports Dr Jc Dick decreased his prednisone from 10mg to 5mg about 6 weeks ago.  Patient is taking spironolactone, digoxin, metoprolol, lopid, and Entresto as prescribed and tolerates them well. Patient denies current edema, chest pain, sob, palpitations, dizziness or syncope. No Known Allergies  Current Outpatient Medications   Medication Sig Dispense Refill    aspirin 81 MG chewable tablet CHEW ONE TABLET BY MOUTH DAILY 90 tablet 3    spironolactone (ALDACTONE) 25 MG tablet 25 mg on M,W,F 30 tablet 5    digoxin (LANOXIN) 125 MCG tablet Take 1 tablet by mouth daily 90 tablet 3    gemfibrozil (LOPID) 600 MG tablet Take 1 tablet by mouth 2 times daily (before meals) 180 tablet 3    furosemide (LASIX) 40 MG tablet 40mg daily on 2 days a week, then 20mg daily all other days 108 tablet 1    metoprolol succinate (TOPROL XL) 50 MG extended release tablet 0.5 tablet twice daily 90 tablet 3    predniSONE (DELTASONE) 20 MG tablet Take 5 mg by mouth daily       sacubitril-valsartan (ENTRESTO)  MG per tablet Take 1 tablet by mouth 2 times daily (Patient taking differently: Take 1 tablet by mouth 1/2 tab BID) 60 tablet 11    pantoprazole (PROTONIX) 40 MG tablet Take 1 tablet by mouth every morning (before breakfast) 90 tablet 3    methotrexate (RHEUMATREX) 2.5 MG chemo tablet Take by mouth 4 days weekly      folic acid (FOLVITE) 1 MG tablet Take 1 mg by mouth daily      Multiple Vitamins-Minerals (THERAPEUTIC MULTIVITAMIN-MINERALS) tablet Take 1 tablet by mouth daily      Blood Glucose Monitoring Suppl (Tresata BLOOD GLUCOSE KIT) KIT 1 each by Does not apply route daily 1 kit 0     No current facility-administered medications for this visit. History reviewed. No pertinent past medical history. History reviewed. No pertinent surgical history.   Family History   Problem Relation Age of Onset    Cancer Mother         skin     Cancer Father         skin, prostate     Cancer Sister         skin, Melanoma     Social History     Socioeconomic History    Marital status:      Spouse name: Not on file    Number of children: Not on file    Years of education: Not on file    Highest education level: Not on file   Occupational History    Not on file   Tobacco Use    Smoking status: Never Smoker    Smokeless tobacco: Never Used   Vaping Use    Vaping Use: Never used   Substance and Sexual Activity    Alcohol use: Not Currently    Drug use: No    Sexual activity: Not on file   Other Topics Concern    Not on file   Social History Narrative    Not on file     Social Determinants of Health     Financial Resource Strain:     Difficulty of Paying Living Expenses: Not on file   Food Insecurity:     Worried About Running Out of Food in the Last Year: Not on file    Luz of Food in the Last Year: Not on file   Transportation Needs:     Lack of Transportation (Medical): Not on file    Lack of Transportation (Non-Medical): Not on file   Physical Activity:     Days of Exercise per Week: Not on file    Minutes of Exercise per Session: Not on file   Stress:     Feeling of Stress : Not on file   Social Connections:     Frequency of Communication with Friends and Family: Not on file    Frequency of Social Gatherings with Friends and Family: Not on file    Attends [employer] Services: Not on file    Active Member of [address] [place] [address] or Organizations: Not on file    Attends Club or Organization Meetings: Not on file    Marital Status: Not on file   Intimate Partner Violence:     Fear of Current or Ex-Partner: Not on file    Emotionally Abused: Not on file    Physically Abused: Not on file    Sexually Abused: Not on file   Housing Stability:     Unable to Pay for Housing in the Last Year: Not on file    Number of [place] in the Last Year: Not on file    Unstable Housing in the Last Year: Not on file       Review of Systems:   · Constitutional: there has been no unanticipated weight loss. There's been no change in energy level, sleep pattern, or activity level. · Eyes: No visual changes or diplopia. No scleral icterus.   · ENT: No Headaches, hearing loss or vertigo. No mouth sores or sore throat. · Cardiovascular: Reviewed in HPI  · Respiratory: No cough or wheezing, no sputum production. No hematemesis. · Gastrointestinal: No abdominal pain, appetite loss, blood in stools. No change in bowel or bladder habits. · Genitourinary: No dysuria, trouble voiding, or hematuria. · Musculoskeletal:  No gait disturbance, weakness or joint complaints. · Integumentary: No rash or pruritis. · Neurological: No headache, diplopia, change in muscle strength, numbness or tingling. No change in gait, balance, coordination, mood, affect, memory, mentation, behavior. · Psychiatric: No anxiety, no depression. · Endocrine: No malaise, fatigue or temperature intolerance. No excessive thirst, fluid intake, or urination. No tremor. · Hematologic/Lymphatic: No abnormal bruising or bleeding, blood clots or swollen lymph nodes. · Allergic/Immunologic: No nasal congestion or hives. Physical Examination:    Vitals:    03/01/22 1125   BP: 134/80   Pulse: 67   SpO2: 97%   Weight: 184 lb 8 oz (83.7 kg)   Height: 5' 10\" (1.778 m)     Body mass index is 26.47 kg/m².      Wt Readings from Last 3 Encounters:   03/01/22 184 lb 8 oz (83.7 kg)   11/02/21 195 lb (88.5 kg)   08/10/21 193 lb (87.5 kg)     BP Readings from Last 3 Encounters:   03/01/22 134/80   11/02/21 118/60   08/10/21 134/82        Constitutional and General Appearance:   WD/WN in NAD  HEENT:  NC/AT  TAIME  No problems with hearing  Skin:  Warm, dry  Respiratory:  · Normal excursion and expansion without use of accessory muscles  · Resp Auscultation: Normal breath sounds without dullness  Cardiovascular:  · The apical impulses not displaced  · Heart tones are crisp and normal  · Cervical veins are not engorged  · The carotid upstroke is normal in amplitude and contour without delay or bruit  · JVP less than 8 cm H2O  RRR with nl S1 and S2 without m,r,g  · Peripheral pulses are symmetrical and full  · There is no clubbing, cyanosis of the extremities. · No edema  · Femoral Arteries: 2+ and equal  · Pedal Pulses: 2+ and equal   Neck:  · No thyromegaly  Abdomen:  · No masses or tenderness  · Liver/Spleen: No Abnormalities Noted  Neurological/Psychiatric:  · Alert and oriented in all spheres  · Moves all extremities well  · Exhibits normal gait balance and coordination  · No abnormalities of mood, affect, memory, mentation, or behavior are noted    Echo: 10/13/20  The left ventricular systolic function is severely reduced with an ejection   fraction of 20 - 25 %. There is hypokinesis of the apex, apical lateral, apical septum, anterior,   inferior, anteroseptum and apical anterior walls. Left ventricular cavity size is mildly dilated. Left ventricular diastolic filling pressure is elevated. Moderate mitral regurgitation. Mild Bi-atrial enlargement. The right ventricle is mildly enlarged. Right ventricular systolic function is mildly reduced . Mild tricuspid and pulmonic regurgitation. There is a left pleural effusion. Cardiac cath: 10/14/20  Findings:      LEFT HEART CATH  LM: ostial pinch (MLA by IVUS was >30)  LAD: luminals, luminals calcification  Ramus: luminals  LCX: Luminals  RCA: dominant, sluggish flow. KEVIN-2 flow. LVEDP: 15  LVEF: <20%, severe global hypokinesis. No MR, No AS        RIGHT HEART CATH  RA: 6   RV: 33/6   PA: 33/15   and mean of  22  PCWP: 15     Sats: on RA  Ao: 89%  RA:  59%  PA: 61%     CO/CI: 4.5/2.2 (stephanie)  SVR/PVR: 1272/122        Assessment  1. Severe nonischemic cardiomyopathy. LVEF less than 20%                -We will start secondary work-up                - will consider LifeVest  2. No significant CAD. Luminal calcification  3.  Relatively normal mildly elevated intracardiac filling pressures.        MRI card: 12/4/20  IMPRESSION: Findings are most consistent with non-ischemic cardiomyopathy concerning for inflammatory or infiltrative disease with biventricular involvement. Differential includes subacute myocarditis or an infiltrative process such as cardiac sarcoidosis. Recommend FDG PET for further evaluation for sarcoidosis. There is four chamber dilation with severely reduced biventricular systolic function. There is evidence of decompensated heart failure. PET: 12/11/20  IMPRESSION: 1. Abnormal FDG uptake in the lateral wall of the left ventricle, and the right atrium consistent with active myocardial inflammation. 2. Severely impaired left ventricular function with severe global hypokinesis. 3. Normal resting left ventricular perfusion. 4. Moderate bilateral pleural effusion. CT chest: 12/15/20  FINDINGS: Mediastinum: The heart is enlarged with a small pericardial effusion. Coronary artery calcifications are a marker of atherosclerosis. Scattered mediastinal lymph nodes are not pathologic by CT criteria. The lizbeth not be evaluated in the absence of intravenous contrast.  Lungs/pleura: The tracheobronchial tree is patent. There is no pneumothorax. There small to moderate bilateral pleural effusions with atelectasis, right greater than left. There is biapical scarring. There is mild bilateral interstitial thickening within the bases likely due to interstitial edema. Upper Abdomen: Images of the upper abdomen are unremarkable. Soft Tissues/Bones: Degenerative changes involve the thoracic spine. Limited echo: 01/19/21  Summary   Limited exam for LVEF. The left ventricular systolic function is moderate to severely reduced with   an ejection fraction of 25%. Global hypokinesis. Muga: 03/24/21  MUGA Conclusions  Abnormal resting left ventricular function with EF= 42.9% . Unable to  determine wall motion on current images. Life Vest discontinued. ECHO 11/2021:    Summary   The left ventricular systolic function is moderate to severely reduced with   an ejection fraction of 30-35 %.    Grade I diastolic dysfunction with normal filing pressure. Trace mitral, pulmonic and tricuspid regurgitation. MUGA 12/2021:      Erminio Angelucci Conclusions    Abnormal resting left ventricular function with moderate global hypokinesis    (but more pronounced in septum) and EF= 35% .         Labs were reviewed including labs from other hospital systems through [facility]. Cardiac testing was reviewed including echos, nuclear scans, cardiac catheterization, including from other hospital systems through [facility]. Assessment:    1. Systolic CHF, chronic ([facility] [address]    2. NSVT (nonsustained ventricular tachycardia) ([facility] [address]    3. Essential hypertension    4. Cardiac sarcoidosis ([facility] [address]    5. SOB (shortness of breath)    6. Non-ischemic cardiomyopathy (HCC)        MUGA SCANS WILL BETTER ESTIMATE EF DUE TO LBBB    Plan:  1. Continue cardiac medications as prescribed. 2. We will look into another pulmonologist due to Dr. Jeffrey Gary retiring if Dr Jeffrey Gary does not have a recommendation.    -Send us a [employer] message and let us know what Dr Jeffrey Gary says next week at your office visit. 3. LABS: fasting lipids, BNP, CMP, CBC, Iron & TIBC in 3 months. 4. Follow up with me and EP team (EP NP) in 4 months- same day is possible. This note has been scribed in the presence of Nohelia Jaime MD by José Miguel Conroy RN. The scribe's documentation has been prepared under my direction and personally reviewed by me in its entirety. I confirm that the note above accurately reflects all work, treatment, procedures, and medical decision making performed by me. Time Based Itemization  A total of 40 minutes was spent on today's patient encounter.   If applicable, non-patient-facing activities:  ( x)Preparing to see the patient and reviewing records  ( ) Individual interpretation of results  ( ) Discussion or coordination of care with other health care professionals  ( x) Ordering of unique tests, medications, or procedures  ( x) Documentation within the EHR    I appreciate the opportunity of cooperating in the care of this patient.     Slim Barber M.D., [facility] [place]

## 2022-04-06 DIAGNOSIS — K21.9 GASTROESOPHAGEAL REFLUX DISEASE WITHOUT ESOPHAGITIS: ICD-10-CM

## 2022-04-11 RX ORDER — PANTOPRAZOLE SODIUM 40 MG/1
TABLET, DELAYED RELEASE ORAL
Qty: 30 TABLET | Refills: 0 | Status: SHIPPED | OUTPATIENT
Start: 2022-04-11 | End: 2022-05-17

## 2022-05-13 ENCOUNTER — HOSPITAL ENCOUNTER (OUTPATIENT)
Age: 57
Discharge: HOME | End: 2022-05-13

## 2022-05-17 DIAGNOSIS — K21.9 GASTROESOPHAGEAL REFLUX DISEASE WITHOUT ESOPHAGITIS: ICD-10-CM

## 2022-05-17 RX ORDER — PANTOPRAZOLE SODIUM 40 MG/1
TABLET, DELAYED RELEASE ORAL
Qty: 90 TABLET | Refills: 0 | Status: SHIPPED | OUTPATIENT
Start: 2022-05-17 | End: 2022-08-03

## 2022-06-02 DIAGNOSIS — I50.22 SYSTOLIC CHF, CHRONIC (HCC): ICD-10-CM

## 2022-06-03 NOTE — TELEPHONE ENCOUNTER
LMOM for patient to call back to verify if patient is taking his entresto 1/2 tab BID or 1 tab BID.        OLMAN  OOT    Last OV 3/1/22 OLMAN  Upcoming appt 7/19/22 OLMAN

## 2022-06-08 NOTE — TELEPHONE ENCOUNTER
Murphy Carbajal from [facility] called to check the status of refill. Murphy Carbajal was advised that we were waiting on pt to call back with dosage.  Prescription [facility] be [facility] to [facility] [phone]

## 2022-06-09 RX ORDER — SACUBITRIL AND VALSARTAN 97; 103 MG/1; MG/1
TABLET, FILM COATED ORAL
Qty: 60 TABLET | Refills: 11 | Status: SHIPPED | OUTPATIENT
Start: 2022-06-09

## 2022-06-09 RX ORDER — SACUBITRIL AND VALSARTAN 97; 103 MG/1; MG/1
1 TABLET, FILM COATED ORAL 2 TIMES DAILY
Qty: 60 TABLET | Refills: 11 | Status: SHIPPED | OUTPATIENT
Start: 2022-06-09 | End: 2022-06-09 | Stop reason: SDUPTHER

## 2022-06-09 NOTE — TELEPHONE ENCOUNTER
LOV 3/01/2022 with CAMILO Snider MD  Pt reports taking Entresto 1/2 tab bid(49/51) according to last office note and phone call says entresto 1/2 tab bid. Next office visit 07/19/22  Lab work had bmp 8/26/2021    Please make sure rx is correct with what you want.

## 2022-06-29 DIAGNOSIS — I10 ESSENTIAL HYPERTENSION: Primary | ICD-10-CM

## 2022-06-29 RX ORDER — METOPROLOL SUCCINATE 50 MG/1
TABLET, EXTENDED RELEASE ORAL
Qty: 90 TABLET | Refills: 3 | Status: SHIPPED | OUTPATIENT
Start: 2022-06-29

## 2022-07-12 NOTE — PROGRESS NOTES
[address]  Advanced CHF/Pulmonary Hypertension   Cardiac Evaluation      Nathanael Sandra  YOB: 1965    Date of Visit:  7/19/22     Chief Complaint   Patient presents with    Follow-up    Congestive Heart Failure          History of Present Illness: Scott Looney is a 59 y.o. male who presents from referral from Dr Lambert Rayo for consultation and management of advanced heart failure. He presented to hospital on 10/12/20 with complaints of severe SOB, BLE edema and chest pain. Echo from 10/13/20 demonstrated EF 20-25%, moderate biatrial enlargement, mild TR and WV. His LHC/RHC 10/14/20 demonstrated severe nonischemic cardiomyopathy, no significant CAD. His cardiac MRI from 12/04/20 showed NICM concerning for inflammatory or infiltrative disease with biventricular involvement. His PET scan from 12/11/20 showed abnormal FDG uptake in the lateral wall of the left ventricle and the right atrium consistent with active myocardial inflammation. Severe global HK and severely impaired LV function at 17.6%. His CT chest from 12/15/20 showed the heart is enlarged with a small pericardial effusion. He is going to see  pulmonology soon. Dr Lambert Raoy has not had [facility] yet [facility] He reports he feels well overall. He has SOB on exertion and BLE edema which has been slowly improving. He reports he had increased fatigue for the last 1-2 years. He denies CP, palpitations, dizziness or syncope. He takes little naps during the day. He has been sleeping in his chair. He reports he was waking up feeling smothery. He has not slept in a bed since 09/2020. At his OV on 12/22/20, his lisinopril was stopped. He was started on Entresto 1/2 tab of 49/51 mg twice daily and digoxin 125 mcg daily. He reported he was sleeping when he experienced VT on 12/13/2020.  He had a 10.5 second episode of monomorphic VT with a 360 msec cycle length at 1:24PM. He states he woke up and his Lifevest showed an orange screen that said \"patient respond\", he pushed the button and went back to sleep. On 01/05/21 he reported DR Gayathri Martinez said he was 70% positive for sarcoidosis. He has been taking prednisone 40 mg daily. DR Gayathri Martinez started methotrexate. He reported he did not have any more alarms from his Life Vest. He is tolerating the entresto and digoxin well. He reports he feels better and has more energy. His weight is down almost 20 pounds. His glucose is elevated. He was started on spironolactone 25 mg M,W,F and his lasix was adjusted to 20 mg on M,W,F and 40 mg all other days at this OV. His limited echo from 01/19/21 showed his EF was 25%. On 02/16/21 he reported Dr. Gayathri Martinez decreased his prednisone to 30 mg then in 1 month go to 20 mg daily. His SBP has been controlled at home in the 110-120s. He is taking entresto 1/2 tab 49/51 mg twice daily. He takes spironolactone 3 days a week. At this OV his entresto was increased to 1/2 tablet 49/51 mg in am and full tablet 49/51 mg in pm.   His muga scan from 03/24/21 his EF was 42.9%. Life Vest off 03/25/21. His cardiac monitor from 04/2021 showed moderate PVC burden of 11.49%. Echo 11/2021 demonstrated an LVEF of 30-35%. Muga 12/2021 demonstrated an LVEF of 35%. OV 3/1/2022 patient reported recovering from shingles. He reported he is not vaccinated for COVID. He reported he did not wear the cardiac event monitor as recommended last visit because he was unable to obtain insurance information. Patient had a Cardiac PET Scan 4/1/2022 that didn't show any active myocardial inflammation, left ventricular dysfunction with resting perfusion defects in the septum, apex, and inferior wall. Today, 7/19/2022, patient states he is overall feeling well today. Patient states Dr. Gayathri Martinez retired. He states he has been taking 5mg of Prednisone for about 3 months, has been tapering down. He handles the decrease in mg well, doesn't have major side effects.  He states he has been on mycophenolate since March due to elevated liver enzymes while on methotrexate. Patient denies current edema, chest pain, sob, palpitations, dizziness or syncope. He is still active with yard work and tolerates this well. He had a repeat PET scan showing no inflammation compared to the last one. No Known Allergies  Current Outpatient Medications   Medication Sig Dispense Refill    mycophenolate (CELLCEPT) 500 MG tablet TAKE 1 TABLET BY MOUTH TWICE DAILY      furosemide (LASIX) 40 MG tablet 40mg daily on 2 days a week, then 20mg daily all other days 108 tablet 2    metoprolol succinate (TOPROL XL) 50 MG extended release tablet Take 1/2 (one-half) tablet by mouth twice daily 90 tablet 3    sacubitril-valsartan (ENTRESTO)  MG per tablet 1/2 tablet by mouth twice a day 60 tablet 11    pantoprazole (PROTONIX) 40 MG tablet TAKE 1 TABLET BY MOUTH IN THE MORNING BEFORE BREAKFAST 90 tablet 0    aspirin 81 MG chewable tablet CHEW ONE TABLET BY MOUTH DAILY 90 tablet 3    spironolactone (ALDACTONE) 25 MG tablet 25 mg on M,W,F 30 tablet 5    digoxin (LANOXIN) 125 MCG tablet Take 1 tablet by mouth daily 90 tablet 3    gemfibrozil (LOPID) 600 MG tablet Take 1 tablet by mouth 2 times daily (before meals) 180 tablet 3    predniSONE (DELTASONE) 20 MG tablet Take 5 mg by mouth daily       folic acid (FOLVITE) 1 MG tablet Take 1 mg by mouth daily      Multiple Vitamins-Minerals (THERAPEUTIC MULTIVITAMIN-MINERALS) tablet Take 1 tablet by mouth daily      Blood Glucose Monitoring Suppl (Sunfun InfoOGE BLOOD GLUCOSE KIT) KIT 1 each by Does not apply route daily 1 kit 0     No current facility-administered medications for this visit. History reviewed. No pertinent past medical history. History reviewed. No pertinent surgical history.   Family History   Problem Relation Age of Onset    Cancer Mother         skin     Cancer Father         skin, prostate     Cancer Sister         skin, Melanoma     Social nodes. Allergic/Immunologic: No nasal congestion or hives. Physical Examination:    Vitals:    07/19/22 0830   BP: 124/80   Pulse: 59   SpO2: 98%   Weight: 186 lb (84.4 kg)   Height: 5' 10\" (1.778 m)     Body mass index is 26.69 kg/m². Wt Readings from Last 3 Encounters:   07/19/22 186 lb (84.4 kg)   03/01/22 184 lb 8 oz (83.7 kg)   11/02/21 195 lb (88.5 kg)     BP Readings from Last 3 Encounters:   07/19/22 124/80   03/01/22 134/80   11/02/21 118/60       Constitutional and General Appearance:   WD/WN in NAD  HEENT:  NC/AT  TAMIE  No problems with hearing  Skin:  Warm, dry  Respiratory:  Normal excursion and expansion without use of accessory muscles  Resp Auscultation: Normal breath sounds without dullness  Cardiovascular: The apical impulses not displaced  Heart tones are crisp and normal  Cervical veins are not engorged  The carotid upstroke is normal in amplitude and contour without delay or bruit  JVP less than 8 cm H2O  RRR with nl S1 and S2 without m,r,g  Peripheral pulses are symmetrical and full  There is no clubbing, cyanosis of the extremities. No edema  Femoral Arteries: 2+ and equal  Pedal Pulses: 2+ and equal   Neck:  No thyromegaly  Abdomen:  No masses or tenderness  Liver/Spleen: No Abnormalities Noted  Neurological/Psychiatric:  Alert and oriented in all spheres  Moves all extremities well  Exhibits normal gait balance and coordination  No abnormalities of mood, affect, memory, mentation, or behavior are noted    Echo: 10/13/20  The left ventricular systolic function is severely reduced with an ejection   fraction of 20 - 25 %. There is hypokinesis of the apex, apical lateral, apical septum, anterior,   inferior, anteroseptum and apical anterior walls. Left ventricular cavity size is mildly dilated. Left ventricular diastolic filling pressure is elevated. Moderate mitral regurgitation. Mild Bi-atrial enlargement. The right ventricle is mildly enlarged.    Right ventricular systolic function is mildly reduced . Mild tricuspid and pulmonic regurgitation. There is a left pleural effusion. Cardiac cath: 10/14/20  Findings:      LEFT HEART CATH  LM: ostial pinch (MLA by IVUS was >30)  LAD: luminals, luminals calcification  Ramus: luminals  LCX: Luminals  RCA: dominant, sluggish flow. KEVIN-2 flow. LVEDP: 15  LVEF: <20%, severe global hypokinesis. No MR, No AS        RIGHT HEART CATH  RA: 6   RV: 33/6   PA: 33/15   and mean of  22  PCWP: 15     Sats: on RA  Ao: 89%  RA:  59%  PA: 61%     CO/CI: 4.5/2.2 (stephanie)  SVR/PVR: 1272/122        Assessment  1. Severe nonischemic cardiomyopathy. LVEF less than 20%                -We will start secondary work-up                - will consider LifeVest  2. No significant CAD. Luminal calcification  3.  Relatively normal mildly elevated intracardiac filling pressures. MRI card: 12/4/20  IMPRESSION: Findings are most consistent with non-ischemic cardiomyopathy concerning for inflammatory or infiltrative disease with biventricular involvement. Differential includes subacute myocarditis or an infiltrative process such as cardiac sarcoidosis. Recommend FDG PET for further evaluation for sarcoidosis. There is four chamber dilation with severely reduced biventricular systolic function. There is evidence of decompensated heart failure. PET: 12/11/20  IMPRESSION: 1. Abnormal FDG uptake in the lateral wall of the left ventricle, and the right atrium consistent with active myocardial inflammation. 2. Severely impaired left ventricular function with severe global hypokinesis. 3. Normal resting left ventricular perfusion. 4. Moderate bilateral pleural effusion. CT chest: 12/15/20  FINDINGS: Mediastinum: The heart is enlarged with a small pericardial effusion. Coronary artery calcifications are a marker of atherosclerosis. Scattered mediastinal lymph nodes are not pathologic by CT criteria.   The lizbeth not be evaluated in the absence of intravenous contrast.  Lungs/pleura: The tracheobronchial tree is patent. There is no pneumothorax. There small to moderate bilateral pleural effusions with atelectasis, right greater than left. There is biapical scarring. There is mild bilateral interstitial thickening within the bases likely due to interstitial edema. Upper Abdomen: Images of the upper abdomen are unremarkable. Soft Tissues/Bones: Degenerative changes involve the thoracic spine. Limited echo: 01/19/21  Summary   Limited exam for LVEF. The left ventricular systolic function is moderate to severely reduced with   an ejection fraction of 25%. Global hypokinesis. Muga: 03/24/21  MUGA Conclusions  Abnormal resting left ventricular function with EF= 42.9% . Unable to  determine wall motion on current images. Life Vest discontinued. ECHO 11/2021:    Summary   The left ventricular systolic function is moderate to severely reduced with   an ejection fraction of 30-35 %. Grade I diastolic dysfunction with normal filing pressure. Trace mitral, pulmonic and tricuspid regurgitation. MUGA 12/2021:       MUGA Conclusions    Abnormal resting left ventricular function with moderate global hypokinesis    (but more pronounced in septum) and EF= 35% . Cardiac PET 4/1/2022 @ [facility]:  IMPRESSION:   1. No FDG uptake within the myocardium to indicate active myocardial inflammation/granulomatous disease. 2.  No FDG uptake elsewhere in the imaged body to indicate active inflammation/granulomatous disease. 3.  Left ventricular dysfunction with resting perfusion defects in the septum, apex, and inferior wall. Labs were reviewed including labs from other hospital systems through [facility]. Cardiac testing was reviewed including echos, nuclear scans, cardiac catheterization, including from other hospital systems through [facility].     Most recent Lipids 5/16/2022(care everywhere): Chloes-222 LDL-153 BQD-[id number]    Assessment:    1. Systolic CHF, chronic ([facility]    2. Cardiac sarcoidosis ([facility]    3. Non-ischemic cardiomyopathy ([facility]    4. Essential hypertension    5. NSVT (nonsustained ventricular tachycardia) ([facility])    6. Left bundle branch block       MUGA SCANS WILL BETTER ESTIMATE EF DUE TO LBBB    Plan:  1. START Lipitor(Atorvastatin) 10mg once a day for your cholesterol   2. Obtain lab work as previously ordered-BNP, CMP, CBC, Iron  3. Stay on Prednisone 5mg for a total of 6 months   -In September cut back to taking 1 tablet(5mg) 4 days a week(Monday, Wednesday, Friday, Sunday)  4. Follow up with me in October       Scribe's attestation: This note was scribed in the presence of Emily Bacon MD by Adolph Snyder RN      The scribe's documentation has been prepared under my direction and personally reviewed by me in its entirety. I confirm that the note above accurately reflects all work, treatment, procedures, and medical decision making performed by me. Time Based Itemization  A total of 40 minutes was spent on today's patient encounter. If applicable, non-patient-facing activities:  ( x)Preparing to see the patient and reviewing records  ( ) Individual interpretation of results  ( ) Discussion or coordination of care with other health care professionals  ( x) Ordering of unique tests, medications, or procedures  ( x) Documentation within the EHR     I appreciate the opportunity of cooperating in the care of this patient.     Shelly Romero M.D., [facility]

## 2022-07-18 RX ORDER — FUROSEMIDE 40 MG/1
TABLET ORAL
Qty: 108 TABLET | Refills: 2 | Status: SHIPPED | OUTPATIENT
Start: 2022-07-18

## 2022-07-18 NOTE — TELEPHONE ENCOUNTER
Refill  furosemide (LASIX) 40 MG tablet     [facility] [employer] [employer] [address] [facility]

## 2022-07-19 ENCOUNTER — HOSPITAL ENCOUNTER (OUTPATIENT)
Age: 57
Discharge: HOME | End: 2022-07-19
Payer: COMMERCIAL

## 2022-07-19 ENCOUNTER — OFFICE VISIT (OUTPATIENT)
Dept: CARDIOLOGY CLINIC | Age: 57
End: 2022-07-19
Payer: COMMERCIAL

## 2022-07-19 VITALS
HEART RATE: 59 BPM | HEIGHT: 70 IN | OXYGEN SATURATION: 98 % | DIASTOLIC BLOOD PRESSURE: 80 MMHG | SYSTOLIC BLOOD PRESSURE: 124 MMHG | WEIGHT: 186 LBS | BODY MASS INDEX: 26.63 KG/M2

## 2022-07-19 DIAGNOSIS — I50.22 SYSTOLIC CHF, CHRONIC (HCC): Primary | ICD-10-CM

## 2022-07-19 DIAGNOSIS — I44.7 LEFT BUNDLE BRANCH BLOCK: ICD-10-CM

## 2022-07-19 DIAGNOSIS — I47.29 NSVT (NONSUSTAINED VENTRICULAR TACHYCARDIA): ICD-10-CM

## 2022-07-19 DIAGNOSIS — I50.22 SYSTOLIC CHF, CHRONIC (HCC): ICD-10-CM

## 2022-07-19 DIAGNOSIS — D86.85 CARDIAC SARCOIDOSIS (HCC): ICD-10-CM

## 2022-07-19 DIAGNOSIS — I10 ESSENTIAL HYPERTENSION: ICD-10-CM

## 2022-07-19 DIAGNOSIS — I42.8 NON-ISCHEMIC CARDIOMYOPATHY (HCC): ICD-10-CM

## 2022-07-19 LAB
A/G RATIO: 2.2 (ref 1.1–2.2)
ALBUMIN SERPL-MCNC: 5.1 G/DL (ref 3.4–5)
ALP BLD-CCNC: 67 U/L (ref 40–129)
ALT SERPL-CCNC: 27 U/L (ref 10–40)
ANION GAP SERPL CALCULATED.3IONS-SCNC: 11 MMOL/L (ref 3–16)
AST SERPL-CCNC: 19 U/L (ref 15–37)
BASOPHILS ABSOLUTE: 0 K/UL (ref 0–0.2)
BASOPHILS RELATIVE PERCENT: 0.6 %
BILIRUB SERPL-MCNC: 0.3 MG/DL (ref 0–1)
BUN BLDV-MCNC: 18 MG/DL (ref 7–20)
CALCIUM SERPL-MCNC: 10.1 MG/DL (ref 8.3–10.6)
CHLORIDE BLD-SCNC: 102 MMOL/L (ref 99–110)
CO2: 27 MMOL/L (ref 21–32)
CREAT SERPL-MCNC: 1 MG/DL (ref 0.9–1.3)
EOSINOPHILS ABSOLUTE: 0.1 K/UL (ref 0–0.6)
EOSINOPHILS RELATIVE PERCENT: 2.2 %
GFR AFRICAN AMERICAN: >60
GFR NON-AFRICAN AMERICAN: >60
GLUCOSE BLD-MCNC: 138 MG/DL (ref 70–99)
HCT VFR BLD CALC: 39.9 % (ref 40.5–52.5)
HEMOGLOBIN: 13.3 G/DL (ref 13.5–17.5)
IRON SATURATION: 34 % (ref 20–50)
IRON: 137 UG/DL (ref 59–158)
LYMPHOCYTES ABSOLUTE: 1 K/UL (ref 1–5.1)
LYMPHOCYTES RELATIVE PERCENT: 20.2 %
MCH RBC QN AUTO: 28.4 PG (ref 26–34)
MCHC RBC AUTO-ENTMCNC: 33.5 G/DL (ref 31–36)
MCV RBC AUTO: 85 FL (ref 80–100)
MONOCYTES ABSOLUTE: 0.4 K/UL (ref 0–1.3)
MONOCYTES RELATIVE PERCENT: 8.2 %
NEUTROPHILS ABSOLUTE: 3.3 K/UL (ref 1.7–7.7)
NEUTROPHILS RELATIVE PERCENT: 68.8 %
PDW BLD-RTO: 13.2 % (ref 12.4–15.4)
PLATELET # BLD: 237 K/UL (ref 135–450)
PMV BLD AUTO: 8.3 FL (ref 5–10.5)
POTASSIUM SERPL-SCNC: 4.9 MMOL/L (ref 3.5–5.1)
PRO-BNP: 255 PG/ML (ref 0–124)
RBC # BLD: 4.69 M/UL (ref 4.2–5.9)
SODIUM BLD-SCNC: 140 MMOL/L (ref 136–145)
TOTAL IRON BINDING CAPACITY: 403 UG/DL (ref 260–445)
TOTAL PROTEIN: 7.4 G/DL (ref 6.4–8.2)
WBC # BLD: 4.8 K/UL (ref 4–11)

## 2022-07-19 PROCEDURE — 36415 COLL VENOUS BLD VENIPUNCTURE: CPT

## 2022-07-19 PROCEDURE — 85025 COMPLETE CBC W/AUTO DIFF WBC: CPT

## 2022-07-19 PROCEDURE — 80053 COMPREHEN METABOLIC PANEL: CPT

## 2022-07-19 PROCEDURE — 83540 ASSAY OF IRON: CPT

## 2022-07-19 PROCEDURE — 83880 ASSAY OF NATRIURETIC PEPTIDE: CPT

## 2022-07-19 PROCEDURE — 83550 IRON BINDING TEST: CPT

## 2022-07-19 PROCEDURE — 99215 OFFICE O/P EST HI 40 MIN: CPT | Performed by: INTERNAL MEDICINE

## 2022-07-19 RX ORDER — MYCOPHENOLATE MOFETIL 500 MG/1
TABLET ORAL
COMMUNITY
Start: 2022-06-29 | End: 2022-09-12 | Stop reason: SDUPTHER

## 2022-07-19 RX ORDER — ATORVASTATIN CALCIUM 10 MG/1
10 TABLET, FILM COATED ORAL DAILY
Qty: 30 TABLET | Refills: 5 | Status: SHIPPED | OUTPATIENT
Start: 2022-07-19 | End: 2022-10-18

## 2022-07-19 NOTE — PATIENT INSTRUCTIONS
Plan: 1. START Lipitor(Atorvastatin) 10mg once a day for your cholesterol   2. Obtain lab work as previously ordered-BNP, CMP, CBC, Iron  3. Stay on Prednisone 5mg for a total of 6 months   -In September cut back to taking 1 tablet(5mg) 4 days a week(Monday, Wednesday, Friday, Sunday)  4.  Follow up with me in October

## 2022-07-20 ENCOUNTER — TELEPHONE (OUTPATIENT)
Dept: CARDIOLOGY CLINIC | Age: 57
End: 2022-07-20

## 2022-07-20 NOTE — TELEPHONE ENCOUNTER
Hans Grewal from [facility] stated that they received a prescription for Atorvastatin 10mg and they wanted to make kirsten aware that if pt is on Gemfibrozil 600mg it would be an increase reaction with the Atorvastatin. Pt would be an increase risk for side effects. Hans Grewal would like a call back to verify that kirsten wants pt on the Atorvastatin. Please advise.

## 2022-07-30 DIAGNOSIS — K21.9 GASTROESOPHAGEAL REFLUX DISEASE WITHOUT ESOPHAGITIS: ICD-10-CM

## 2022-08-03 RX ORDER — PANTOPRAZOLE SODIUM 40 MG/1
TABLET, DELAYED RELEASE ORAL
Qty: 90 TABLET | Refills: 3 | Status: SHIPPED | OUTPATIENT
Start: 2022-08-03

## 2022-09-12 ENCOUNTER — TELEPHONE (OUTPATIENT)
Dept: CARDIOLOGY CLINIC | Age: 57
End: 2022-09-12

## 2022-09-12 RX ORDER — MYCOPHENOLATE MOFETIL 500 MG/1
TABLET ORAL
Qty: 180 TABLET | Refills: 3 | Status: SHIPPED | OUTPATIENT
Start: 2022-09-12

## 2022-10-11 ENCOUNTER — TELEPHONE (OUTPATIENT)
Dept: CARDIOLOGY CLINIC | Age: 57
End: 2022-10-11

## 2022-10-11 DIAGNOSIS — I42.8 NON-ISCHEMIC CARDIOMYOPATHY (HCC): ICD-10-CM

## 2022-10-11 DIAGNOSIS — D86.85 CARDIAC SARCOIDOSIS (HCC): Primary | ICD-10-CM

## 2022-10-11 NOTE — TELEPHONE ENCOUNTER
Lexis Golden from the [facility] lab stated that pt came into get lab work done prior to his 10/18/22 kirsten ov. According to last ov note on 07/19/2022 pt has already gotten that lab work completed? If pt needs lab work prior to ov please reach out to pt. Thanks.

## 2022-10-14 ENCOUNTER — HOSPITAL ENCOUNTER (OUTPATIENT)
Age: 57
Discharge: HOME | End: 2022-10-14
Payer: COMMERCIAL

## 2022-10-14 DIAGNOSIS — D86.85 CARDIAC SARCOIDOSIS (HCC): ICD-10-CM

## 2022-10-14 DIAGNOSIS — I42.8 NON-ISCHEMIC CARDIOMYOPATHY (HCC): ICD-10-CM

## 2022-10-14 LAB
A/G RATIO: 2.4 (ref 1.1–2.2)
ALBUMIN SERPL-MCNC: 5 G/DL (ref 3.4–5)
ALP BLD-CCNC: 63 U/L (ref 40–129)
ALT SERPL-CCNC: 27 U/L (ref 10–40)
ANION GAP SERPL CALCULATED.3IONS-SCNC: 12 MMOL/L (ref 3–16)
AST SERPL-CCNC: 24 U/L (ref 15–37)
BILIRUB SERPL-MCNC: 0.5 MG/DL (ref 0–1)
BUN BLDV-MCNC: 16 MG/DL (ref 7–20)
C-REACTIVE PROTEIN: <3 MG/L (ref 0–5.1)
CALCIUM SERPL-MCNC: 9.8 MG/DL (ref 8.3–10.6)
CHLORIDE BLD-SCNC: 102 MMOL/L (ref 99–110)
CO2: 25 MMOL/L (ref 21–32)
CREAT SERPL-MCNC: 1 MG/DL (ref 0.9–1.3)
GFR AFRICAN AMERICAN: >60
GFR NON-AFRICAN AMERICAN: >60
GLUCOSE BLD-MCNC: 115 MG/DL (ref 70–99)
HCT VFR BLD CALC: 37.9 % (ref 40.5–52.5)
HEMOGLOBIN: 12.6 G/DL (ref 13.5–17.5)
MCH RBC QN AUTO: 28.2 PG (ref 26–34)
MCHC RBC AUTO-ENTMCNC: 33.4 G/DL (ref 31–36)
MCV RBC AUTO: 84.4 FL (ref 80–100)
PDW BLD-RTO: 14 % (ref 12.4–15.4)
PLATELET # BLD: 226 K/UL (ref 135–450)
PMV BLD AUTO: 8.8 FL (ref 5–10.5)
POTASSIUM SERPL-SCNC: 4.6 MMOL/L (ref 3.5–5.1)
PRO-BNP: 105 PG/ML (ref 0–124)
RBC # BLD: 4.49 M/UL (ref 4.2–5.9)
SEDIMENTATION RATE, ERYTHROCYTE: 7 MM/HR (ref 0–20)
SODIUM BLD-SCNC: 139 MMOL/L (ref 136–145)
TOTAL PROTEIN: 7.1 G/DL (ref 6.4–8.2)
WBC # BLD: 3.7 K/UL (ref 4–11)

## 2022-10-14 PROCEDURE — 86140 C-REACTIVE PROTEIN: CPT

## 2022-10-14 PROCEDURE — 83880 ASSAY OF NATRIURETIC PEPTIDE: CPT

## 2022-10-14 PROCEDURE — 36415 COLL VENOUS BLD VENIPUNCTURE: CPT

## 2022-10-14 PROCEDURE — 85652 RBC SED RATE AUTOMATED: CPT

## 2022-10-14 PROCEDURE — 80053 COMPREHEN METABOLIC PANEL: CPT

## 2022-10-14 PROCEDURE — 85027 COMPLETE CBC AUTOMATED: CPT

## 2022-10-17 NOTE — PROGRESS NOTES
[facility]  Advanced CHF/Pulmonary Hypertension   Cardiac Evaluation      Alisa Sandra  YOB: 1965    Date of Visit:  10/18/22     Chief Complaint   Patient presents with    Follow-up    Congestive Heart Failure            History of Present Illness: Florinda Davis is a 59 y.o. male who presents from referral from Dr Arsen Mendez for consultation and management of advanced heart failure. He presented to hospital on 10/12/20 with complaints of severe SOB, BLE edema and chest pain. Echo from 10/13/20 demonstrated EF 20-25%, moderate biatrial enlargement, mild TR and NE. His LHC/RHC 10/14/20 demonstrated severe nonischemic cardiomyopathy, no significant CAD. His cardiac MRI from 12/04/20 showed NICM concerning for inflammatory or infiltrative disease with biventricular involvement. His PET scan from 12/11/20 showed abnormal FDG uptake in the lateral wall of the left ventricle and the right atrium consistent with active myocardial inflammation. Severe global HK and severely impaired LV function at 17.6%. His CT chest from 12/15/20 showed the heart is enlarged with a small pericardial effusion. He is going to see  pulmonology soon. Dr Arsen Mendez has not had [facility] yet [facility] He reports he feels well overall. He has SOB on exertion and BLE edema which has been slowly improving. He reports he had increased fatigue for the last 1-2 years. He denies CP, palpitations, dizziness or syncope. He takes little naps during the day. He has been sleeping in his chair. He reports he was waking up feeling smothery. He has not slept in a bed since 09/2020. At his OV on 12/22/20, his lisinopril was stopped. He was started on Entresto 1/2 tab of 49/51 mg twice daily and digoxin 125 mcg daily. He reported he was sleeping when he experienced VT on 12/13/2020.  He had a 10.5 second episode of monomorphic VT with a 360 msec cycle length at 1:24PM. He states he woke up and his Lifevest showed an orange screen that said \"patient respond\", he pushed the button and went back to sleep. On 01/05/21 he reported DR Chau Ruiz said he was 70% positive for sarcoidosis. He has been taking prednisone 40 mg daily. DR Chau Ruiz started methotrexate. He reported he did not have any more alarms from his Life Vest. He is tolerating the entresto and digoxin well. He reports he feels better and has more energy. His weight is down almost 20 pounds. His glucose is elevated. He was started on spironolactone 25 mg M,W,F and his lasix was adjusted to 20 mg on M,W,F and 40 mg all other days at this OV. His limited echo from 01/19/21 showed his EF was 25%. On 02/16/21 he reported Dr. Chau Ruiz decreased his prednisone to 30 mg then in 1 month go to 20 mg daily. His SBP has been controlled at home in the 110-120s. He is taking entresto 1/2 tab 49/51 mg twice daily. He takes spironolactone 3 days a week. At this OV his entresto was increased to 1/2 tablet 49/51 mg in am and full tablet 49/51 mg in pm.   His muga scan from 03/24/21 his EF was 42.9%. Life Vest off 03/25/21. His cardiac monitor from 04/2021 showed moderate PVC burden of 11.49%. Echo 11/2021 demonstrated an LVEF of 30-35%. Muga 12/2021 demonstrated an LVEF of 35%. OV 3/1/2022 patient reported recovering from shingles. He reported he is not vaccinated for COVID. He reported he did not wear the cardiac event monitor as recommended last visit because he was unable to obtain insurance information. Patient had a Cardiac PET Scan 4/1/2022 that didn't show any active myocardial inflammation, left ventricular dysfunction with resting perfusion defects in the septum, apex, and inferior wall. OV, 7/19/2022, Patient states Dr. Chau Ruiz retired. He states he has been taking 5mg of Prednisone for about 3 months, has been tapering down. He handles the decrease in mg well, doesn't have major side effects.  He states he has been on mycophenolate since March due to elevated liver enzymes while on methotrexate. He had a repeat PET scan showing no inflammation compared to the last one. Today, 10/18/2022, he says he his doing well. He is taking prednisone four days a week. Patient is taking all cardiac medications as prescribed and tolerates them well. Patient denies current edema, chest pain, sob, palpitations, dizziness or syncope. No Known Allergies  Current Outpatient Medications   Medication Sig Dispense Refill    mycophenolate (CELLCEPT) 500 MG tablet TAKE 1 TABLET BY MOUTH TWICE DAILY 180 tablet 3    pantoprazole (PROTONIX) 40 MG tablet TAKE 1 TABLET BY MOUTH IN THE MORNING BEFORE BREAKFAST 90 tablet 3    furosemide (LASIX) 40 MG tablet 40mg daily on 2 days a week, then 20mg daily all other days 108 tablet 2    metoprolol succinate (TOPROL XL) 50 MG extended release tablet Take 1/2 (one-half) tablet by mouth twice daily 90 tablet 3    sacubitril-valsartan (ENTRESTO)  MG per tablet 1/2 tablet by mouth twice a day 60 tablet 11    aspirin 81 MG chewable tablet CHEW ONE TABLET BY MOUTH DAILY 90 tablet 3    spironolactone (ALDACTONE) 25 MG tablet 25 mg on M,W,F 30 tablet 5    digoxin (LANOXIN) 125 MCG tablet Take 1 tablet by mouth daily 90 tablet 3    gemfibrozil (LOPID) 600 MG tablet Take 1 tablet by mouth 2 times daily (before meals) 180 tablet 3    predniSONE (DELTASONE) 20 MG tablet Take 5 mg by mouth daily Only taking four days a week      folic acid (FOLVITE) 1 MG tablet Take 1 mg by mouth daily      Multiple Vitamins-Minerals (THERAPEUTIC MULTIVITAMIN-MINERALS) tablet Take 1 tablet by mouth daily      atorvastatin (LIPITOR) 10 MG tablet Take 1 tablet by mouth in the morning. (Patient not taking: Reported on 10/18/2022) 30 tablet 5    Blood Glucose Monitoring Suppl (HomeLightR BLOOD GLUCOSE KIT) KIT 1 each by Does not apply route daily 1 kit 0     No current facility-administered medications for this visit. History reviewed.  No pertinent past medical history. History reviewed. No pertinent surgical history. Family History   Problem Relation Age of Onset    Cancer Mother         skin     Cancer Father         skin, prostate     Cancer Sister         skin, Melanoma     Social History     Socioeconomic History    Marital status:      Spouse name: Not on file    Number of children: Not on file    Years of education: Not on file    Highest education level: Not on file   Occupational History    Not on file   Tobacco Use    Smoking status: Never    Smokeless tobacco: Never   Vaping Use    Vaping Use: Never used   Substance and Sexual Activity    Alcohol use: Not Currently    Drug use: No    Sexual activity: Not on file   Other Topics Concern    Not on file   Social History Narrative    Not on file     Social Determinants of Health     Financial Resource Strain: Not on file   Food Insecurity: Not on file   Transportation Needs: Not on file   Physical Activity: Not on file   Stress: Not on file   Social Connections: Not on file   Intimate Partner Violence: Not on file   Housing Stability: Not on file       Review of Systems:   Constitutional: there has been no unanticipated weight loss. There's been no change in energy level, sleep pattern, or activity level. Eyes: No visual changes or diplopia. No scleral icterus. ENT: No Headaches, hearing loss or vertigo. No mouth sores or sore throat. Cardiovascular: Reviewed in HPI  Respiratory: No cough or wheezing, no sputum production. No hematemesis. Gastrointestinal: No abdominal pain, appetite loss, blood in stools. No change in bowel or bladder habits. Genitourinary: No dysuria, trouble voiding, or hematuria. Musculoskeletal:  No gait disturbance, weakness or joint complaints. Integumentary: No rash or pruritis. Neurological: No headache, diplopia, change in muscle strength, numbness or tingling. No change in gait, balance, coordination, mood, affect, memory, mentation, behavior.   Psychiatric: No anxiety, no depression. Endocrine: No malaise, fatigue or temperature intolerance. No excessive thirst, fluid intake, or urination. No tremor. Hematologic/Lymphatic: No abnormal bruising or bleeding, blood clots or swollen lymph nodes. Allergic/Immunologic: No nasal congestion or hives. Physical Examination:    Vitals:    10/18/22 1115   BP: 122/68   Pulse: 69   SpO2: 98%   Weight: 181 lb (82.1 kg)   Height: 5' 10\" (1.778 m)       Body mass index is 25.97 kg/m². Wt Readings from Last 3 Encounters:   10/18/22 181 lb (82.1 kg)   07/19/22 186 lb (84.4 kg)   07/19/22 186 lb (84.4 kg)     BP Readings from Last 3 Encounters:   10/18/22 122/68   07/19/22 124/80   07/19/22 124/80       Constitutional and General Appearance:   WD/WN in NAD  HEENT:  NC/AT  TAMIE  No problems with hearing  Skin:  Warm, dry  Respiratory:  Normal excursion and expansion without use of accessory muscles  Resp Auscultation: Normal breath sounds without dullness  Cardiovascular: The apical impulses not displaced  Heart tones are crisp and normal  Cervical veins are not engorged  The carotid upstroke is normal in amplitude and contour without delay or bruit  JVP less than 8 cm H2O  RRR with nl S1 and S2 without m,r,g  Peripheral pulses are symmetrical and full  There is no clubbing, cyanosis of the extremities. No edema  Femoral Arteries: 2+ and equal  Pedal Pulses: 2+ and equal   Neck:  No thyromegaly  Abdomen:  No masses or tenderness  Liver/Spleen: No Abnormalities Noted  Neurological/Psychiatric:  Alert and oriented in all spheres  Moves all extremities well  Exhibits normal gait balance and coordination  No abnormalities of mood, affect, memory, mentation, or behavior are noted    Echo: 10/13/20  The left ventricular systolic function is severely reduced with an ejection   fraction of 20 - 25 %. There is hypokinesis of the apex, apical lateral, apical septum, anterior,   inferior, anteroseptum and apical anterior walls.    Left Mediastinum: The heart is enlarged with a small pericardial effusion. Coronary artery calcifications are a marker of atherosclerosis. Scattered mediastinal lymph nodes are not pathologic by CT criteria. The lizbeth not be evaluated in the absence of intravenous contrast.  Lungs/pleura: The tracheobronchial tree is patent. There is no pneumothorax. There small to moderate bilateral pleural effusions with atelectasis, right greater than left. There is biapical scarring. There is mild bilateral interstitial thickening within the bases likely due to interstitial edema. Upper Abdomen: Images of the upper abdomen are unremarkable. Soft Tissues/Bones: Degenerative changes involve the thoracic spine. Limited echo: 01/19/21  Summary   Limited exam for LVEF. The left ventricular systolic function is moderate to severely reduced with   an ejection fraction of 25%. Global hypokinesis. Muga: 03/24/21  MUGA Conclusions  Abnormal resting left ventricular function with EF= 42.9% . Unable to  determine wall motion on current images. Life Vest discontinued. ECHO 11/2021:    Summary   The left ventricular systolic function is moderate to severely reduced with   an ejection fraction of 30-35 %. Grade I diastolic dysfunction with normal filing pressure. Trace mitral, pulmonic and tricuspid regurgitation. MUGA 12/2021:       MUGA Conclusions    Abnormal resting left ventricular function with moderate global hypokinesis    (but more pronounced in septum) and EF= 35% . Cardiac PET 4/1/2022 @ [facility]:  IMPRESSION:   1. No FDG uptake within the myocardium to indicate active myocardial inflammation/granulomatous disease. 2.  No FDG uptake elsewhere in the imaged body to indicate active inflammation/granulomatous disease. 3.  Left ventricular dysfunction with resting perfusion defects in the septum, apex, and inferior wall.        Labs were reviewed including labs from other hospital systems through [facility]. Cardiac testing was reviewed including echos, nuclear scans, cardiac catheterization, including from other hospital systems through [facility]. Most recent Lipids 5/16/2022(care [facility]): Chloes-[id number] LDL-153 [id number]    Assessment:    1. Systolic CHF, chronic ([facility] [address]    2. Cardiac sarcoidosis ([facility] [address]    3. Non-ischemic cardiomyopathy ([facility] [address]    4. Essential hypertension    5. SOB (shortness of breath)    6. Hypertension, unspecified type    7. Hyperlipidemia, unspecified hyperlipidemia type         MUGA SCANS WILL BETTER ESTIMATE EF DUE TO LBBB    Plan:  1. Referral to Marino Joseph MD at [facility] for cardiac sarcoidosis   Contact his office for scheduling [phone]  2. Start atorvastatin (lipitor) 20 mg daily for cholesterol management   - Start this Monday 10/24/22  - Start by taking 10 mg daily for 1 month. If you tolerate this increase to a full 20 mg tablet  3. Stop gemfibrozil (lopid) now  4. Okay to decrease prednisone 5mg to 3 day a week. Monday, Wednesday, Friday  5. Labs fasting in January: CBC, CMP; BNP, Lipids, CRp  6. Follow up in January       This note was scribed in the presence of Yady Blair MD by Nestor Pedersen RN    The scribe's documentation has been prepared under my direction and personally reviewed by me in its entirety. I confirm that the note above accurately reflects all work, treatment, procedures, and medical decision making performed by me. Time Based Itemization  A total of 40 minutes was spent on today's patient encounter. If applicable, non-patient-facing activities:  ( x)Preparing to see the patient and reviewing records  ( ) Individual interpretation of results  ( ) Discussion or coordination of care with other health care professionals  ( x) Ordering of unique tests, medications, or procedures  ( x) Documentation within the EHR     I appreciate the opportunity of cooperating in the care of this patient.     Beck Lockhart M.D., [facility] [place]

## 2022-10-18 ENCOUNTER — TELEPHONE (OUTPATIENT)
Dept: CARDIOLOGY CLINIC | Age: 57
End: 2022-10-18

## 2022-10-18 ENCOUNTER — OFFICE VISIT (OUTPATIENT)
Dept: CARDIOLOGY CLINIC | Age: 57
End: 2022-10-18
Payer: COMMERCIAL

## 2022-10-18 VITALS
HEART RATE: 69 BPM | DIASTOLIC BLOOD PRESSURE: 68 MMHG | OXYGEN SATURATION: 98 % | WEIGHT: 181 LBS | HEIGHT: 70 IN | SYSTOLIC BLOOD PRESSURE: 122 MMHG | BODY MASS INDEX: 25.91 KG/M2

## 2022-10-18 DIAGNOSIS — I10 ESSENTIAL HYPERTENSION: ICD-10-CM

## 2022-10-18 DIAGNOSIS — I42.8 NON-ISCHEMIC CARDIOMYOPATHY (HCC): ICD-10-CM

## 2022-10-18 DIAGNOSIS — R06.02 SOB (SHORTNESS OF BREATH): ICD-10-CM

## 2022-10-18 DIAGNOSIS — D86.85 CARDIAC SARCOIDOSIS (HCC): ICD-10-CM

## 2022-10-18 DIAGNOSIS — E78.5 HYPERLIPIDEMIA, UNSPECIFIED HYPERLIPIDEMIA TYPE: ICD-10-CM

## 2022-10-18 DIAGNOSIS — I50.22 SYSTOLIC CHF, CHRONIC (HCC): Primary | ICD-10-CM

## 2022-10-18 DIAGNOSIS — I10 HYPERTENSION, UNSPECIFIED TYPE: ICD-10-CM

## 2022-10-18 PROCEDURE — 3078F DIAST BP <80 MM HG: CPT | Performed by: INTERNAL MEDICINE

## 2022-10-18 PROCEDURE — 3074F SYST BP LT 130 MM HG: CPT | Performed by: INTERNAL MEDICINE

## 2022-10-18 PROCEDURE — 99215 OFFICE O/P EST HI 40 MIN: CPT | Performed by: INTERNAL MEDICINE

## 2022-10-18 RX ORDER — ATORVASTATIN CALCIUM 20 MG/1
20 TABLET, FILM COATED ORAL DAILY
Qty: 90 TABLET | Refills: 3 | Status: SHIPPED | OUTPATIENT
Start: 2022-10-18

## 2022-10-18 NOTE — TELEPHONE ENCOUNTER
Josee Mcgee from [facility] stated that there is a drug interaction with the Atorvastatin 20mg and Gemfibrozil 600mg. Josee Mcgee needs verification if pt is to stop Gemfibrozil. [phone].

## 2022-10-18 NOTE — TELEPHONE ENCOUNTER
Referral to Jazmin Jacobs MD at [facility] for cardiac sarcoidosis faxed

## 2022-10-18 NOTE — PATIENT INSTRUCTIONS
Plan:  1. Referral to Iker Stewart MD at [facility] for cardiac sarcoidosis   Contact his office for scheduling [phone]  2. Start atorvastatin (lipitor) 20 mg daily for cholesterol management   - Start this Monday 10/24/22  - Start by taking 10 mg daily for 1 month. If you tolerate this increase to a full 20 mg tablet  3. Stop gemfibrozil (lopid) now  4. Okay to decrease prednisone 5mg to 3 day a week. Monday, Wednesday, Friday  5. Labs fasting in January: CBC, CMP; BNP, Lipids, CRp  6.  Follow up in January

## 2022-12-10 DIAGNOSIS — I47.29 NSVT (NONSUSTAINED VENTRICULAR TACHYCARDIA): ICD-10-CM

## 2022-12-12 RX ORDER — DIGOXIN 125 MCG
TABLET ORAL
Qty: 90 TABLET | Refills: 3 | Status: SHIPPED | OUTPATIENT
Start: 2022-12-12

## 2022-12-22 DIAGNOSIS — R79.89 ELEVATED LFTS: Primary | ICD-10-CM

## 2022-12-22 DIAGNOSIS — Z79.899 MEDICATION MANAGEMENT: ICD-10-CM

## 2022-12-22 RX ORDER — FOLIC ACID 1 MG/1
1 TABLET ORAL DAILY
Qty: 90 TABLET | Refills: 3 | Status: SHIPPED | OUTPATIENT
Start: 2022-12-22

## 2022-12-22 NOTE — TELEPHONE ENCOUNTER
Pts pharmacy called to request refill for Folic Acid 1mg 1 tablet a day. Last ov 10/18/202 kirsten. Upcoming ov 01/10/2023 kirsten.

## 2023-01-03 ENCOUNTER — PATIENT MESSAGE (OUTPATIENT)
Dept: CARDIOLOGY CLINIC | Age: 58
End: 2023-01-03

## 2023-01-03 NOTE — TELEPHONE ENCOUNTER
From: Uriel Sandra  To: Dr. Eddie Cohn: 1/3/2023 10:35 AM EST  Subject: Lab Work? Is there lab work ordered to be done before my 01/10/23 appointment? Thank you.

## 2023-01-06 NOTE — PROGRESS NOTES
[facility]  Advanced CHF/Pulmonary Hypertension   Cardiac Evaluation      Blanche Sandra  YOB: 1965    Date of Visit:  1/10/23     Chief Complaint   Patient presents with    Follow-up    Congestive Heart Failure         History of Present Illness: Dorcas Rubalcava is a 59 y.o. male who presented from referral from Dr Keith Chaney for consultation and management of advanced heart failure. PMH: nonischemic cardiomyopathy, hypertension, cardiac sarcoidosis. He presented to hospital on 10/12/20 with complaints of severe SOB, BLE edema and chest pain. Echo from 10/13/20 demonstrated EF 20-25%, moderate biatrial enlargement, mild TR and MD. His LHC/RHC 10/14/20 demonstrated severe nonischemic cardiomyopathy, no significant CAD. Cardiac MRI from 12/04/20 showed NICM concerning for inflammatory or infiltrative disease with biventricular involvement. PET scan from 12/11/20 showed abnormal FDG uptake in the lateral wall of the left ventricle and the right atrium consistent with active myocardial inflammation. Severe global HK and severely impaired LV function at 17.6%. He takes little naps during the day. He has been sleeping in his chair. He reports he was waking up feeling smothery. He has not slept in a bed since 09/2020. OV 12/22/20, his lisinopril was stopped. He was started on Entresto 1/2 tab of 49/51 mg twice daily and digoxin 125 mcg daily. He reported he was sleeping when he experienced VT on 12/13/2020. He had a 10.5 second episode of monomorphic VT with a 360 msec cycle length at 1:24PM. He states he woke up and his Lifevest showed an orange screen that said \"patient respond\", he pushed the button and went back to sleep. On 01/05/21 he reported DR Elyssa Kowalski said he was 70% positive for sarcoidosis. He has been taking prednisone 40 mg daily. DR Elyssa Kowalski started methotrexate. He reported he did not have any more alarms from his Life Vest. He is tolerating the entresto and digoxin well.  He reports he feels better and has more energy. His weight is down almost 20 pounds. He was started on spironolactone 25 mg M,W,F and his lasix was adjusted to 20 mg on M,W,F and 40 mg all other days at this OV. Limited echo from 01/19/21 showed his EF was 25%. OV 02/16/21 he reported Dr. Cornelious Gowers decreased his prednisone to 30 mg then in 1 month go to 20 mg daily. His SBP has been controlled at home in the 110-120s. He is taking entresto 1/2 tab 49/51 mg twice daily. He takes spironolactone 3 days a week. At this OV his entresto was increased to 1/2 tablet 49/51 mg in am and full tablet 49/51 mg in pm.   MUGA Scan 03/24/21 his EF was 42.9%. Life Vest off 03/25/21. His cardiac monitor from 04/2021 showed moderate PVC burden of 11.49%. Echo 11/2021 demonstrated an LVEF of 30-35%. Muga 12/2021 demonstrated an LVEF of 35%. OV 3/1/2022 patient reported recovering from shingles. He reported he is not vaccinated for COVID. He reported he did not wear the cardiac event monitor as recommended last visit because he was unable to obtain insurance information. Patient had a Cardiac PET Scan 4/1/2022 that didn't show any active myocardial inflammation, left ventricular dysfunction with resting perfusion defects in the septum, apex, and inferior wall. OV, 7/19/2022, Patient states Dr. Cornelious Gowers retired. He states he has been taking 5mg of Prednisone for about 3 months, has been tapering down. He states he has been on mycophenolate since March due to elevated liver enzymes while on methotrexate. He had a repeat PET scan showing no inflammation compared to the last one. OV, 10/18/2022, he was started on lipitor. Referred to Dr. Efrain Lira at [facility] for cardiac sarcoidosis. Today 1/10/2023 he says he is doing well. He has an appointment with Dr. Efrain Lira at [facility] on 2/7/2023  Patient is taking all cardiac medications as prescribed and tolerates them well.   Patient denies current edema, chest pain, sob, palpitations, dizziness or syncope. No Known Allergies  Current Outpatient Medications   Medication Sig Dispense Refill    folic acid (FOLVITE) 1 MG tablet Take 1 tablet by mouth daily 90 tablet 3    digoxin (LANOXIN) 125 MCG tablet Take 1 tablet by mouth once daily 90 tablet 3    atorvastatin (LIPITOR) 20 MG tablet Take 1 tablet by mouth daily 90 tablet 3    mycophenolate (CELLCEPT) 500 MG tablet TAKE 1 TABLET BY MOUTH TWICE DAILY 180 tablet 3    pantoprazole (PROTONIX) 40 MG tablet TAKE 1 TABLET BY MOUTH IN THE MORNING BEFORE BREAKFAST 90 tablet 3    furosemide (LASIX) 40 MG tablet 40mg daily on 2 days a week, then 20mg daily all other days 108 tablet 2    metoprolol succinate (TOPROL XL) 50 MG extended release tablet Take 1/2 (one-half) tablet by mouth twice daily 90 tablet 3    sacubitril-valsartan (ENTRESTO)  MG per tablet 1/2 tablet by mouth twice a day 60 tablet 11    aspirin 81 MG chewable tablet CHEW ONE TABLET BY MOUTH DAILY 90 tablet 3    spironolactone (ALDACTONE) 25 MG tablet 25 mg on M,W,F 30 tablet 5    predniSONE (DELTASONE) 20 MG tablet Take 5 mg by mouth daily Only taking three days a week      Multiple Vitamins-Minerals (THERAPEUTIC MULTIVITAMIN-MINERALS) tablet Take 1 tablet by mouth daily      Blood Glucose Monitoring Suppl ([employer] BLOOD GLUCOSE KIT) KIT 1 each by Does not apply route daily (Patient not taking: Reported on 1/10/2023) 1 kit 0     No current facility-administered medications for this visit. History reviewed. No pertinent past medical history. History reviewed. No pertinent surgical history.   Family History   Problem Relation Age of Onset    Cancer Mother         skin     Cancer Father         skin, prostate     Cancer Sister         skin, Melanoma     Social History     Socioeconomic History    Marital status:      Spouse name: Not on file    Number of children: Not on file    Years of education: Not on file    Highest education level: Not on file   Occupational History    Not on file   Tobacco Use    Smoking status: Never    Smokeless tobacco: Never   Vaping Use    Vaping Use: Never used   Substance and Sexual Activity    Alcohol use: Not Currently    Drug use: No    Sexual activity: Not on file   Other Topics Concern    Not on file   Social History Narrative    Not on file     Social Determinants of Health     Financial Resource Strain: Not on file   Food Insecurity: Not on file   Transportation Needs: Not on file   Physical Activity: Not on file   Stress: Not on file   Social Connections: Not on file   Intimate Partner Violence: Not on file   Housing Stability: Not on file       Review of Systems:   Constitutional: there has been no unanticipated weight loss. There's been no change in energy level, sleep pattern, or activity level. Eyes: No visual changes or diplopia. No scleral icterus. ENT: No Headaches, hearing loss or vertigo. No mouth sores or sore throat. Cardiovascular: Reviewed in HPI  Respiratory: No cough or wheezing, no sputum production. No hematemesis. Gastrointestinal: No abdominal pain, appetite loss, blood in stools. No change in bowel or bladder habits. Genitourinary: No dysuria, trouble voiding, or hematuria. Musculoskeletal:  No gait disturbance, weakness or joint complaints. Integumentary: No rash or pruritis. Neurological: No headache, diplopia, change in muscle strength, numbness or tingling. No change in gait, balance, coordination, mood, affect, memory, mentation, behavior. Psychiatric: No anxiety, no depression. Endocrine: No malaise, fatigue or temperature intolerance. No excessive thirst, fluid intake, or urination. No tremor. Hematologic/Lymphatic: No abnormal bruising or bleeding, blood clots or swollen lymph nodes. Allergic/Immunologic: No nasal congestion or hives.     Physical Examination:    Vitals:    01/10/23 1119   BP: 110/72   Pulse: 62   SpO2: 100%   Weight: 180 lb 9.6 oz (81.9 kg)   Height: 5' 10\" (1.778 m) Body mass index is 25.91 kg/m². Wt Readings from Last 3 Encounters:   01/10/23 180 lb 9.6 oz (81.9 kg)   01/10/23 180 lb 9.6 oz (81.9 kg)   10/18/22 181 lb (82.1 kg)     BP Readings from Last 3 Encounters:   01/10/23 110/72   01/10/23 110/72   10/18/22 122/68       Constitutional and General Appearance:   WD/WN in NAD  HEENT:  NC/AT  TAMIE  No problems with hearing  Skin:  Warm, dry  Respiratory:  Normal excursion and expansion without use of accessory muscles  Resp Auscultation: Normal breath sounds without dullness  Cardiovascular: The apical impulses not displaced  Heart tones are crisp and normal  Cervical veins are not engorged  The carotid upstroke is normal in amplitude and contour without delay or bruit  JVP less than 8 cm H2O  RRR with nl S1 and S2 without m,r,g  Peripheral pulses are symmetrical and full  There is no clubbing, cyanosis of the extremities. No edema  Femoral Arteries: 2+ and equal  Pedal Pulses: 2+ and equal   Neck:  No thyromegaly  Abdomen:  No masses or tenderness  Liver/Spleen: No Abnormalities Noted  Neurological/Psychiatric:  Alert and oriented in all spheres  Moves all extremities well  Exhibits normal gait balance and coordination  No abnormalities of mood, affect, memory, mentation, or behavior are noted    Echo: 10/13/20  The left ventricular systolic function is severely reduced with an ejection   fraction of 20 - 25 %. There is hypokinesis of the apex, apical lateral, apical septum, anterior,   inferior, anteroseptum and apical anterior walls. Left ventricular cavity size is mildly dilated. Left ventricular diastolic filling pressure is elevated. Moderate mitral regurgitation. Mild Bi-atrial enlargement. The right ventricle is mildly enlarged. Right ventricular systolic function is mildly reduced . Mild tricuspid and pulmonic regurgitation. There is a left pleural effusion.     Cardiac cath: 10/14/20  Findings:      LEFT HEART CATH  LM: ostial pinch (MLA by IVUS was >30)  LAD: luminals, luminals calcification  Ramus: luminals  LCX: Luminals  RCA: dominant, sluggish flow. KEVIN-2 flow. LVEDP: 15  LVEF: <20%, severe global hypokinesis. No MR, No AS        RIGHT HEART CATH  RA: 6   RV: 33/6   PA: 33/15   and mean of  22  PCWP: 15     Sats: on RA  Ao: 89%  RA:  59%  PA: 61%     CO/CI: 4.5/2.2 (stephanie)  SVR/PVR: 1272/122        Assessment  1. Severe nonischemic cardiomyopathy. LVEF less than 20%                -We will start secondary work-up                - will consider LifeVest  2. No significant CAD. Luminal calcification  3.  Relatively normal mildly elevated intracardiac filling pressures. MRI card: 12/4/20  IMPRESSION: Findings are most consistent with non-ischemic cardiomyopathy concerning for inflammatory or infiltrative disease with biventricular involvement. Differential includes subacute myocarditis or an infiltrative process such as cardiac sarcoidosis. Recommend FDG PET for further evaluation for sarcoidosis. There is four chamber dilation with severely reduced biventricular systolic function. There is evidence of decompensated heart failure. PET: 12/11/20  IMPRESSION: 1. Abnormal FDG uptake in the lateral wall of the left ventricle, and the right atrium consistent with active myocardial inflammation. 2. Severely impaired left ventricular function with severe global hypokinesis. 3. Normal resting left ventricular perfusion. 4. Moderate bilateral pleural effusion. CT chest: 12/15/20  FINDINGS: Mediastinum: The heart is enlarged with a small pericardial effusion. Coronary artery calcifications are a marker of atherosclerosis. Scattered mediastinal lymph nodes are not pathologic by CT criteria. The lizbeth not be evaluated in the absence of intravenous contrast.  Lungs/pleura: The tracheobronchial tree is patent. There is no pneumothorax.  There small to moderate bilateral pleural effusions with atelectasis, right greater than left. There is biapical scarring. There is mild bilateral interstitial thickening within the bases likely due to interstitial edema. Upper Abdomen: Images of the upper abdomen are unremarkable. Soft Tissues/Bones: Degenerative changes involve the thoracic spine. Limited echo: 01/19/21  Summary   Limited exam for LVEF. The left ventricular systolic function is moderate to severely reduced with   an ejection fraction of 25%. Global hypokinesis. Muga: 03/24/21  MUGA Conclusions  Abnormal resting left ventricular function with EF= 42.9% . Unable to  determine wall motion on current images. Life Vest discontinued. ECHO 11/2021:    Summary   The left ventricular systolic function is moderate to severely reduced with   an ejection fraction of 30-35 %. Grade I diastolic dysfunction with normal filing pressure. Trace mitral, pulmonic and tricuspid regurgitation. MUGA 12/2021:       MUGA Conclusions    Abnormal resting left ventricular function with moderate global hypokinesis    (but more pronounced in septum) and EF= 35% . Cardiac PET 4/1/2022 @ [facility]:  IMPRESSION:   1. No FDG uptake within the myocardium to indicate active myocardial inflammation/granulomatous disease. 2.  No FDG uptake elsewhere in the imaged body to indicate active inflammation/granulomatous disease. 3.  Left ventricular dysfunction with resting perfusion defects in the septum, apex, and inferior wall. Left ventricular ejection fraction:  25 %     Labs were reviewed including labs from other hospital systems through [facility]. Cardiac testing was reviewed including echos, nuclear scans, cardiac catheterization, including from other hospital systems through [facility]. Most recent Lipids 5/16/2022(care everywhere): Chloes-222 LDL-153 [id number]    Assessment:    1. Systolic CHF, chronic ([address]    2. Non-ischemic cardiomyopathy ([address]    3. Cardiac sarcoidosis ([address]    4. Essential hypertension    5. SOB (shortness of breath)           MUGA SCANS WILL BETTER ESTIMATE EF DUE TO LBBB    Plan:  Start empagliflozin (Jardiance) 10 mg daily to help improve heart function    Cardiac MRI for cardiac sarcoidosis. This is to see how much scarring he has in his heart to predict if ICD is needed  You can call [phone] to schedule this. It must be done at [address] to get done at [place] [id number] 1 month after starting jardiance: A1C, BNP, BMP,   Decrease lasix to 20 mg daily. Okay to take an extra tablet as needed  Follow up with me in 3-4 months       This note was scribed in the presence of Susy Ta MD by Tessa Bartholomew RN    The scribe's documentation has been prepared under my direction and personally reviewed by me in its entirety. I confirm that the note above accurately reflects all work, treatment, procedures, and medical decision making performed by me. Time Based Itemization  A total of 40 minutes was spent on today's patient encounter. If applicable, non-patient-facing activities:  ( x)Preparing to see the patient and reviewing records  ( ) Individual interpretation of results  ( ) Discussion or coordination of care with other health care professionals  ( x) Ordering of unique tests, medications, or procedures  ( x) Documentation within the EHR     I appreciate the opportunity of cooperating in the care of this patient.     Huyen Navarro M.D., [facility]

## 2023-01-09 ENCOUNTER — HOSPITAL ENCOUNTER (OUTPATIENT)
Age: 58
Discharge: HOME | End: 2023-01-09
Payer: COMMERCIAL

## 2023-01-09 DIAGNOSIS — E78.5 HYPERLIPIDEMIA, UNSPECIFIED HYPERLIPIDEMIA TYPE: ICD-10-CM

## 2023-01-09 DIAGNOSIS — D86.85 CARDIAC SARCOIDOSIS (HCC): ICD-10-CM

## 2023-01-09 DIAGNOSIS — I50.22 SYSTOLIC CHF, CHRONIC (HCC): ICD-10-CM

## 2023-01-09 LAB
A/G RATIO: 2.2 (ref 1.1–2.2)
ALBUMIN SERPL-MCNC: 4.2 G/DL (ref 3.4–5)
ALP BLD-CCNC: 64 U/L (ref 40–129)
ALT SERPL-CCNC: 20 U/L (ref 10–40)
ANION GAP SERPL CALCULATED.3IONS-SCNC: 10 MMOL/L (ref 3–16)
AST SERPL-CCNC: 16 U/L (ref 15–37)
BILIRUB SERPL-MCNC: 0.4 MG/DL (ref 0–1)
BUN BLDV-MCNC: 16 MG/DL (ref 7–20)
C-REACTIVE PROTEIN: <3 MG/L (ref 0–5.1)
CALCIUM SERPL-MCNC: 9.2 MG/DL (ref 8.3–10.6)
CHLORIDE BLD-SCNC: 104 MMOL/L (ref 99–110)
CHOLESTEROL, TOTAL: 153 MG/DL (ref 0–199)
CO2: 26 MMOL/L (ref 21–32)
CREAT SERPL-MCNC: 1 MG/DL (ref 0.9–1.3)
GFR SERPL CREATININE-BSD FRML MDRD: >60 ML/MIN/{1.73_M2}
GLUCOSE BLD-MCNC: 125 MG/DL (ref 70–99)
HCT VFR BLD CALC: 41.4 % (ref 40.5–52.5)
HDLC SERPL-MCNC: 34 MG/DL (ref 40–60)
HEMOGLOBIN: 13.5 G/DL (ref 13.5–17.5)
LDL CHOLESTEROL CALCULATED: 80 MG/DL
MCH RBC QN AUTO: 27.4 PG (ref 26–34)
MCHC RBC AUTO-ENTMCNC: 32.6 G/DL (ref 31–36)
MCV RBC AUTO: 83.9 FL (ref 80–100)
PDW BLD-RTO: 12.9 % (ref 12.4–15.4)
PLATELET # BLD: 187 K/UL (ref 135–450)
PMV BLD AUTO: 8.6 FL (ref 5–10.5)
POTASSIUM SERPL-SCNC: 4.7 MMOL/L (ref 3.5–5.1)
PRO-BNP: 85 PG/ML (ref 0–124)
RBC # BLD: 4.93 M/UL (ref 4.2–5.9)
SODIUM BLD-SCNC: 140 MMOL/L (ref 136–145)
TOTAL PROTEIN: 6.1 G/DL (ref 6.4–8.2)
TRIGL SERPL-MCNC: 196 MG/DL (ref 0–150)
VLDLC SERPL CALC-MCNC: 39 MG/DL
WBC # BLD: 4.9 K/UL (ref 4–11)

## 2023-01-09 PROCEDURE — 86140 C-REACTIVE PROTEIN: CPT

## 2023-01-09 PROCEDURE — 80061 LIPID PANEL: CPT

## 2023-01-09 PROCEDURE — 83880 ASSAY OF NATRIURETIC PEPTIDE: CPT

## 2023-01-09 PROCEDURE — 80053 COMPREHEN METABOLIC PANEL: CPT

## 2023-01-09 PROCEDURE — 85027 COMPLETE CBC AUTOMATED: CPT

## 2023-01-09 PROCEDURE — 36415 COLL VENOUS BLD VENIPUNCTURE: CPT

## 2023-01-10 ENCOUNTER — OFFICE VISIT (OUTPATIENT)
Dept: CARDIOLOGY CLINIC | Age: 58
End: 2023-01-10
Payer: COMMERCIAL

## 2023-01-10 ENCOUNTER — OFFICE VISIT (OUTPATIENT)
Dept: CARDIOLOGY CLINIC | Age: 58
End: 2023-01-10

## 2023-01-10 VITALS
HEIGHT: 70 IN | HEART RATE: 62 BPM | WEIGHT: 180.6 LBS | SYSTOLIC BLOOD PRESSURE: 110 MMHG | BODY MASS INDEX: 25.86 KG/M2 | DIASTOLIC BLOOD PRESSURE: 72 MMHG | OXYGEN SATURATION: 100 %

## 2023-01-10 VITALS
HEART RATE: 62 BPM | SYSTOLIC BLOOD PRESSURE: 110 MMHG | DIASTOLIC BLOOD PRESSURE: 72 MMHG | HEIGHT: 70 IN | WEIGHT: 180.6 LBS | OXYGEN SATURATION: 100 % | BODY MASS INDEX: 25.86 KG/M2

## 2023-01-10 DIAGNOSIS — D86.85 CARDIAC SARCOIDOSIS (HCC): ICD-10-CM

## 2023-01-10 DIAGNOSIS — I42.8 NON-ISCHEMIC CARDIOMYOPATHY (HCC): ICD-10-CM

## 2023-01-10 DIAGNOSIS — I44.7 LEFT BUNDLE BRANCH BLOCK: Primary | ICD-10-CM

## 2023-01-10 DIAGNOSIS — I47.29 NSVT (NONSUSTAINED VENTRICULAR TACHYCARDIA): ICD-10-CM

## 2023-01-10 DIAGNOSIS — R06.02 SOB (SHORTNESS OF BREATH): ICD-10-CM

## 2023-01-10 DIAGNOSIS — I10 ESSENTIAL HYPERTENSION: ICD-10-CM

## 2023-01-10 DIAGNOSIS — I50.22 SYSTOLIC CHF, CHRONIC (HCC): Primary | ICD-10-CM

## 2023-01-10 PROCEDURE — 93000 ELECTROCARDIOGRAM COMPLETE: CPT | Performed by: INTERNAL MEDICINE

## 2023-01-10 PROCEDURE — 3078F DIAST BP <80 MM HG: CPT | Performed by: INTERNAL MEDICINE

## 2023-01-10 PROCEDURE — 3074F SYST BP LT 130 MM HG: CPT | Performed by: INTERNAL MEDICINE

## 2023-01-10 PROCEDURE — 99214 OFFICE O/P EST MOD 30 MIN: CPT | Performed by: INTERNAL MEDICINE

## 2023-01-10 RX ORDER — FUROSEMIDE 20 MG/1
TABLET ORAL
Qty: 135 TABLET | Refills: 3 | Status: SHIPPED | OUTPATIENT
Start: 2023-01-10

## 2023-01-10 NOTE — PROGRESS NOTES
[facility]   Electrophysiology Consult Note              Date: 1/10/23  Patient Name: Robyn Rojas  YOB: 1965    Primary Care Physician: [facility], MD    CHIEF COMPLAINT:   Chief Complaint   Patient presents with    Follow-up    Tachycardia     NSVT      HISTORY OF PRESENT ILLNESS: Robyn Rojas is a 59 y.o. male with a PMH significant for CHF, NICM, heart enlargement (genetic testing in progress). He follows with the heart failure team for his CHF and NICM and was initiated with a Lifevest on 10/16/2020. His EF was 42% on 3/24/2021 and his Lifevest was taken off on 3/25/2021. He wore a cardiac event monitor for palpitations in 3/2021 which demonstrated a PVC burden of 11.5%. On 7/29/2021, ECG demonstrates SB (55). He states that he can not feel the PVC's that he has. He states that he has not had any episodes of syncope. He states that he has much improved with his energy level and symptoms over the last couple of months. A MUGA scan in 12/2021 showed EF 35%. Today, 1/10/2023, ECG demonstrates SR 62 BPM. He reports that he is doing ok. He denies any heart failure symptoms. 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 He is taking his medications as prescribed. Patient denies current edema, chest pain, sob, palpitations, dizziness or syncope. Past Medical History:   has no past medical history on file. Past Surgical History:   has no past surgical history on file. Allergies:  Patient has no known allergies. Social History:   reports that he has never smoked. He has never used smokeless tobacco. He reports that he does not currently use alcohol. He reports that he does not use drugs. Family History: family history includes Cancer in his father, mother, and sister. Home Medications:    Prior to Admission medications    Medication Sig Start Date End Date Taking?  Authorizing Provider   folic acid (FOLVITE) 1 MG tablet Take 1 tablet by mouth daily 12/22/22  Yes Millicent Bernard MD   digoxin [facility]) 125 MCG tablet Take 1 tablet by mouth once daily 12/12/22  Yes LELAND Foreman CNP   atorvastatin (LIPITOR) 20 MG tablet Take 1 tablet by mouth daily 10/18/22  Yes Millicent Bernard MD   mycophenolate (CELLCEPT) 500 MG tablet TAKE 1 TABLET BY MOUTH TWICE DAILY 9/12/22  Yes Millicent Bernard MD   pantoprazole (PROTONIX) 40 MG tablet TAKE 1 TABLET BY MOUTH IN THE MORNING BEFORE BREAKFAST 8/3/22  Yes Millicent Bernard MD   furosemide (LASIX) 40 MG tablet 40mg daily on 2 days a week, then 20mg daily all other days 7/18/22  Yes Millicent Bernard MD   metoprolol succinate (TOPROL XL) 50 MG extended release tablet Take 1/2 (one-half) tablet by mouth twice daily 6/29/22  Yes Millicent Bernard MD   sacubitril-valsartan [facility])  MG per tablet 1/2 tablet by mouth twice a day 6/9/22  Yes LELAND Foreman CNP   aspirin 81 MG chewable tablet CHEW ONE TABLET BY MOUTH DAILY 2/28/22  Yes Millicent Bernard MD   spironolactone (ALDACTONE) 25 MG tablet 25 mg on M,W,F 2/14/22  Yes Millicent Bernard MD   predniSONE (DELTASONE) 20 MG tablet Take 5 mg by mouth daily Only taking three days a week   Yes Historical Provider, MD   Multiple Vitamins-Minerals (THERAPEUTIC MULTIVITAMIN-MINERALS) tablet Take 1 tablet by mouth daily   Yes Historical Provider, MD   Blood Glucose Monitoring Suppl ([employer] BLOOD GLUCOSE KIT) KIT 1 each by Does not apply route daily  Patient not taking: Reported on 1/10/2023 1/6/21   Valente Ware MD       REVIEW OF SYSTEMS:    All 14-point review of systems are completed and  pertinent positives are mentioned in the history of present illness. Other  systems are reviewed and are negative.     Physical Examination:    /72   Pulse 62   Ht 5' 10\" (1.778 m)   Wt 180 lb 9.6 oz (81.9 kg)   SpO2 100%   BMI 25.91 kg/m²      Constitutional and General Appearance:    alert, cooperative, no distress and appears stated age  [de-identified]:    PERRLA, no cervical lymphadenopathy. No masses palpable. Normal oral  mucosa  Respiratory:  Normal excursion and expansion without use of accessory muscles  Resp Auscultation: Normal breath sounds without dullness or wheezing  Cardiovascular: The apical impulse is not displaced  RRR S1S2 w/o M/G/R. Occasional PVCs. Abdomen:  No masses or tenderness  Bowel sounds present  Extremities:   No Cyanosis or Clubbing   Lower extremity edema: No  Skin: Warm and dry  Neurological:  Alert and oriented. Moves all extremities well  No abnormalities of mood, affect, memory, mentation, or behavior are noted    DATA:    ECG 1/10/23: Normal sinus rhythm with LBBB. No PVCs noted. Muga Scan 3/24/2021  MUGA Conclusions  Abnormal resting left ventricular function with EF= 42.9% . Unable to  determine wall motion on current images. Echo 1/19/2021   Summary   Limited exam for LVEF. The left ventricular systolic function is moderate to severely reduced with   an ejection fraction of 25%. Global hypokinesis. IMPRESSION:    1. Sarcoid cardiomyopathy. 7/29/2021  Patient is a pleasant 49-year-old male with a medical history significant for cardiac sarcoidosis, nonsustained ventricular tachycardia with now mildly depressed left ventricular ejection fraction, premature ventricular contractions, and hypertension who presents from home to [facility]. He has no recent history of ventricular tachycardia. He has no recent history of syncope. His LV scar burden is unclear (Dr. Heath Crew trying to assist me getting measurement based on cardiac MRI from 2020), and his LVEF is greater than 35% on MUGA. We discussed EPS for inducible VT however patient states that since he is doing well he would like to defer. He has no permanent pacing indications. While my preference would be to test him given his age, this recommendation is only class IIa. I will defer to his judgement.   - Consider EPS and BiV ICD (discussed in clinic, NYHA II). - Continue GDMT and therapy for cardiac sarcoidosis. - Follow up with EP NP in 6 months then yearly unless/until procedure/discussion required or PRN. 1/10/2023  Patient resents for follow-up. Reports he is been doing well. His exercise tolerance is stable and he is able to mow the lawn for both him and his parents. When he does feel like he needs a break he takes rest and is able to keep going shortly thereafter. We again discussed defibrillator and biventricular pacing however patient would like to avoid this. - Patient declines ICD and BiV pacing at this point as he feels even stronger.  - Continue GDMT. - Follow up with sarcoid specialist as referred. - Follow up with Dr. Nader Merino. 2. Premature ventricular contractions. 07/29/2021  Patient recently wore a monitor on 04/21/2021 that showed moderate PVC burden that was symptomatic. He denies symptoms now. No PVCs on his EKG. We discussed options including monitoring clinically, antiarrhythmic therapy and ablation (may improve cardiac function) however as he is feeling well he would like to defer treatment which I don't think is unreasonable. - Continue to monitor clinically. 1/10/2023  Patient continues to have multifocal premature ventricular contractions. Ideally we would control his cardiac sarcoid and then when optimally managed then consider sotalol or amiodarone. Patient would prefer to avoid amiodarone. We also discussed ablation however given the multifocal nature and the chance for recurrence and other locations I do not think ablation would be the best first choice. We will ask for an imaging study whether that be cardiac MRI, MUGA, or echocardiogram.  I will discuss imaging with Dr. Margo Blizzard. Should his left ventricular ejection fraction have worsened then patient would like to try sotalol. I will arrange if this is the case. - Discussed cardiac imaging with Dr. Nader Merino.   - If LVEF has worsened then consider sotalol admission.  - Follow up with me in 1 year or PRN pending above. RECOMMENDATIONS:  Repeat echo, cardiac MRI, or cardiac PET- will discuss with Dr Ty Boyd. We will let you know which she prefers and call you to schedule. If heart function worsened, can consider treating PVC's to see if heart function improves. Discussed sotalol (requires hospital stay) versus amiodarone medication. Discussed ablation procedure. Discussed risks and benefits of CRT-D to prevent sudden cardiac death given weakened heart. Follow up in one year, or sooner if needed. QUALITY MEASURES  1. Tobacco Cessation Counseling: NA  2. Retake of BP if >140/90:   NA  3. Documentation to PCP/referring for new patient:  Sent to PCP at close of office visit  4. CAD patient on anti-platelet: NA  5. CAD patient on STATIN therapy:  Yes  6. Patient with CHF and aFib on anticoagulation:  NA     All questions and concerns were addressed to the patient/family. Alternatives to my treatment were discussed. Dr. Jose Daniel Amezquita MD  Electrophysiology  [facility]. [address] [place] [address]. [address]. [place], [address]  Phone: [phone]  Fax: [phone]     NOTE: This report was transcribed using voice recognition software. Every effort was made to ensure accuracy, however, inadvertent computerized transcription errors may be present. Benson Luis RN, am scribing for and in the presence of Dr. Wyatt Loera. 01/10/23 10:27 AM   Mike Beadr RN    I reviewed with the resident the medical history and the resident's findings on the physical examination. I discussed with the resident the patient's diagnosis and concur with the plan.

## 2023-01-10 NOTE — PATIENT INSTRUCTIONS
Repeat echo, cardiac MRI, or cardiac PET- will discuss with Dr Devonte Guadarrama. We will let you know which she prefers and call you to schedule. If heart function worsened, can consider treating PVC's to see if heart function improves. Discussed sotalol (requires hospital stay) versus amiodarone medication. Discussed ablation procedure. Discussed risks and benefits of CRT-D to prevent sudden cardiac death given weakened heart. Follow up in one year, or sooner if needed.

## 2023-01-10 NOTE — PATIENT INSTRUCTIONS
Your provider has ordered testing for further evaluation. An order/prescription has been included in your paper work. To schedule outpatient testing, contact Central Scheduling by calling [address] [place] [address] ([phone]). Plan:  Start empagliflozin (Jardiance) 10 mg daily to help improve heart function    Cardiac MRI for cardiac sarcoidosis  You can call [phone] to schedule this. It must be done at [address]  to get done at [place] [address] 1 month after starting jardiance: A1C, BNP, BMP,   Decrease lasix to 20 mg daily.  Okay to take an extra tablet as needed  Follow up with me in 3-4 months

## 2023-01-12 ENCOUNTER — TELEPHONE (OUTPATIENT)
Dept: CARDIOLOGY CLINIC | Age: 58
End: 2023-01-12

## 2023-01-12 NOTE — TELEPHONE ENCOUNTER
[facility] called into office to check on progress status for pt jardiance prior authorization.  Please advise, pharmacy can be contacted at [phone]

## 2023-02-10 ENCOUNTER — TELEPHONE (OUTPATIENT)
Dept: CARDIOLOGY CLINIC | Age: 58
End: 2023-02-10

## 2023-02-10 NOTE — TELEPHONE ENCOUNTER
----- Message from Carlo Brooks MD sent at 2/9/2023  3:14 PM EST -----  Please let patient know that cardiac function stable. Probably some sarcoid involvement. I still recommend ICD however I understand his position and will respect his wishes.

## 2023-02-10 NOTE — TELEPHONE ENCOUNTER
Called and spoke with patient. Informed him of Dr Gen Donnelly result message. Patient verbally understood.

## 2023-02-24 DIAGNOSIS — I50.22 SYSTOLIC CHF, CHRONIC (HCC): ICD-10-CM

## 2023-02-28 RX ORDER — SPIRONOLACTONE 25 MG/1
TABLET ORAL
Qty: 12 TABLET | Refills: 11 | Status: SHIPPED | OUTPATIENT
Start: 2023-02-28

## 2023-03-06 RX ORDER — ASPIRIN 81 MG/1
TABLET, CHEWABLE ORAL
Qty: 90 TABLET | Refills: 3 | Status: SHIPPED | OUTPATIENT
Start: 2023-03-06

## 2023-04-27 ENCOUNTER — HOSPITAL ENCOUNTER (OUTPATIENT)
Age: 58
Discharge: HOME | End: 2023-04-27
Payer: COMMERCIAL

## 2023-04-27 DIAGNOSIS — I42.8 NON-ISCHEMIC CARDIOMYOPATHY (HCC): ICD-10-CM

## 2023-04-27 DIAGNOSIS — D86.85 CARDIAC SARCOIDOSIS (HCC): ICD-10-CM

## 2023-04-27 LAB
ANION GAP SERPL CALCULATED.3IONS-SCNC: 8 MMOL/L (ref 3–16)
BUN SERPL-MCNC: 20 MG/DL (ref 7–20)
CALCIUM SERPL-MCNC: 9.1 MG/DL (ref 8.3–10.6)
CHLORIDE SERPL-SCNC: 105 MMOL/L (ref 99–110)
CO2 SERPL-SCNC: 28 MMOL/L (ref 21–32)
CREAT SERPL-MCNC: 1 MG/DL (ref 0.8–1.3)
GFR SERPLBLD CREATININE-BSD FMLA CKD-EPI: >60 ML/MIN/{1.73_M2}
GLUCOSE SERPL-MCNC: 92 MG/DL (ref 70–99)
NT-PROBNP SERPL-MCNC: 259 PG/ML (ref 0–124)
POTASSIUM SERPL-SCNC: 4.4 MMOL/L (ref 3.5–5.1)
SODIUM SERPL-SCNC: 141 MMOL/L (ref 136–145)

## 2023-04-27 PROCEDURE — 80048 BASIC METABOLIC PNL TOTAL CA: CPT

## 2023-04-27 PROCEDURE — 36415 COLL VENOUS BLD VENIPUNCTURE: CPT

## 2023-04-27 PROCEDURE — 83036 HEMOGLOBIN GLYCOSYLATED A1C: CPT

## 2023-04-27 PROCEDURE — 83880 ASSAY OF NATRIURETIC PEPTIDE: CPT

## 2023-04-28 LAB
EST. AVERAGE GLUCOSE BLD GHB EST-MCNC: 114 MG/DL
HBA1C MFR BLD: 5.6 %

## 2023-05-01 NOTE — PROGRESS NOTES
extremities well  Exhibits normal gait balance and coordination  No abnormalities of mood, affect, memory, mentation, or behavior are noted    Echo: 10/13/20  The left ventricular systolic function is severely reduced with an ejection   fraction of 20 - 25 %. There is hypokinesis of the apex, apical lateral, apical septum, anterior,   inferior, anteroseptum and apical anterior walls. Left ventricular cavity size is mildly dilated. Left ventricular diastolic filling pressure is elevated. Moderate mitral regurgitation. Mild Bi-atrial enlargement. The right ventricle is mildly enlarged. Right ventricular systolic function is mildly reduced . Mild tricuspid and pulmonic regurgitation. There is a left pleural effusion. Cardiac cath: 10/14/20  Findings:      LEFT HEART CATH  LM: ostial pinch (MLA by IVUS was >30)  LAD: luminals, luminals calcification  Ramus: luminals  LCX: Luminals  RCA: dominant, sluggish flow. KEVIN-2 flow. LVEDP: 15  LVEF: <20%, severe global hypokinesis. No MR, No AS        RIGHT HEART CATH  RA: 6   RV: 33/6   PA: 33/15   and mean of  22  PCWP: 15     Sats: on RA  Ao: 89%  RA:  59%  PA: 61%     CO/CI: 4.5/2.2 (stephanie)  SVR/PVR: 1272/122        Assessment  1. Severe nonischemic cardiomyopathy. LVEF less than 20%                -We will start secondary work-up                - will consider LifeVest  2. No significant CAD. Luminal calcification  3.  Relatively normal mildly elevated intracardiac filling pressures. MRI card: 12/4/20  IMPRESSION: Findings are most consistent with non-ischemic cardiomyopathy concerning for inflammatory or infiltrative disease with biventricular involvement. Differential includes subacute myocarditis or an infiltrative process such as cardiac sarcoidosis. Recommend FDG PET for further evaluation for sarcoidosis. There is four chamber dilation with severely reduced biventricular systolic function.  There is evidence of

## 2023-05-02 ENCOUNTER — OFFICE VISIT (OUTPATIENT)
Dept: CARDIOLOGY CLINIC | Age: 58
End: 2023-05-02

## 2023-05-02 VITALS
WEIGHT: 174 LBS | HEIGHT: 70 IN | DIASTOLIC BLOOD PRESSURE: 64 MMHG | BODY MASS INDEX: 24.91 KG/M2 | SYSTOLIC BLOOD PRESSURE: 114 MMHG | HEART RATE: 67 BPM | OXYGEN SATURATION: 97 %

## 2023-05-02 DIAGNOSIS — I50.22 SYSTOLIC CHF, CHRONIC (HCC): Primary | ICD-10-CM

## 2023-05-02 DIAGNOSIS — R06.02 SOB (SHORTNESS OF BREATH): ICD-10-CM

## 2023-05-02 DIAGNOSIS — D86.85 CARDIAC SARCOIDOSIS (HCC): ICD-10-CM

## 2023-05-02 DIAGNOSIS — I10 ESSENTIAL HYPERTENSION: ICD-10-CM

## 2023-05-02 DIAGNOSIS — I42.8 NON-ISCHEMIC CARDIOMYOPATHY (HCC): ICD-10-CM

## 2023-05-02 DIAGNOSIS — I44.7 LEFT BUNDLE BRANCH BLOCK: ICD-10-CM

## 2023-05-02 RX ORDER — HYDROXYCHLOROQUINE SULFATE 200 MG/1
200 TABLET, FILM COATED ORAL 2 TIMES DAILY
Qty: 60 TABLET | Refills: 2 | COMMUNITY
Start: 2023-02-07 | End: 2023-05-08

## 2023-05-02 NOTE — PATIENT INSTRUCTIONS
Plan:  Restart jardiance 10 mg daily for improved heart function  Limited Echocardiogram in July to assess heart function  Continue all other medications without changes  Labs in 1 month: BNP, BMP  Follow up with me in July with echo

## 2023-06-01 ENCOUNTER — HOSPITAL ENCOUNTER (OUTPATIENT)
Age: 58
Discharge: HOME | End: 2023-06-01
Payer: COMMERCIAL

## 2023-06-01 DIAGNOSIS — D86.85 CARDIAC SARCOIDOSIS (HCC): ICD-10-CM

## 2023-06-01 DIAGNOSIS — I50.22 SYSTOLIC CHF, CHRONIC (HCC): ICD-10-CM

## 2023-06-01 DIAGNOSIS — I42.8 NON-ISCHEMIC CARDIOMYOPATHY (HCC): ICD-10-CM

## 2023-06-01 LAB
ANION GAP SERPL CALCULATED.3IONS-SCNC: 7 MMOL/L (ref 3–16)
BUN SERPL-MCNC: 22 MG/DL (ref 7–20)
CALCIUM SERPL-MCNC: 9.1 MG/DL (ref 8.3–10.6)
CHLORIDE SERPL-SCNC: 109 MMOL/L (ref 99–110)
CO2 SERPL-SCNC: 27 MMOL/L (ref 21–32)
CREAT SERPL-MCNC: 1.1 MG/DL (ref 0.8–1.3)
GFR SERPLBLD CREATININE-BSD FMLA CKD-EPI: >60 ML/MIN/{1.73_M2}
GLUCOSE SERPL-MCNC: 84 MG/DL (ref 70–99)
NT-PROBNP SERPL-MCNC: 596 PG/ML (ref 0–124)
POTASSIUM SERPL-SCNC: 4.4 MMOL/L (ref 3.5–5.1)
SODIUM SERPL-SCNC: 143 MMOL/L (ref 136–145)

## 2023-06-01 PROCEDURE — 83880 ASSAY OF NATRIURETIC PEPTIDE: CPT

## 2023-06-01 PROCEDURE — 36415 COLL VENOUS BLD VENIPUNCTURE: CPT

## 2023-06-01 PROCEDURE — 80048 BASIC METABOLIC PNL TOTAL CA: CPT

## 2023-06-02 ENCOUNTER — TELEPHONE (OUTPATIENT)
Dept: CARDIOLOGY CLINIC | Age: 58
End: 2023-06-02

## 2023-06-30 DIAGNOSIS — I50.22 SYSTOLIC CHF, CHRONIC (HCC): ICD-10-CM

## 2023-06-30 RX ORDER — SACUBITRIL AND VALSARTAN 97; 103 MG/1; MG/1
TABLET, FILM COATED ORAL
Qty: 30 TABLET | Refills: 1 | Status: SHIPPED | OUTPATIENT
Start: 2023-06-30 | End: 2023-07-16 | Stop reason: SDUPTHER

## 2023-07-11 ENCOUNTER — OFFICE VISIT (OUTPATIENT)
Dept: CARDIOLOGY CLINIC | Age: 58
End: 2023-07-11

## 2023-07-11 ENCOUNTER — HOSPITAL ENCOUNTER (OUTPATIENT)
Dept: CARDIOLOGY | Age: 58
Discharge: HOME | End: 2023-07-11
Payer: COMMERCIAL

## 2023-07-11 VITALS
SYSTOLIC BLOOD PRESSURE: 130 MMHG | HEART RATE: 58 BPM | DIASTOLIC BLOOD PRESSURE: 62 MMHG | OXYGEN SATURATION: 99 % | BODY MASS INDEX: 24.62 KG/M2 | HEIGHT: 70 IN | WEIGHT: 172 LBS

## 2023-07-11 DIAGNOSIS — I10 ESSENTIAL HYPERTENSION: ICD-10-CM

## 2023-07-11 DIAGNOSIS — E78.5 HYPERLIPIDEMIA, UNSPECIFIED HYPERLIPIDEMIA TYPE: ICD-10-CM

## 2023-07-11 DIAGNOSIS — I42.8 NON-ISCHEMIC CARDIOMYOPATHY (HCC): ICD-10-CM

## 2023-07-11 DIAGNOSIS — I47.29 NSVT (NONSUSTAINED VENTRICULAR TACHYCARDIA) (HCC): ICD-10-CM

## 2023-07-11 DIAGNOSIS — D86.85 CARDIAC SARCOIDOSIS (HCC): ICD-10-CM

## 2023-07-11 DIAGNOSIS — I50.22 SYSTOLIC CHF, CHRONIC (HCC): ICD-10-CM

## 2023-07-11 DIAGNOSIS — I44.7 LEFT BUNDLE BRANCH BLOCK: ICD-10-CM

## 2023-07-11 DIAGNOSIS — I50.22 SYSTOLIC CHF, CHRONIC (HCC): Primary | ICD-10-CM

## 2023-07-11 LAB
LV EF: 38 %
LVEF MODALITY: NORMAL

## 2023-07-11 PROCEDURE — 93308 TTE F-UP OR LMTD: CPT

## 2023-07-11 RX ORDER — HYDROXYCHLOROQUINE SULFATE 200 MG/1
200 TABLET, FILM COATED ORAL 2 TIMES DAILY
COMMUNITY
Start: 2023-06-10

## 2023-07-11 NOTE — PATIENT INSTRUCTIONS
Plan:  We will call with echocardiogram results   Continue current medications   Fasting labs in 3 months- lipids, CRP, ERS, CMP, and BNP   4.    Follow up with me in 3 months

## 2023-07-11 NOTE — PROGRESS NOTES
[address]  Advanced CHF/Pulmonary Hypertension   Cardiac Evaluation      Millicent Schwab Depoy  YOB: 1965    Date of Visit:  7/11/23     Chief Complaint   Patient presents with    Follow-up    Congestive Heart Failure               History of Present Illness: Katerina Gan is a 59 y.o. male who presented from referral from Dr Wale Bynum for consultation and management of advanced heart failure. PMH: nonischemic cardiomyopathy, hypertension, cardiac sarcoidosis. He presented to hospital on 10/12/20 with complaints of severe SOB, BLE edema and chest pain. Echo from 10/13/20 demonstrated EF 20-25%, moderate biatrial enlargement, mild TR and OK. His LHC/RHC 10/14/20 demonstrated severe nonischemic cardiomyopathy, no significant CAD. Cardiac MRI from 12/04/20 showed NICM concerning for inflammatory or infiltrative disease with biventricular involvement. PET scan from 12/11/20 showed abnormal FDG uptake in the lateral wall of the left ventricle and the right atrium consistent with active myocardial inflammation. Severe global HK and severely impaired LV function at 17.6%. He takes little naps during the day. He has been sleeping in his chair. He reports he was waking up feeling smothery. He has not slept in a bed since 09/2020. OV 12/22/20, his lisinopril was stopped. He was started on Entresto 1/2 tab of 49/51 mg twice daily and digoxin 125 mcg daily. He reported he was sleeping when he experienced VT on 12/13/2020. He had a 10.5 second episode of monomorphic VT with a 360 msec cycle length at 1:24PM. He states he woke up and his Lifevest showed an orange screen that said \"patient respond\", he pushed the button and went back to sleep. On 01/05/21 he reported DR Mark Lind said he was 70% positive for sarcoidosis. He has been taking prednisone 40 mg daily. DR Mark Lind started methotrexate.  He reported he did not have any more alarms from his Life Vest. He is tolerating the entresto and digoxin

## 2023-07-16 RX ORDER — ASPIRIN 81 MG/1
TABLET, CHEWABLE ORAL
Qty: 90 TABLET | Refills: 3 | Status: SHIPPED | OUTPATIENT
Start: 2023-07-16

## 2023-07-16 RX ORDER — SACUBITRIL AND VALSARTAN 97; 103 MG/1; MG/1
TABLET, FILM COATED ORAL
Qty: 180 TABLET | Refills: 3 | Status: SHIPPED | OUTPATIENT
Start: 2023-07-16

## 2023-07-16 RX ORDER — METOPROLOL SUCCINATE 50 MG/1
TABLET, EXTENDED RELEASE ORAL
Qty: 90 TABLET | Refills: 3 | Status: SHIPPED | OUTPATIENT
Start: 2023-07-16

## 2023-07-16 RX ORDER — ATORVASTATIN CALCIUM 20 MG/1
20 TABLET, FILM COATED ORAL DAILY
Qty: 90 TABLET | Refills: 3 | Status: SHIPPED | OUTPATIENT
Start: 2023-07-16

## 2023-08-07 DIAGNOSIS — R79.89 ELEVATED LFTS: Primary | ICD-10-CM

## 2023-08-09 RX ORDER — MYCOPHENOLATE MOFETIL 500 MG/1
TABLET ORAL
Qty: 180 TABLET | Refills: 3 | Status: SHIPPED | OUTPATIENT
Start: 2023-08-09

## 2023-10-13 NOTE — PROGRESS NOTES
[facility]  Advanced CHF/Pulmonary Hypertension   Cardiac Evaluation      Jaron Sandra  YOB: 1965    Date of Visit:  10/17/23     Chief Complaint   Patient presents with    3 Month Follow-Up    Congestive Heart Failure    Cardiomyopathy         History of Present Illness: Jay Ruzi is a 59 y.o. male who presented from referral from Dr Amaya Bates for consultation and management of advanced heart failure. PMH: nonischemic cardiomyopathy, hypertension, cardiac sarcoidosis. He presented to hospital on 10/12/20 with complaints of severe SOB, BLE edema and chest pain. Echo from 10/13/20 demonstrated EF 20-25%, moderate biatrial enlargement, mild TR and HI. His LHC/RHC 10/14/20 demonstrated severe nonischemic cardiomyopathy, no significant CAD. Cardiac MRI from 12/04/20 showed NICM concerning for inflammatory or infiltrative disease with biventricular involvement. PET scan from 12/11/20 showed abnormal FDG uptake in the lateral wall of the left ventricle and the right atrium consistent with active myocardial inflammation. Severe global HK and severely impaired LV function at 17.6%. He takes little naps during the day. He has been sleeping in his chair. He reports he was waking up feeling smothery. He has not slept in a bed since 09/2020. OV 12/22/20, his lisinopril was stopped. He was started on Entresto 1/2 tab of 49/51 mg twice daily and digoxin 125 mcg daily. He reported he was sleeping when he experienced VT on 12/13/2020. He had a 10.5 second episode of monomorphic VT with a 360 msec cycle length at 1:24PM. He states he woke up and his Lifevest showed an orange screen that said \"patient respond\", he pushed the button and went back to sleep. On 01/05/21 he reported DR Lesley Pedroza said he was 70% positive for sarcoidosis. He has been taking prednisone 40 mg daily. DR Lesley Pedroza started methotrexate.  He reported he did not have any more alarms from his Life Vest. He is tolerating the

## 2023-10-16 ENCOUNTER — HOSPITAL ENCOUNTER (OUTPATIENT)
Age: 58
Setting detail: SPECIMEN
Discharge: HOME | End: 2023-10-16
Payer: COMMERCIAL

## 2023-10-16 DIAGNOSIS — I50.22 SYSTOLIC CHF, CHRONIC (HCC): ICD-10-CM

## 2023-10-16 DIAGNOSIS — I42.8 NON-ISCHEMIC CARDIOMYOPATHY (HCC): ICD-10-CM

## 2023-10-16 DIAGNOSIS — I44.7 LEFT BUNDLE BRANCH BLOCK: ICD-10-CM

## 2023-10-16 LAB
ALBUMIN SERPL-MCNC: 4.6 G/DL (ref 3.4–5)
ALBUMIN/GLOB SERPL: 2.3 {RATIO} (ref 1.1–2.2)
ALP SERPL-CCNC: 50 U/L (ref 40–129)
ALT SERPL-CCNC: 13 U/L (ref 10–40)
ANION GAP SERPL CALCULATED.3IONS-SCNC: 6 MMOL/L (ref 3–16)
AST SERPL-CCNC: 16 U/L (ref 15–37)
BILIRUB SERPL-MCNC: 0.5 MG/DL (ref 0–1)
BUN SERPL-MCNC: 17 MG/DL (ref 7–20)
CALCIUM SERPL-MCNC: 9.1 MG/DL (ref 8.3–10.6)
CHLORIDE SERPL-SCNC: 106 MMOL/L (ref 99–110)
CO2 SERPL-SCNC: 29 MMOL/L (ref 21–32)
CREAT SERPL-MCNC: 1.1 MG/DL (ref 0.8–1.3)
CRP SERPL-MCNC: <3 MG/L (ref 0–5.1)
DEPRECATED RDW RBC AUTO: 13.2 % (ref 12.4–15.4)
ERYTHROCYTE [SEDIMENTATION RATE] IN BLOOD BY WESTERGREN METHOD: 2 MM/HR (ref 0–20)
GFR SERPLBLD CREATININE-BSD FMLA CKD-EPI: >60 ML/MIN/{1.73_M2}
GLUCOSE SERPL-MCNC: 95 MG/DL (ref 70–99)
HCT VFR BLD AUTO: 40.3 % (ref 40.5–52.5)
HGB BLD-MCNC: 13.8 G/DL (ref 13.5–17.5)
MCH RBC QN AUTO: 28.5 PG (ref 26–34)
MCHC RBC AUTO-ENTMCNC: 34.3 G/DL (ref 31–36)
MCV RBC AUTO: 83 FL (ref 80–100)
NT-PROBNP SERPL-MCNC: 238 PG/ML (ref 0–124)
PLATELET # BLD AUTO: 150 K/UL (ref 135–450)
PMV BLD AUTO: 8.6 FL (ref 5–10.5)
POTASSIUM SERPL-SCNC: 4.3 MMOL/L (ref 3.5–5.1)
PROT SERPL-MCNC: 6.6 G/DL (ref 6.4–8.2)
RBC # BLD AUTO: 4.86 M/UL (ref 4.2–5.9)
SODIUM SERPL-SCNC: 141 MMOL/L (ref 136–145)
WBC # BLD AUTO: 4.8 K/UL (ref 4–11)

## 2023-10-16 PROCEDURE — 36415 COLL VENOUS BLD VENIPUNCTURE: CPT

## 2023-10-16 PROCEDURE — 83880 ASSAY OF NATRIURETIC PEPTIDE: CPT

## 2023-10-16 PROCEDURE — 86140 C-REACTIVE PROTEIN: CPT

## 2023-10-16 PROCEDURE — 85027 COMPLETE CBC AUTOMATED: CPT

## 2023-10-16 PROCEDURE — 85652 RBC SED RATE AUTOMATED: CPT

## 2023-10-16 PROCEDURE — 80053 COMPREHEN METABOLIC PANEL: CPT

## 2023-10-17 ENCOUNTER — OFFICE VISIT (OUTPATIENT)
Dept: CARDIOLOGY CLINIC | Age: 58
End: 2023-10-17

## 2023-10-17 VITALS
SYSTOLIC BLOOD PRESSURE: 128 MMHG | DIASTOLIC BLOOD PRESSURE: 74 MMHG | HEART RATE: 65 BPM | OXYGEN SATURATION: 99 % | BODY MASS INDEX: 25.27 KG/M2 | HEIGHT: 70 IN | WEIGHT: 176.5 LBS

## 2023-10-17 DIAGNOSIS — I50.22 SYSTOLIC CHF, CHRONIC (HCC): Primary | ICD-10-CM

## 2023-10-17 DIAGNOSIS — R06.02 SOB (SHORTNESS OF BREATH): ICD-10-CM

## 2023-10-17 DIAGNOSIS — I42.8 NON-ISCHEMIC CARDIOMYOPATHY (HCC): ICD-10-CM

## 2023-10-17 DIAGNOSIS — I44.7 LEFT BUNDLE BRANCH BLOCK: ICD-10-CM

## 2023-10-17 DIAGNOSIS — I10 ESSENTIAL HYPERTENSION: ICD-10-CM

## 2023-10-17 DIAGNOSIS — D86.85 CARDIAC SARCOIDOSIS (HCC): ICD-10-CM

## 2023-10-17 DIAGNOSIS — E78.5 HYPERLIPIDEMIA, UNSPECIFIED HYPERLIPIDEMIA TYPE: ICD-10-CM

## 2023-10-17 DIAGNOSIS — I47.29 NSVT (NONSUSTAINED VENTRICULAR TACHYCARDIA) (HCC): ICD-10-CM

## 2023-10-17 RX ORDER — FUROSEMIDE 20 MG/1
TABLET ORAL
Qty: 135 TABLET | Refills: 3 | Status: SHIPPED | OUTPATIENT
Start: 2023-10-17

## 2023-10-17 RX ORDER — DIGOXIN 125 MCG
125 TABLET ORAL DAILY
Qty: 90 TABLET | Refills: 3 | Status: SHIPPED | OUTPATIENT
Start: 2023-10-17

## 2023-10-17 RX ORDER — SPIRONOLACTONE 25 MG/1
TABLET ORAL
Qty: 36 TABLET | Refills: 3 | Status: SHIPPED | OUTPATIENT
Start: 2023-10-17

## 2023-10-17 NOTE — PATIENT INSTRUCTIONS
Plan:  Continue current cardiac medications  Lab 6 months: fasting lipids, cbc, cmp, bnp, esr, crp  Follow up with me in 6 months

## 2023-11-27 NOTE — TELEPHONE ENCOUNTER
Dr. Peggy Salazar ok'd filling Cellcept. It was E-rx'd to [facility]; I spoke to the [facility]. I also LMOM for Mr. Sandra with an update.
Pharmacy calling to request a refill on Cellcept 500 mg. She states that she reached out to our office for a few weeks with no response. The patient only has 1 tablet left.  Please advise thanks
PAST MEDICAL HISTORY:  Obese

## 2023-12-26 DIAGNOSIS — Z79.899 MEDICATION MANAGEMENT: ICD-10-CM

## 2023-12-26 DIAGNOSIS — R79.89 ELEVATED LFTS: ICD-10-CM

## 2023-12-27 RX ORDER — FOLIC ACID 1 MG/1
1000 TABLET ORAL DAILY
Qty: 90 TABLET | Refills: 3 | Status: SHIPPED | OUTPATIENT
Start: 2023-12-27

## 2024-03-05 ENCOUNTER — TELEPHONE (OUTPATIENT)
Dept: CARDIOLOGY CLINIC | Age: 59
End: 2024-03-05

## 2024-04-04 DIAGNOSIS — K21.9 GASTROESOPHAGEAL REFLUX DISEASE WITHOUT ESOPHAGITIS: ICD-10-CM

## 2024-04-05 RX ORDER — PANTOPRAZOLE SODIUM 40 MG/1
TABLET, DELAYED RELEASE ORAL
Qty: 90 TABLET | Refills: 3 | Status: SHIPPED | OUTPATIENT
Start: 2024-04-05

## 2024-04-18 NOTE — PROGRESS NOTES
[facility]  Advanced CHF/Pulmonary Hypertension   Cardiac Evaluation      Willis Sandra  YOB: 1965    Date of Visit:  4/23/24     Chief Complaint   Patient presents with    Follow-up    Congestive Heart Failure         History of Present Illness:  Willis Sandra is a 59 y.o. male who presented from referral from Dr Javier for consultation and management of advanced heart failure. PMH: nonischemic cardiomyopathy, hypertension, cardiac sarcoidosis.   He presented to hospital on 10/12/20 with complaints of severe SOB, BLE edema and chest pain. Echo from 10/13/20 demonstrated EF 20-25%, moderate biatrial enlargement, mild TR and DE. His LHC/RHC 10/14/20 demonstrated severe nonischemic cardiomyopathy, no significant CAD.   Cardiac MRI from 12/04/20 showed NICM concerning for inflammatory or infiltrative disease with biventricular involvement. PET scan from 12/11/20 showed abnormal FDG uptake in the lateral wall of the left ventricle and the right atrium consistent with active myocardial inflammation. Severe global HK and severely impaired LV function at 17.6%.  He takes little naps during the day. He has been sleeping in his chair. He reports he was waking up feeling smothery. He has not slept in a bed since 09/2020.    OV 12/22/20, his lisinopril was stopped. He was started on Entresto 1/2 tab of 49/51 mg twice daily and digoxin 125 mcg daily.    He reported he was sleeping when he experienced VT on 12/13/2020. He had a 10.5 second episode of monomorphic VT with a 360 msec cycle length at 1:24PM. He states he woke up and his Lifevest showed an orange screen that said \"patient respond\", he pushed the button and went back to sleep.    On 01/05/21 he reported DR Garcia said he was 70% positive for sarcoidosis. He has been taking prednisone 40 mg daily. DR Garcia started methotrexate. He reported he did not have any more alarms from his Life Vest. He is tolerating the entresto and digoxin well. He

## 2024-04-19 ENCOUNTER — HOSPITAL ENCOUNTER (OUTPATIENT)
Age: 59
Discharge: HOME | End: 2024-04-19
Payer: COMMERCIAL

## 2024-04-19 DIAGNOSIS — E78.5 HYPERLIPIDEMIA, UNSPECIFIED HYPERLIPIDEMIA TYPE: ICD-10-CM

## 2024-04-19 DIAGNOSIS — D86.85 CARDIAC SARCOIDOSIS (HCC): ICD-10-CM

## 2024-04-19 LAB
ALBUMIN SERPL-MCNC: 4.5 G/DL (ref 3.4–5)
ALBUMIN/GLOB SERPL: 2 {RATIO} (ref 1.1–2.2)
ALP SERPL-CCNC: 51 U/L (ref 40–129)
ALT SERPL-CCNC: 16 U/L (ref 10–40)
ANION GAP SERPL CALCULATED.3IONS-SCNC: 10 MMOL/L (ref 3–16)
AST SERPL-CCNC: 21 U/L (ref 15–37)
BASOPHILS # BLD: 0 K/UL (ref 0–0.2)
BASOPHILS NFR BLD: 0.5 %
BILIRUB SERPL-MCNC: 0.6 MG/DL (ref 0–1)
BUN SERPL-MCNC: 20 MG/DL (ref 7–20)
CALCIUM SERPL-MCNC: 9.5 MG/DL (ref 8.3–10.6)
CHLORIDE SERPL-SCNC: 107 MMOL/L (ref 99–110)
CHOLEST SERPL-MCNC: 173 MG/DL (ref 0–199)
CO2 SERPL-SCNC: 25 MMOL/L (ref 21–32)
CREAT SERPL-MCNC: 1.1 MG/DL (ref 0.8–1.3)
CRP SERPL-MCNC: <3 MG/L (ref 0–5.1)
DEPRECATED RDW RBC AUTO: 13.3 % (ref 12.4–15.4)
EOSINOPHIL # BLD: 0.2 K/UL (ref 0–0.6)
EOSINOPHIL NFR BLD: 3.5 %
ERYTHROCYTE [SEDIMENTATION RATE] IN BLOOD BY WESTERGREN METHOD: 2 MM/HR (ref 0–20)
GFR SERPLBLD CREATININE-BSD FMLA CKD-EPI: 76 ML/MIN/{1.73_M2}
GLUCOSE SERPL-MCNC: 96 MG/DL (ref 70–99)
HCT VFR BLD AUTO: 40.2 % (ref 40.5–52.5)
HDLC SERPL-MCNC: 41 MG/DL (ref 40–60)
HGB BLD-MCNC: 13.7 G/DL (ref 13.5–17.5)
LDLC SERPL CALC-MCNC: 113 MG/DL
LYMPHOCYTES # BLD: 1.5 K/UL (ref 1–5.1)
LYMPHOCYTES NFR BLD: 28.2 %
MCH RBC QN AUTO: 28.4 PG (ref 26–34)
MCHC RBC AUTO-ENTMCNC: 34.2 G/DL (ref 31–36)
MCV RBC AUTO: 83.2 FL (ref 80–100)
MONOCYTES # BLD: 0.4 K/UL (ref 0–1.3)
MONOCYTES NFR BLD: 7.9 %
NEUTROPHILS # BLD: 3.2 K/UL (ref 1.7–7.7)
NEUTROPHILS NFR BLD: 59.9 %
NT-PROBNP SERPL-MCNC: 157 PG/ML (ref 0–124)
PLATELET # BLD AUTO: 163 K/UL (ref 135–450)
PMV BLD AUTO: 8.4 FL (ref 5–10.5)
POTASSIUM SERPL-SCNC: 4.5 MMOL/L (ref 3.5–5.1)
PROT SERPL-MCNC: 6.8 G/DL (ref 6.4–8.2)
RBC # BLD AUTO: 4.83 M/UL (ref 4.2–5.9)
SODIUM SERPL-SCNC: 142 MMOL/L (ref 136–145)
TRIGL SERPL-MCNC: 96 MG/DL (ref 0–150)
VLDLC SERPL CALC-MCNC: 19 MG/DL
WBC # BLD AUTO: 5.3 K/UL (ref 4–11)

## 2024-04-19 PROCEDURE — 80053 COMPREHEN METABOLIC PANEL: CPT

## 2024-04-19 PROCEDURE — 80061 LIPID PANEL: CPT

## 2024-04-19 PROCEDURE — 85652 RBC SED RATE AUTOMATED: CPT

## 2024-04-19 PROCEDURE — 85025 COMPLETE CBC W/AUTO DIFF WBC: CPT

## 2024-04-19 PROCEDURE — 36415 COLL VENOUS BLD VENIPUNCTURE: CPT

## 2024-04-19 PROCEDURE — 86140 C-REACTIVE PROTEIN: CPT

## 2024-04-19 PROCEDURE — 83880 ASSAY OF NATRIURETIC PEPTIDE: CPT

## 2024-04-23 ENCOUNTER — OFFICE VISIT (OUTPATIENT)
Dept: CARDIOLOGY CLINIC | Age: 59
End: 2024-04-23
Payer: COMMERCIAL

## 2024-04-23 VITALS
WEIGHT: 183 LBS | OXYGEN SATURATION: 99 % | DIASTOLIC BLOOD PRESSURE: 78 MMHG | HEART RATE: 66 BPM | BODY MASS INDEX: 26.2 KG/M2 | SYSTOLIC BLOOD PRESSURE: 118 MMHG | HEIGHT: 70 IN

## 2024-04-23 DIAGNOSIS — E78.5 HYPERLIPIDEMIA, UNSPECIFIED HYPERLIPIDEMIA TYPE: ICD-10-CM

## 2024-04-23 DIAGNOSIS — R06.02 SOB (SHORTNESS OF BREATH): ICD-10-CM

## 2024-04-23 DIAGNOSIS — I10 ESSENTIAL HYPERTENSION: ICD-10-CM

## 2024-04-23 DIAGNOSIS — D86.85 CARDIAC SARCOIDOSIS (HCC): ICD-10-CM

## 2024-04-23 DIAGNOSIS — I50.22 SYSTOLIC CHF, CHRONIC (HCC): Primary | ICD-10-CM

## 2024-04-23 DIAGNOSIS — I44.7 LEFT BUNDLE BRANCH BLOCK: ICD-10-CM

## 2024-04-23 PROCEDURE — 3074F SYST BP LT 130 MM HG: CPT | Performed by: INTERNAL MEDICINE

## 2024-04-23 PROCEDURE — 99215 OFFICE O/P EST HI 40 MIN: CPT | Performed by: INTERNAL MEDICINE

## 2024-04-23 PROCEDURE — 3078F DIAST BP <80 MM HG: CPT | Performed by: INTERNAL MEDICINE

## 2024-04-23 NOTE — PATIENT INSTRUCTIONS
Plan:  Continue current cardiac medications  Labs: fasting lipids, cbc, cmp, bnp  Follow up 6 months

## 2024-06-25 DIAGNOSIS — R79.89 ELEVATED LFTS: ICD-10-CM

## 2024-06-25 DIAGNOSIS — I10 ESSENTIAL HYPERTENSION: ICD-10-CM

## 2024-06-25 DIAGNOSIS — I42.8 NON-ISCHEMIC CARDIOMYOPATHY (HCC): ICD-10-CM

## 2024-06-25 DIAGNOSIS — I47.29 NSVT (NONSUSTAINED VENTRICULAR TACHYCARDIA) (HCC): ICD-10-CM

## 2024-06-25 DIAGNOSIS — R06.02 SOB (SHORTNESS OF BREATH): ICD-10-CM

## 2024-06-25 DIAGNOSIS — I50.22 SYSTOLIC CHF, CHRONIC (HCC): ICD-10-CM

## 2024-06-26 RX ORDER — SPIRONOLACTONE 25 MG/1
TABLET ORAL
Qty: 36 TABLET | Refills: 3 | Status: SHIPPED | OUTPATIENT
Start: 2024-06-26

## 2024-06-26 RX ORDER — MYCOPHENOLATE MOFETIL 500 MG/1
500 TABLET ORAL 2 TIMES DAILY
Qty: 180 TABLET | Refills: 3 | Status: SHIPPED | OUTPATIENT
Start: 2024-06-26

## 2024-06-26 RX ORDER — METOPROLOL SUCCINATE 50 MG/1
TABLET, EXTENDED RELEASE ORAL
Qty: 90 TABLET | Refills: 3 | Status: SHIPPED | OUTPATIENT
Start: 2024-06-26

## 2024-07-22 ENCOUNTER — HOSPITAL ENCOUNTER (OUTPATIENT)
Age: 59
Discharge: HOME | End: 2024-07-22
Payer: COMMERCIAL

## 2024-07-22 LAB
BASOPHILS # BLD: 0 K/UL (ref 0–0.2)
BASOPHILS NFR BLD: 0.8 %
DEPRECATED RDW RBC AUTO: 13.4 % (ref 12.4–15.4)
EOSINOPHIL # BLD: 0.2 K/UL (ref 0–0.6)
EOSINOPHIL NFR BLD: 3.2 %
EST. AVERAGE GLUCOSE BLD GHB EST-MCNC: 114 MG/DL
HBA1C MFR BLD: 5.6 %
HCT VFR BLD AUTO: 40 % (ref 40.5–52.5)
HGB BLD-MCNC: 13.6 G/DL (ref 13.5–17.5)
LYMPHOCYTES # BLD: 1.3 K/UL (ref 1–5.1)
LYMPHOCYTES NFR BLD: 24.3 %
MCH RBC QN AUTO: 28.3 PG (ref 26–34)
MCHC RBC AUTO-ENTMCNC: 33.9 G/DL (ref 31–36)
MCV RBC AUTO: 83.4 FL (ref 80–100)
MONOCYTES # BLD: 0.4 K/UL (ref 0–1.3)
MONOCYTES NFR BLD: 8 %
NEUTROPHILS # BLD: 3.3 K/UL (ref 1.7–7.7)
NEUTROPHILS NFR BLD: 63.7 %
PLATELET # BLD AUTO: 164 K/UL (ref 135–450)
PMV BLD AUTO: 8.6 FL (ref 5–10.5)
PSA SERPL DL<=0.01 NG/ML-MCNC: 1.93 NG/ML (ref 0–4)
RBC # BLD AUTO: 4.8 M/UL (ref 4.2–5.9)
WBC # BLD AUTO: 5.2 K/UL (ref 4–11)

## 2024-07-22 PROCEDURE — 85025 COMPLETE CBC W/AUTO DIFF WBC: CPT

## 2024-07-22 PROCEDURE — 83036 HEMOGLOBIN GLYCOSYLATED A1C: CPT

## 2024-07-22 PROCEDURE — 84153 ASSAY OF PSA TOTAL: CPT

## 2024-07-22 PROCEDURE — 36415 COLL VENOUS BLD VENIPUNCTURE: CPT

## 2024-07-22 PROCEDURE — 87522 HEPATITIS C REVRS TRNSCRPJ: CPT

## 2024-07-24 LAB
HCV RNA SERPL NAA+PROBE-ACNC: NOT DETECTED IU/ML
HCV RNA SERPL NAA+PROBE-LOG IU: NOT DETECTED LOG IU/ML
HCV RNA SERPL QL NAA+PROBE: NOT DETECTED

## 2024-08-24 DIAGNOSIS — I42.8 NON-ISCHEMIC CARDIOMYOPATHY (HCC): ICD-10-CM

## 2024-08-24 DIAGNOSIS — I50.22 SYSTOLIC CHF, CHRONIC (HCC): ICD-10-CM

## 2024-08-24 DIAGNOSIS — E78.5 HYPERLIPIDEMIA, UNSPECIFIED HYPERLIPIDEMIA TYPE: ICD-10-CM

## 2024-08-24 DIAGNOSIS — R06.02 SOB (SHORTNESS OF BREATH): ICD-10-CM

## 2024-08-26 RX ORDER — FUROSEMIDE 20 MG
TABLET ORAL
Qty: 135 TABLET | Refills: 0 | Status: SHIPPED | OUTPATIENT
Start: 2024-08-26

## 2024-08-26 RX ORDER — ATORVASTATIN CALCIUM 20 MG/1
20 TABLET, FILM COATED ORAL DAILY
Qty: 90 TABLET | Refills: 0 | Status: SHIPPED | OUTPATIENT
Start: 2024-08-26

## 2024-08-26 RX ORDER — SACUBITRIL AND VALSARTAN 97; 103 MG/1; MG/1
TABLET, FILM COATED ORAL
Qty: 180 TABLET | Refills: 0 | Status: SHIPPED | OUTPATIENT
Start: 2024-08-26

## 2024-10-18 DIAGNOSIS — I42.8 NON-ISCHEMIC CARDIOMYOPATHY (HCC): ICD-10-CM

## 2024-10-18 DIAGNOSIS — I50.22 SYSTOLIC CHF, CHRONIC (HCC): ICD-10-CM

## 2024-10-18 DIAGNOSIS — D86.85 CARDIAC SARCOIDOSIS: ICD-10-CM

## 2024-10-18 DIAGNOSIS — E78.5 HYPERLIPIDEMIA, UNSPECIFIED HYPERLIPIDEMIA TYPE: ICD-10-CM

## 2024-10-18 DIAGNOSIS — I47.29 NSVT (NONSUSTAINED VENTRICULAR TACHYCARDIA) (HCC): ICD-10-CM

## 2024-10-21 RX ORDER — DIGOXIN 125 MCG
125 TABLET ORAL DAILY
Qty: 90 TABLET | Refills: 3 | Status: SHIPPED | OUTPATIENT
Start: 2024-10-21

## 2024-10-21 RX ORDER — SACUBITRIL AND VALSARTAN 97; 103 MG/1; MG/1
TABLET, FILM COATED ORAL
Qty: 180 TABLET | Refills: 3 | Status: SHIPPED | OUTPATIENT
Start: 2024-10-21

## 2024-10-21 RX ORDER — EMPAGLIFLOZIN 10 MG/1
10 TABLET, FILM COATED ORAL DAILY
Qty: 90 TABLET | Refills: 3 | Status: SHIPPED | OUTPATIENT
Start: 2024-10-21

## 2024-10-21 RX ORDER — ASPIRIN 81 MG/1
TABLET, CHEWABLE ORAL
Qty: 90 TABLET | Refills: 3 | Status: SHIPPED | OUTPATIENT
Start: 2024-10-21

## 2024-10-21 RX ORDER — ATORVASTATIN CALCIUM 20 MG/1
20 TABLET, FILM COATED ORAL DAILY
Qty: 90 TABLET | Refills: 3 | Status: SHIPPED | OUTPATIENT
Start: 2024-10-21

## 2024-10-26 DIAGNOSIS — R79.89 ELEVATED LFTS: ICD-10-CM

## 2024-10-26 DIAGNOSIS — R06.02 SOB (SHORTNESS OF BREATH): ICD-10-CM

## 2024-10-26 DIAGNOSIS — I50.22 SYSTOLIC CHF, CHRONIC (HCC): ICD-10-CM

## 2024-10-26 DIAGNOSIS — I42.8 NON-ISCHEMIC CARDIOMYOPATHY (HCC): ICD-10-CM

## 2024-10-26 DIAGNOSIS — Z79.899 MEDICATION MANAGEMENT: ICD-10-CM

## 2024-10-28 RX ORDER — FOLIC ACID 1 MG/1
1000 TABLET ORAL DAILY
Qty: 90 TABLET | Refills: 3 | Status: SHIPPED | OUTPATIENT
Start: 2024-10-28

## 2024-10-28 RX ORDER — FUROSEMIDE 20 MG/1
TABLET ORAL
Qty: 60 TABLET | Refills: 3 | Status: SHIPPED | OUTPATIENT
Start: 2024-10-28

## 2024-10-31 ENCOUNTER — HOSPITAL ENCOUNTER (OUTPATIENT)
Age: 59
Discharge: HOME | End: 2024-10-31
Payer: COMMERCIAL

## 2024-10-31 DIAGNOSIS — I50.22 SYSTOLIC CHF, CHRONIC (HCC): ICD-10-CM

## 2024-10-31 DIAGNOSIS — E78.5 HYPERLIPIDEMIA, UNSPECIFIED HYPERLIPIDEMIA TYPE: ICD-10-CM

## 2024-10-31 DIAGNOSIS — R06.02 SOB (SHORTNESS OF BREATH): ICD-10-CM

## 2024-10-31 LAB
ALBUMIN SERPL-MCNC: 4.5 G/DL (ref 3.4–5)
ALBUMIN/GLOB SERPL: 1.9 {RATIO} (ref 1.1–2.2)
ALP SERPL-CCNC: 56 U/L (ref 40–129)
ALT SERPL-CCNC: 22 U/L (ref 10–40)
ANION GAP SERPL CALCULATED.3IONS-SCNC: 9 MMOL/L (ref 3–16)
AST SERPL-CCNC: 23 U/L (ref 15–37)
BASOPHILS # BLD: 0 K/UL (ref 0–0.2)
BASOPHILS NFR BLD: 0.3 %
BILIRUB SERPL-MCNC: 0.6 MG/DL (ref 0–1)
BUN SERPL-MCNC: 19 MG/DL (ref 7–20)
CALCIUM SERPL-MCNC: 9.5 MG/DL (ref 8.3–10.6)
CHLORIDE SERPL-SCNC: 107 MMOL/L (ref 99–110)
CHOLEST SERPL-MCNC: 147 MG/DL (ref 0–199)
CO2 SERPL-SCNC: 24 MMOL/L (ref 21–32)
CREAT SERPL-MCNC: 1.2 MG/DL (ref 0.8–1.3)
DEPRECATED RDW RBC AUTO: 13.5 % (ref 12.4–15.4)
EOSINOPHIL # BLD: 0.2 K/UL (ref 0–0.6)
EOSINOPHIL NFR BLD: 3.8 %
GFR SERPLBLD CREATININE-BSD FMLA CKD-EPI: 68 ML/MIN/{1.73_M2}
GLUCOSE SERPL-MCNC: 105 MG/DL (ref 70–99)
HCT VFR BLD AUTO: 43.2 % (ref 40.5–52.5)
HDLC SERPL-MCNC: 35 MG/DL (ref 40–60)
HGB BLD-MCNC: 14.7 G/DL (ref 13.5–17.5)
LDLC SERPL CALC-MCNC: 89 MG/DL
LYMPHOCYTES # BLD: 1.4 K/UL (ref 1–5.1)
LYMPHOCYTES NFR BLD: 24.8 %
MCH RBC QN AUTO: 28.5 PG (ref 26–34)
MCHC RBC AUTO-ENTMCNC: 34.1 G/DL (ref 31–36)
MCV RBC AUTO: 83.6 FL (ref 80–100)
MONOCYTES # BLD: 0.5 K/UL (ref 0–1.3)
MONOCYTES NFR BLD: 8.1 %
NEUTROPHILS # BLD: 3.6 K/UL (ref 1.7–7.7)
NEUTROPHILS NFR BLD: 63 %
NT-PROBNP SERPL-MCNC: 80 PG/ML (ref 0–124)
PLATELET # BLD AUTO: 188 K/UL (ref 135–450)
PMV BLD AUTO: 8.6 FL (ref 5–10.5)
POTASSIUM SERPL-SCNC: 4.7 MMOL/L (ref 3.5–5.1)
PROT SERPL-MCNC: 6.9 G/DL (ref 6.4–8.2)
RBC # BLD AUTO: 5.16 M/UL (ref 4.2–5.9)
SODIUM SERPL-SCNC: 140 MMOL/L (ref 136–145)
TRIGL SERPL-MCNC: 114 MG/DL (ref 0–150)
VLDLC SERPL CALC-MCNC: 23 MG/DL
WBC # BLD AUTO: 5.7 K/UL (ref 4–11)

## 2024-10-31 PROCEDURE — 80053 COMPREHEN METABOLIC PANEL: CPT

## 2024-10-31 PROCEDURE — 36415 COLL VENOUS BLD VENIPUNCTURE: CPT

## 2024-10-31 PROCEDURE — 83880 ASSAY OF NATRIURETIC PEPTIDE: CPT

## 2024-10-31 PROCEDURE — 80061 LIPID PANEL: CPT

## 2024-10-31 PROCEDURE — 85025 COMPLETE CBC W/AUTO DIFF WBC: CPT

## 2024-10-31 NOTE — PROGRESS NOTES
systolic function is moderate to severely reduced with   an ejection fraction of 25%.   Global hypokinesis.    Muga: 03/24/21  MUGA Conclusions  Abnormal resting left ventricular function with EF= 42.9% . Unable to  determine wall motion on current images.  Life Vest discontinued.    ECHO 11/2021:    Summary   The left ventricular systolic function is moderate to severely reduced with   an ejection fraction of 30-35 %.   Grade I diastolic dysfunction with normal filing pressure.   Trace mitral, pulmonic and tricuspid regurgitation.    MUGA 12/2021:       MUGA Conclusions    Abnormal resting left ventricular function with moderate global hypokinesis    (but more pronounced in septum) and EF= 35% .         Cardiac PET 4/1/2022 @ [facility]:  IMPRESSION:   1. No FDG uptake within the myocardium to indicate active myocardial inflammation/granulomatous disease.   2.  No FDG uptake elsewhere in the imaged body to indicate active inflammation/granulomatous disease.   3.  Left ventricular dysfunction with resting perfusion defects in the septum, apex, and inferior wall. Left ventricular ejection fraction:  25 %     MRI Cardiac 1/24/2023  LVEF 22.97 %      IMPRESSION:  1. Dilated left ventricular size and severely and globally reduced left ventricular systolic   function.  2. Dilated right ventricular size and severely reduced right ventricular systolic function.  3.The first pass perfusion study showed no significant resting perfusion defect. No significant   intra cardiac shunt.  4. The T1  values are at upper limit of normal, T2, T2 * values are within normal limit,   suggestive of no significant myocardial edema or any evidence of iron overload.  5. The delayed enhancement study showed mild patchy enhancement in the epicardial basal septal   and mid myocardial inferior wall segment, could be related to cardiac sarcoidosis vs prior   myocarditis. Compared to prior study the degree of delayed enhancement is reduced but

## 2024-11-05 ENCOUNTER — OFFICE VISIT (OUTPATIENT)
Dept: CARDIOLOGY CLINIC | Age: 59
End: 2024-11-05

## 2024-11-05 VITALS
SYSTOLIC BLOOD PRESSURE: 114 MMHG | DIASTOLIC BLOOD PRESSURE: 62 MMHG | HEART RATE: 63 BPM | HEIGHT: 70 IN | OXYGEN SATURATION: 97 % | BODY MASS INDEX: 27.13 KG/M2 | WEIGHT: 189.5 LBS

## 2024-11-05 DIAGNOSIS — R06.02 SOB (SHORTNESS OF BREATH): ICD-10-CM

## 2024-11-05 DIAGNOSIS — I50.22 SYSTOLIC CHF, CHRONIC (HCC): Primary | ICD-10-CM

## 2024-11-05 DIAGNOSIS — D86.85 CARDIAC SARCOIDOSIS: ICD-10-CM

## 2024-11-05 DIAGNOSIS — I42.8 NON-ISCHEMIC CARDIOMYOPATHY (HCC): ICD-10-CM

## 2024-11-05 DIAGNOSIS — Z79.899 MEDICATION MANAGEMENT: ICD-10-CM

## 2024-11-05 RX ORDER — TRIAMCINOLONE ACETONIDE 1 MG/ML
LOTION TOPICAL
COMMUNITY
Start: 2024-07-22

## 2024-11-05 NOTE — PATIENT INSTRUCTIONS
Plan:  Continue current cardiac medications  Labs 6 months: fasting lipids, cbc, cmp, bnp  Follow up in 6 months

## 2025-01-12 DIAGNOSIS — R79.89 ELEVATED LFTS: ICD-10-CM

## 2025-01-14 RX ORDER — MYCOPHENOLATE MOFETIL 500 MG/1
500 TABLET ORAL 2 TIMES DAILY
Qty: 180 TABLET | Refills: 3 | Status: SHIPPED | OUTPATIENT
Start: 2025-01-14

## 2025-01-21 ENCOUNTER — TELEPHONE (OUTPATIENT)
Dept: CARDIOLOGY CLINIC | Age: 60
End: 2025-01-21

## 2025-01-21 NOTE — TELEPHONE ENCOUNTER
Pt stated that he had a coupon card to help with the cost of Entresto last year. Pt would like to know if we have any coupon cards available for this year. Please advise.

## 2025-01-21 NOTE — TELEPHONE ENCOUNTER
LMOM for patient to call back. We do not have any now, the rep will be dropping off some new ones. He can go online and get one though.

## 2025-01-21 NOTE — TELEPHONE ENCOUNTER
Pt returned call, I relayed that the rep would be bringing in new cards for the year. V/U Pt said he would like a call when new cards are in if possible. Pt also said that he would try to see if he could get them online but wasn't sure if he could figure it out. So would like a call when they're in. Thanks!

## 2025-02-20 DIAGNOSIS — I50.22 SYSTOLIC CHF, CHRONIC (HCC): ICD-10-CM

## 2025-02-20 DIAGNOSIS — I42.8 NON-ISCHEMIC CARDIOMYOPATHY (HCC): ICD-10-CM

## 2025-02-20 DIAGNOSIS — R06.02 SOB (SHORTNESS OF BREATH): ICD-10-CM

## 2025-02-20 RX ORDER — FUROSEMIDE 20 MG/1
TABLET ORAL
Qty: 60 TABLET | Refills: 0 | Status: SHIPPED | OUTPATIENT
Start: 2025-02-20

## 2025-05-01 NOTE — PROGRESS NOTES
[facility]  Advanced CHF/Pulmonary Hypertension   Cardiac Evaluation      Willis Sandra  YOB: 1965    Date of Visit:  5/6/25     Chief Complaint   Patient presents with    Follow-up    Congestive Heart Failure         History of Present Illness:  Willis Sandra is a 60 y.o. male who presented from referral from Dr Javier for consultation and management of advanced heart failure. PMH: nonischemic cardiomyopathy, hypertension, cardiac sarcoidosis.   He presented to hospital on 10/12/20 with complaints of severe SOB, BLE edema and chest pain. Echo from 10/13/20 demonstrated EF 20-25%, moderate biatrial enlargement, mild TR and IA. His LHC/RHC 10/14/20 demonstrated severe nonischemic cardiomyopathy, no significant CAD.   Cardiac MRI from 12/04/20 showed NICM concerning for inflammatory or infiltrative disease with biventricular involvement. PET scan from 12/11/20 showed abnormal FDG uptake in the lateral wall of the left ventricle and the right atrium consistent with active myocardial inflammation. Severe global HK and severely impaired LV function at 17.6%.  He takes little naps during the day. He has been sleeping in his chair. He reports he was waking up feeling smothery. He has not slept in a bed since 09/2020.    OV 12/22/20, his lisinopril was stopped. He was started on Entresto 1/2 tab of 49/51 mg twice daily and digoxin 125 mcg daily.    He reported he was sleeping when he experienced VT on 12/13/2020. He had a 10.5 second episode of monomorphic VT with a 360 msec cycle length at 1:24PM   On 01/05/21 he reported DR Garcia said he was 70% positive for sarcoidosis. He has been taking prednisone 40 mg daily. DR Garcia started methotrexate. He reported he did not have any more alarms from his Life Vest. He was started on spironolactone 25 mg M,W,F and his lasix was adjusted to 20 mg on M,W,F and 40 mg all other days at this OV.   Limited echo from 01/19/21 showed his EF was 25%.    OV

## 2025-05-05 DIAGNOSIS — Z79.899 MEDICATION MANAGEMENT: ICD-10-CM

## 2025-05-05 DIAGNOSIS — I42.8 NON-ISCHEMIC CARDIOMYOPATHY (HCC): ICD-10-CM

## 2025-05-05 DIAGNOSIS — I50.22 SYSTOLIC CHF, CHRONIC (HCC): ICD-10-CM

## 2025-05-06 ENCOUNTER — OFFICE VISIT (OUTPATIENT)
Dept: CARDIOLOGY CLINIC | Age: 60
End: 2025-05-06
Payer: COMMERCIAL

## 2025-05-06 ENCOUNTER — RESULTS FOLLOW-UP (OUTPATIENT)
Dept: CARDIOLOGY CLINIC | Age: 60
End: 2025-05-06

## 2025-05-06 VITALS
HEIGHT: 70 IN | SYSTOLIC BLOOD PRESSURE: 124 MMHG | OXYGEN SATURATION: 99 % | WEIGHT: 189 LBS | HEART RATE: 58 BPM | DIASTOLIC BLOOD PRESSURE: 70 MMHG | BODY MASS INDEX: 27.06 KG/M2

## 2025-05-06 DIAGNOSIS — D86.9 SARCOIDOSIS: ICD-10-CM

## 2025-05-06 DIAGNOSIS — I42.9 CARDIOMYOPATHY, UNSPECIFIED TYPE (HCC): ICD-10-CM

## 2025-05-06 DIAGNOSIS — D86.85 CARDIAC SARCOIDOSIS: ICD-10-CM

## 2025-05-06 DIAGNOSIS — I10 ESSENTIAL HYPERTENSION: ICD-10-CM

## 2025-05-06 DIAGNOSIS — E78.5 HYPERLIPIDEMIA, UNSPECIFIED HYPERLIPIDEMIA TYPE: ICD-10-CM

## 2025-05-06 DIAGNOSIS — I50.22 SYSTOLIC CHF, CHRONIC (HCC): Primary | ICD-10-CM

## 2025-05-06 DIAGNOSIS — R06.02 SOB (SHORTNESS OF BREATH): ICD-10-CM

## 2025-05-06 DIAGNOSIS — I47.29 NSVT (NONSUSTAINED VENTRICULAR TACHYCARDIA) (HCC): ICD-10-CM

## 2025-05-06 DIAGNOSIS — I42.8 NON-ISCHEMIC CARDIOMYOPATHY (HCC): ICD-10-CM

## 2025-05-06 DIAGNOSIS — K21.9 GASTROESOPHAGEAL REFLUX DISEASE WITHOUT ESOPHAGITIS: ICD-10-CM

## 2025-05-06 LAB
ALBUMIN SERPL-MCNC: 4.6 G/DL (ref 3.4–5)
ALBUMIN/GLOB SERPL: 2.3 {RATIO} (ref 1.1–2.2)
ALP SERPL-CCNC: 55 U/L (ref 40–129)
ALT SERPL-CCNC: 21 U/L (ref 10–40)
ANION GAP SERPL CALCULATED.3IONS-SCNC: 8 MMOL/L (ref 3–16)
AST SERPL-CCNC: 19 U/L (ref 15–37)
BASOPHILS # BLD: 0.1 K/UL (ref 0–0.2)
BASOPHILS NFR BLD: 1 %
BILIRUB SERPL-MCNC: 0.4 MG/DL (ref 0–1)
BUN SERPL-MCNC: 19 MG/DL (ref 7–20)
CALCIUM SERPL-MCNC: 9.3 MG/DL (ref 8.3–10.6)
CHLORIDE SERPL-SCNC: 105 MMOL/L (ref 99–110)
CHOLEST SERPL-MCNC: 151 MG/DL (ref 0–199)
CO2 SERPL-SCNC: 28 MMOL/L (ref 21–32)
CREAT SERPL-MCNC: 1.2 MG/DL (ref 0.8–1.3)
DEPRECATED RDW RBC AUTO: 14.4 % (ref 12.4–15.4)
EOSINOPHIL # BLD: 0.1 K/UL (ref 0–0.6)
EOSINOPHIL NFR BLD: 1.6 %
GFR SERPLBLD CREATININE-BSD FMLA CKD-EPI: 68 ML/MIN/{1.73_M2}
GLUCOSE SERPL-MCNC: 100 MG/DL (ref 70–99)
HCT VFR BLD AUTO: 40.2 % (ref 40.5–52.5)
HDLC SERPL-MCNC: 38 MG/DL (ref 40–60)
HGB BLD-MCNC: 13.4 G/DL (ref 13.5–17.5)
LDLC SERPL CALC-MCNC: 85 MG/DL
LYMPHOCYTES # BLD: 1.6 K/UL (ref 1–5.1)
LYMPHOCYTES NFR BLD: 26.2 %
MCH RBC QN AUTO: 27.9 PG (ref 26–34)
MCHC RBC AUTO-ENTMCNC: 33.4 G/DL (ref 31–36)
MCV RBC AUTO: 83.4 FL (ref 80–100)
MONOCYTES # BLD: 0.4 K/UL (ref 0–1.3)
MONOCYTES NFR BLD: 6.6 %
NEUTROPHILS # BLD: 3.9 K/UL (ref 1.7–7.7)
NEUTROPHILS NFR BLD: 64.6 %
NT-PROBNP SERPL-MCNC: 83 PG/ML (ref 0–124)
PLATELET # BLD AUTO: 173 K/UL (ref 135–450)
PMV BLD AUTO: 9 FL (ref 5–10.5)
POTASSIUM SERPL-SCNC: 4 MMOL/L (ref 3.5–5.1)
PROT SERPL-MCNC: 6.6 G/DL (ref 6.4–8.2)
RBC # BLD AUTO: 4.82 M/UL (ref 4.2–5.9)
SODIUM SERPL-SCNC: 141 MMOL/L (ref 136–145)
TRIGL SERPL-MCNC: 142 MG/DL (ref 0–150)
VLDLC SERPL CALC-MCNC: 28 MG/DL
WBC # BLD AUTO: 6.1 K/UL (ref 4–11)

## 2025-05-06 PROCEDURE — 3074F SYST BP LT 130 MM HG: CPT | Performed by: INTERNAL MEDICINE

## 2025-05-06 PROCEDURE — 99215 OFFICE O/P EST HI 40 MIN: CPT | Performed by: INTERNAL MEDICINE

## 2025-05-06 PROCEDURE — 3078F DIAST BP <80 MM HG: CPT | Performed by: INTERNAL MEDICINE

## 2025-05-06 PROCEDURE — G2211 COMPLEX E/M VISIT ADD ON: HCPCS | Performed by: INTERNAL MEDICINE

## 2025-05-06 RX ORDER — SPIRONOLACTONE 25 MG/1
TABLET ORAL
Qty: 36 TABLET | Refills: 3 | Status: SHIPPED | OUTPATIENT
Start: 2025-05-06

## 2025-05-06 RX ORDER — METOPROLOL SUCCINATE 50 MG/1
TABLET, EXTENDED RELEASE ORAL
Qty: 90 TABLET | Refills: 3 | Status: SHIPPED | OUTPATIENT
Start: 2025-05-06

## 2025-05-06 RX ORDER — FUROSEMIDE 20 MG/1
TABLET ORAL
Qty: 90 TABLET | Refills: 3 | Status: SHIPPED | OUTPATIENT
Start: 2025-05-06

## 2025-05-06 RX ORDER — PANTOPRAZOLE SODIUM 40 MG/1
40 TABLET, DELAYED RELEASE ORAL
Qty: 90 TABLET | Refills: 3 | Status: SHIPPED | OUTPATIENT
Start: 2025-05-06

## 2025-05-06 NOTE — PATIENT INSTRUCTIONS
Your provider has ordered testing for further evaluation.  An order/prescription has been included in your paper work.   To schedule outpatient testing, contact Central Scheduling by calling [employer] ([phone]).

## 2025-05-18 ENCOUNTER — HOSPITAL ENCOUNTER (OUTPATIENT)
Age: 60
Setting detail: OBSERVATION
Discharge: HOME | End: 2025-05-19
Attending: EMERGENCY MEDICINE | Admitting: INTERNAL MEDICINE
Payer: COMMERCIAL

## 2025-05-18 DIAGNOSIS — T50.901A ACCIDENTAL OVERDOSE, INITIAL ENCOUNTER: Primary | ICD-10-CM

## 2025-05-18 LAB
ANION GAP SERPL CALCULATED.3IONS-SCNC: 11 MMOL/L (ref 3–16)
ANION GAP SERPL CALCULATED.3IONS-SCNC: 12 MMOL/L (ref 3–16)
APAP SERPL-MCNC: <5 UG/ML (ref 10–30)
BASOPHILS # BLD: 0 K/UL (ref 0–0.2)
BASOPHILS NFR BLD: 0.3 %
BILIRUB UR QL STRIP.AUTO: NEGATIVE
BUN SERPL-MCNC: 14 MG/DL (ref 7–20)
BUN SERPL-MCNC: 18 MG/DL (ref 7–20)
CALCIUM SERPL-MCNC: 9.3 MG/DL (ref 8.3–10.6)
CALCIUM SERPL-MCNC: 9.8 MG/DL (ref 8.3–10.6)
CHLORIDE SERPL-SCNC: 103 MMOL/L (ref 99–110)
CHLORIDE SERPL-SCNC: 103 MMOL/L (ref 99–110)
CLARITY UR: CLEAR
CO2 SERPL-SCNC: 28 MMOL/L (ref 21–32)
CO2 SERPL-SCNC: 28 MMOL/L (ref 21–32)
COLOR UR: YELLOW
CREAT SERPL-MCNC: 1 MG/DL (ref 0.8–1.3)
CREAT SERPL-MCNC: 1.1 MG/DL (ref 0.8–1.3)
DEPRECATED RDW RBC AUTO: 13.6 % (ref 12.4–15.4)
EKG ATRIAL RATE: 71 BPM
EKG DIAGNOSIS: NORMAL
EKG P AXIS: 18 DEGREES
EKG P-R INTERVAL: 200 MS
EKG Q-T INTERVAL: 444 MS
EKG QRS DURATION: 162 MS
EKG QTC CALCULATION (BAZETT): 482 MS
EKG R AXIS: -13 DEGREES
EKG T AXIS: 159 DEGREES
EKG VENTRICULAR RATE: 71 BPM
EOSINOPHIL # BLD: 0.1 K/UL (ref 0–0.6)
EOSINOPHIL NFR BLD: 2.3 %
ETHANOLAMINE SERPL-MCNC: NORMAL MG/DL (ref 0–0.08)
GFR SERPLBLD CREATININE-BSD FMLA CKD-EPI: 76 ML/MIN/{1.73_M2}
GFR SERPLBLD CREATININE-BSD FMLA CKD-EPI: 85 ML/MIN/{1.73_M2}
GLUCOSE SERPL-MCNC: 112 MG/DL (ref 70–99)
GLUCOSE SERPL-MCNC: 129 MG/DL (ref 70–99)
GLUCOSE UR STRIP.AUTO-MCNC: NEGATIVE MG/DL
HCT VFR BLD AUTO: 44.3 % (ref 40.5–52.5)
HGB BLD-MCNC: 14.9 G/DL (ref 13.5–17.5)
HGB UR QL STRIP.AUTO: ABNORMAL
KETONES UR STRIP.AUTO-MCNC: NEGATIVE MG/DL
LACTATE BLDV-SCNC: 1.7 MMOL/L (ref 0.4–2)
LEUKOCYTE ESTERASE UR QL STRIP.AUTO: NEGATIVE
LYMPHOCYTES # BLD: 1.5 K/UL (ref 1–5.1)
LYMPHOCYTES NFR BLD: 25.7 %
MAGNESIUM SERPL-MCNC: 2.1 MG/DL (ref 1.8–2.4)
MCH RBC QN AUTO: 28.3 PG (ref 26–34)
MCHC RBC AUTO-ENTMCNC: 33.7 G/DL (ref 31–36)
MCV RBC AUTO: 83.9 FL (ref 80–100)
MONOCYTES # BLD: 0.5 K/UL (ref 0–1.3)
MONOCYTES NFR BLD: 8.6 %
NEUTROPHILS # BLD: 3.6 K/UL (ref 1.7–7.7)
NEUTROPHILS NFR BLD: 63.1 %
NITRITE UR QL STRIP.AUTO: NEGATIVE
PH UR STRIP.AUTO: 7 [PH] (ref 5–8)
PLATELET # BLD AUTO: 192 K/UL (ref 135–450)
PMV BLD AUTO: 8 FL (ref 5–10.5)
POTASSIUM SERPL-SCNC: 3.7 MMOL/L (ref 3.5–5.1)
POTASSIUM SERPL-SCNC: 4 MMOL/L (ref 3.5–5.1)
PROT UR STRIP.AUTO-MCNC: NEGATIVE MG/DL
RBC # BLD AUTO: 5.28 M/UL (ref 4.2–5.9)
RBC #/AREA URNS HPF: NORMAL /HPF (ref 0–4)
SALICYLATES SERPL-MCNC: <0.5 MG/DL (ref 15–30)
SODIUM SERPL-SCNC: 142 MMOL/L (ref 136–145)
SODIUM SERPL-SCNC: 143 MMOL/L (ref 136–145)
SP GR UR STRIP.AUTO: 1.01 (ref 1–1.03)
TROPONIN, HIGH SENSITIVITY: 10 NG/L (ref 0–22)
UA DIPSTICK W REFLEX MICRO PNL UR: YES
URN SPEC COLLECT METH UR: ABNORMAL
UROBILINOGEN UR STRIP-ACNC: 0.2 E.U./DL
WBC # BLD AUTO: 5.6 K/UL (ref 4–11)
WBC #/AREA URNS HPF: NORMAL /HPF (ref 0–5)

## 2025-05-18 PROCEDURE — 84484 ASSAY OF TROPONIN QUANT: CPT

## 2025-05-18 PROCEDURE — G0378 HOSPITAL OBSERVATION PER HR: HCPCS

## 2025-05-18 PROCEDURE — 85025 COMPLETE CBC W/AUTO DIFF WBC: CPT

## 2025-05-18 PROCEDURE — 80179 DRUG ASSAY SALICYLATE: CPT

## 2025-05-18 PROCEDURE — 2500000003 HC RX 250 WO HCPCS: Performed by: INTERNAL MEDICINE

## 2025-05-18 PROCEDURE — 82077 ASSAY SPEC XCP UR&BREATH IA: CPT

## 2025-05-18 PROCEDURE — 36415 COLL VENOUS BLD VENIPUNCTURE: CPT

## 2025-05-18 PROCEDURE — 81001 URINALYSIS AUTO W/SCOPE: CPT

## 2025-05-18 PROCEDURE — 80048 BASIC METABOLIC PNL TOTAL CA: CPT

## 2025-05-18 PROCEDURE — 93010 ELECTROCARDIOGRAM REPORT: CPT | Performed by: INTERNAL MEDICINE

## 2025-05-18 PROCEDURE — 80143 DRUG ASSAY ACETAMINOPHEN: CPT

## 2025-05-18 PROCEDURE — 99285 EMERGENCY DEPT VISIT HI MDM: CPT

## 2025-05-18 PROCEDURE — 6370000000 HC RX 637 (ALT 250 FOR IP)

## 2025-05-18 PROCEDURE — 93005 ELECTROCARDIOGRAM TRACING: CPT | Performed by: EMERGENCY MEDICINE

## 2025-05-18 PROCEDURE — 83605 ASSAY OF LACTIC ACID: CPT

## 2025-05-18 PROCEDURE — 2580000003 HC RX 258: Performed by: EMERGENCY MEDICINE

## 2025-05-18 PROCEDURE — 83735 ASSAY OF MAGNESIUM: CPT

## 2025-05-18 RX ORDER — MAGNESIUM SULFATE IN WATER 40 MG/ML
2000 INJECTION, SOLUTION INTRAVENOUS PRN
Status: DISCONTINUED | OUTPATIENT
Start: 2025-05-18 | End: 2025-05-19 | Stop reason: HOSPADM

## 2025-05-18 RX ORDER — SODIUM CHLORIDE 0.9 % (FLUSH) 0.9 %
5-40 SYRINGE (ML) INJECTION EVERY 12 HOURS SCHEDULED
Status: DISCONTINUED | OUTPATIENT
Start: 2025-05-18 | End: 2025-05-19 | Stop reason: HOSPADM

## 2025-05-18 RX ORDER — ACTIVATED CHARCOAL 208 MG/ML
25 SUSPENSION ORAL ONCE
Status: COMPLETED | OUTPATIENT
Start: 2025-05-18 | End: 2025-05-18

## 2025-05-18 RX ORDER — 0.9 % SODIUM CHLORIDE 0.9 %
500 INTRAVENOUS SOLUTION INTRAVENOUS ONCE
Status: COMPLETED | OUTPATIENT
Start: 2025-05-18 | End: 2025-05-18

## 2025-05-18 RX ORDER — ACETAMINOPHEN 650 MG/1
650 SUPPOSITORY RECTAL EVERY 6 HOURS PRN
Status: DISCONTINUED | OUTPATIENT
Start: 2025-05-18 | End: 2025-05-19 | Stop reason: HOSPADM

## 2025-05-18 RX ORDER — POLYETHYLENE GLYCOL 3350 17 G/17G
17 POWDER, FOR SOLUTION ORAL DAILY PRN
Status: DISCONTINUED | OUTPATIENT
Start: 2025-05-18 | End: 2025-05-19 | Stop reason: HOSPADM

## 2025-05-18 RX ORDER — POTASSIUM CHLORIDE 7.45 MG/ML
10 INJECTION INTRAVENOUS PRN
Status: DISCONTINUED | OUTPATIENT
Start: 2025-05-18 | End: 2025-05-19 | Stop reason: HOSPADM

## 2025-05-18 RX ORDER — PANTOPRAZOLE SODIUM 40 MG/1
40 TABLET, DELAYED RELEASE ORAL
Status: DISCONTINUED | OUTPATIENT
Start: 2025-05-19 | End: 2025-05-19 | Stop reason: HOSPADM

## 2025-05-18 RX ORDER — ENOXAPARIN SODIUM 100 MG/ML
40 INJECTION SUBCUTANEOUS DAILY
Status: DISCONTINUED | OUTPATIENT
Start: 2025-05-18 | End: 2025-05-19 | Stop reason: HOSPADM

## 2025-05-18 RX ORDER — SODIUM CHLORIDE 9 MG/ML
INJECTION, SOLUTION INTRAVENOUS PRN
Status: DISCONTINUED | OUTPATIENT
Start: 2025-05-18 | End: 2025-05-19 | Stop reason: HOSPADM

## 2025-05-18 RX ORDER — ACETAMINOPHEN 325 MG/1
650 TABLET ORAL EVERY 6 HOURS PRN
Status: DISCONTINUED | OUTPATIENT
Start: 2025-05-18 | End: 2025-05-19 | Stop reason: HOSPADM

## 2025-05-18 RX ORDER — POTASSIUM CHLORIDE 1500 MG/1
40 TABLET, EXTENDED RELEASE ORAL PRN
Status: DISCONTINUED | OUTPATIENT
Start: 2025-05-18 | End: 2025-05-19 | Stop reason: HOSPADM

## 2025-05-18 RX ORDER — SODIUM CHLORIDE 0.9 % (FLUSH) 0.9 %
5-40 SYRINGE (ML) INJECTION PRN
Status: DISCONTINUED | OUTPATIENT
Start: 2025-05-18 | End: 2025-05-19 | Stop reason: HOSPADM

## 2025-05-18 RX ADMIN — SODIUM CHLORIDE 500 ML: 0.9 INJECTION, SOLUTION INTRAVENOUS at 09:23

## 2025-05-18 RX ADMIN — ACTIVATED CHARCOAL 25 G: 208 SUSPENSION ORAL at 09:21

## 2025-05-18 RX ADMIN — SODIUM CHLORIDE, PRESERVATIVE FREE 10 ML: 5 INJECTION INTRAVENOUS at 19:54

## 2025-05-18 ASSESSMENT — PAIN - FUNCTIONAL ASSESSMENT: PAIN_FUNCTIONAL_ASSESSMENT: NONE - DENIES PAIN

## 2025-05-18 ASSESSMENT — LIFESTYLE VARIABLES: HOW OFTEN DO YOU HAVE A DRINK CONTAINING ALCOHOL: NEVER

## 2025-05-18 NOTE — PROGRESS NOTES
Patient admitted to room 217 from er.  Patient oriented to room, call light, bed rails, phone, lights and bathroom.  Patient instructed about the schedule of the day including: vital sign frequency, lab draws, possible tests, frequency of MD and staff rounds, including RN/MD rounding together at bedside, daily weights, and I &O's.  Patient instructed about prescribed diet, how to use 8MENU, and television.   bed alarm in place, patient aware of placement and reason.   Telemetry box  in place, patient aware of placement and reason.  Bed locked, in lowest position, side rails up 2/4, call light within reach.  Will continue to monitor.

## 2025-05-18 NOTE — ED PROVIDER NOTES
THIS IS MY RESIDENT SUPERVISORY AND SHARED VISIT NOTE:    I personally saw the patient and made/approved the management plan and take responsibility for the patient management.    I independently performed a history and physical on Willis Sandra.   All diagnostic, treatment, and disposition decisions were made by myself in conjunction with the resident physician. I personally saw the patient and performed a substantive portion of the visit including all aspects of the medical decision making.      For further details of Willis Sandra's emergency department encounter, please see Agustin Martel MD's documentation.      History: Patient is a 60-year-old male who accidentally was trying to take his morning medications and picked up a handful of hydroxychloroquine instead of his normal medications.  He reports the dosing was 200 mg and he took about 15.  This happened around 7:30 AM.  He states he currently feels normal and denies any headache, neck pain, chest pain, shortness of breath, abdominal pain, nausea, vomiting.  He has a history of sarcoidosis.  Due to concern for accidental overdose, he was brought to the ED by EMS for further evaluation.      Physical exam:   Gen: No acute distress.  Alert and answering questions appropriately.  Pysch: Normal mood and affect, denies any suicidal thoughts  HEENT: NCAT, PERRL, MMM  Neck: supple  Cardiac: RRR, pulses 2+ in upper extremities  Lungs: C2AB, no R/R/W  Abdomen: soft and nontender with no R/D/G  Neuro: no focal neuro deficits with strength and sensation 5/5 in all 4 extremities, GCS equals 15    The Ekg interpreted by me shows  normal sinus rhythm with a rate of 71  Axis is   Normal  QTc is  within an acceptable range  Intervals and Durations are unremarkable.      ST Segments: nonspecific changes  No significant change from prior EKG dated - 1/10/23  No STEMI, LBBB present today similar to old EKG, no PVCs today (they were present on old EKG)       Critical Care 
MAKING:     Patient is a 60 y.o. male with a significant PMHx of sarcoidosis, HTN, nonischemic cardiomyopathy presenting 1.5 hours after overdose of hydroxychloroquine ingesting approximately 3000 mg.  Poison control called.  Poison control  suggesting 24-hour EKG monitoring, CBC, CMP, troponin, BNP, UA with reflex to culture, alcohol, lactic acid, urine drug screen.     CBC, CMP, lipase troponin, within normal limits as of 11:40 AM.       CONSULTS:   IP CONSULT TO HOSPITALIST        Evidence-based risk rating tool (CMT):            Is this patient to be included in the SEP-1 Core Measure due to severe sepsis or septic shock?  No       CLINICAL IMPRESSION:   No diagnosis found.  Poison control called.   recommending 24-hour observation on EKG, activated charcoal given.  Will be admitted for monitoring due to risk of QTc prolongation causing life-threatening arrhythmias, hypotension, seizures.  Hospitalist team consulted to admit.       DISPOSITION:  I discussed the results, including any incidental findings, with patient and through shared decision making;     Disposition today from the ED will be: Admit to telemetry in stable condition.   Questions answered. They are agreeable to plan and express understanding of plan.     Social Determinants : None and Patient is Homeless: Provided resource guide      CRITICAL CARE:   Total critical care time is  minutes, which excludes separately billable procedures and updating family. Time spent is specifically for management of the presenting complaint and symptoms initially, direct bedside care, reevaluation, review of records, and consultation.  There was a high probability of clinically significant life-threatening deterioration in the patient's condition, which required my urgent intervention.     _____________________________________    DISCHARGE MEDICATIONS (if applicable):  New Prescriptions    No medications on file       DISCONTINUED

## 2025-05-18 NOTE — ED NOTES
217 @ [id number]  
[facility] poison control, [phone]     
Poison control updated with pt status  
(*)     All other components within normal limits   URINALYSIS - Abnormal; Notable for the following components:    Blood, Urine TRACE (*)     All other components within normal limits   SALICYLATE LEVEL - Abnormal; Notable for the following components:    Salicylate Lvl <0.5 (*)     All other components within normal limits   ACETAMINOPHEN LEVEL - Abnormal; Notable for the following components:    Acetaminophen Level <5 (*)     All other components within normal limits     Critical values: no  Intervention for critical value(s):     Abnormal Imaging: no     You may also review the ED PT Care Timeline found under the Summary Tab, ED Encounter Summary, Timeline Reports, ED Patient Care Timeline.     Recommendation    Pending orders/Uncompleted orders to hand off:  see MAR    Additional Comments: pt has been able to ambulate to bathroom with no difficulty, current lip coloring from charcoal. Pt currently on seizure precautions. Poison control has been updated with pt status at [phone].  If any further questions, please call Sending RN at [phone]

## 2025-05-18 NOTE — PLAN OF CARE
PLAN OF CARE    Received request for inpatient admission. Admitting provider Dr. Jarvis notified.    DELILAH MOSLEY MD  5/18/2025  9:35 AM

## 2025-05-18 NOTE — H&P
[facility] Medicine History & Physical    V 5.1    Date of Admission: 5/18/2025    Date of Service:  Pt seen/examined on 5/18/2025    []Admitted to Inpatient with expected LOS greater than two midnights due to medical therapy.  [x]Placed in Observation status.    Chief Admission Complaint: Accidental ingestion of Plaquenil    Presenting Admission History:      60 y.o. male who presented to [facility] with above complaint.  PMHx significant for systolic congestive heart failure nonischemic cardiomyopathy cardiac sarcoidosis  He accidentally took fifteen 200 mg of Plaquenil this morning he does not have any intention to kill himself  He was brought to the emergency room for further evaluation and management.  He was given charcoal in the emergency room.  All of his home medications this morning before it happened  No nausea vomiting diarrhea abdominal pain palpitation chest pain shortness of breath dizziness or change in mental status no seizure-like activities..      Assessment/Plan:        Accidental overdose of 15 tablets of 200 mg Plaquenil-Poison control was contacted from ER-advised to monitor cardiac rhythm for 24 hours, seizure precautions, monitor electrolytes keep potassium above 4 and magnesium above 2-patient was admitted to progressive care unit under observation  Patient was treated with charcoal in the emergency room  Home medications on hold  Protonix  Magnesium level pending    Cardiac sarcoidosis takes Plaquenil-on hold    History of congestive heart failure echocardiogram done in 2023 ejection fraction 45%  On Entresto beta-blocker Aldactone Jardiance  No evidence of fluid overload currently on hold    Nonischemic cardiomyopathy      History of hypertension blood pressure is controlled    GERD on Protonix    Hyperlipidemia on statin            Discussed management and the need for Hospitalization of the patient w/ the Emergency Department Provider: Dr. Lemus    CXR: I have

## 2025-05-19 VITALS
WEIGHT: 188 LBS | BODY MASS INDEX: 26.92 KG/M2 | RESPIRATION RATE: 16 BRPM | DIASTOLIC BLOOD PRESSURE: 75 MMHG | HEART RATE: 61 BPM | OXYGEN SATURATION: 97 % | HEIGHT: 70 IN | SYSTOLIC BLOOD PRESSURE: 127 MMHG | TEMPERATURE: 97.5 F

## 2025-05-19 LAB
ALBUMIN SERPL-MCNC: 4.2 G/DL (ref 3.4–5)
ALP SERPL-CCNC: 55 U/L (ref 40–129)
ALT SERPL-CCNC: 23 U/L (ref 10–40)
ANION GAP SERPL CALCULATED.3IONS-SCNC: 5 MMOL/L (ref 3–16)
ANION GAP SERPL CALCULATED.3IONS-SCNC: 7 MMOL/L (ref 3–16)
AST SERPL-CCNC: 22 U/L (ref 15–37)
BASOPHILS # BLD: 0 K/UL (ref 0–0.2)
BASOPHILS NFR BLD: 0.3 %
BILIRUB DIRECT SERPL-MCNC: 0.2 MG/DL (ref 0–0.3)
BILIRUB INDIRECT SERPL-MCNC: 0.1 MG/DL (ref 0–1)
BILIRUB SERPL-MCNC: 0.3 MG/DL (ref 0–1)
BUN SERPL-MCNC: 13 MG/DL (ref 7–20)
BUN SERPL-MCNC: 13 MG/DL (ref 7–20)
CALCIUM SERPL-MCNC: 9.2 MG/DL (ref 8.3–10.6)
CALCIUM SERPL-MCNC: 9.4 MG/DL (ref 8.3–10.6)
CHLORIDE SERPL-SCNC: 103 MMOL/L (ref 99–110)
CHLORIDE SERPL-SCNC: 104 MMOL/L (ref 99–110)
CO2 SERPL-SCNC: 26 MMOL/L (ref 21–32)
CO2 SERPL-SCNC: 27 MMOL/L (ref 21–32)
CREAT SERPL-MCNC: 1.1 MG/DL (ref 0.8–1.3)
CREAT SERPL-MCNC: 1.1 MG/DL (ref 0.8–1.3)
DEPRECATED RDW RBC AUTO: 13.8 % (ref 12.4–15.4)
EOSINOPHIL # BLD: 0.1 K/UL (ref 0–0.6)
EOSINOPHIL NFR BLD: 2 %
GFR SERPLBLD CREATININE-BSD FMLA CKD-EPI: 76 ML/MIN/{1.73_M2}
GFR SERPLBLD CREATININE-BSD FMLA CKD-EPI: 76 ML/MIN/{1.73_M2}
GLUCOSE SERPL-MCNC: 118 MG/DL (ref 70–99)
GLUCOSE SERPL-MCNC: 77 MG/DL (ref 70–99)
HCT VFR BLD AUTO: 40.5 % (ref 40.5–52.5)
HGB BLD-MCNC: 13.7 G/DL (ref 13.5–17.5)
LYMPHOCYTES # BLD: 1.4 K/UL (ref 1–5.1)
LYMPHOCYTES NFR BLD: 20 %
MCH RBC QN AUTO: 28.4 PG (ref 26–34)
MCHC RBC AUTO-ENTMCNC: 33.9 G/DL (ref 31–36)
MCV RBC AUTO: 83.8 FL (ref 80–100)
MONOCYTES # BLD: 0.6 K/UL (ref 0–1.3)
MONOCYTES NFR BLD: 8.9 %
NEUTROPHILS # BLD: 4.9 K/UL (ref 1.7–7.7)
NEUTROPHILS NFR BLD: 68.8 %
PHOSPHATE SERPL-MCNC: 3.6 MG/DL (ref 2.5–4.9)
PLATELET # BLD AUTO: 140 K/UL (ref 135–450)
PMV BLD AUTO: 7.8 FL (ref 5–10.5)
POTASSIUM SERPL-SCNC: 4 MMOL/L (ref 3.5–5.1)
POTASSIUM SERPL-SCNC: 4.2 MMOL/L (ref 3.5–5.1)
PROT SERPL-MCNC: 6.1 G/DL (ref 6.4–8.2)
RBC # BLD AUTO: 4.83 M/UL (ref 4.2–5.9)
SODIUM SERPL-SCNC: 136 MMOL/L (ref 136–145)
SODIUM SERPL-SCNC: 136 MMOL/L (ref 136–145)
WBC # BLD AUTO: 7.1 K/UL (ref 4–11)

## 2025-05-19 PROCEDURE — 6370000000 HC RX 637 (ALT 250 FOR IP): Performed by: INTERNAL MEDICINE

## 2025-05-19 PROCEDURE — 84100 ASSAY OF PHOSPHORUS: CPT

## 2025-05-19 PROCEDURE — 6360000002 HC RX W HCPCS: Performed by: INTERNAL MEDICINE

## 2025-05-19 PROCEDURE — 80048 BASIC METABOLIC PNL TOTAL CA: CPT

## 2025-05-19 PROCEDURE — 80076 HEPATIC FUNCTION PANEL: CPT

## 2025-05-19 PROCEDURE — G0378 HOSPITAL OBSERVATION PER HR: HCPCS

## 2025-05-19 PROCEDURE — 36415 COLL VENOUS BLD VENIPUNCTURE: CPT

## 2025-05-19 PROCEDURE — 85025 COMPLETE CBC W/AUTO DIFF WBC: CPT

## 2025-05-19 PROCEDURE — 2500000003 HC RX 250 WO HCPCS: Performed by: INTERNAL MEDICINE

## 2025-05-19 PROCEDURE — 96372 THER/PROPH/DIAG INJ SC/IM: CPT

## 2025-05-19 RX ADMIN — POTASSIUM CHLORIDE 40 MEQ: 1500 TABLET, EXTENDED RELEASE ORAL at 01:09

## 2025-05-19 RX ADMIN — ENOXAPARIN SODIUM 40 MG: 100 INJECTION SUBCUTANEOUS at 09:11

## 2025-05-19 RX ADMIN — SODIUM CHLORIDE, PRESERVATIVE FREE 10 ML: 5 INJECTION INTRAVENOUS at 09:12

## 2025-05-19 RX ADMIN — PANTOPRAZOLE SODIUM 40 MG: 40 TABLET, DELAYED RELEASE ORAL at 05:28

## 2025-05-19 NOTE — PROGRESS NOTES
PS message to Homa Naqvi NP regarding potassium.  59 y/o male admitted yesterday for accidental overdose of Plaquenil. Dr. Jarvis's note says to keep potassium >4. His last check was 3.7 He has PRN orders in but 3.7 is WNL and does not require replacement. Ok to administer the 40 meq PO dose?    Awaiting response.  Electronically signed by Helen Lamas RN on 5/19/2025 at 12:46 AM

## 2025-05-19 NOTE — PLAN OF CARE
Problem: Discharge Planning  Goal: Discharge to home or other facility with appropriate resources  Outcome: Progressing  Flowsheets (Taken 5/18/2025 1954)  Discharge to home or other facility with appropriate resources:   Identify barriers to discharge with patient and caregiver   Arrange for needed discharge resources and transportation as appropriate   Identify discharge learning needs (meds, wound care, etc)   Refer to discharge planning if patient needs post-hospital services based on physician order or complex needs related to functional status, cognitive ability or social support system     Problem: Safety - Adult  Goal: Free from fall injury  Outcome: Progressing  Flowsheets (Taken 5/18/2025 2018)  Free From Fall Injury:   Instruct family/caregiver on patient safety   Based on caregiver fall risk screen, instruct family/caregiver to ask for assistance with transferring infant if caregiver noted to have fall risk factors

## 2025-05-19 NOTE — PROGRESS NOTES
Calling poison control to update on the status of the patient. Spoke with Carmen and she stated \" We actually expected the pt to get fairly sick from this but it sounds like the charcoal did his job and we dont have concerns\" Secure message to md.

## 2025-05-19 NOTE — DISCHARGE SUMMARY
Hospital Medicine Discharge Summary    Patient: Willis Sandra   : 1965     Hospital:  [facility]  Admit Date: 2025   Discharge Date:    2025   Disposition:  Home   Code status:  Full  Condition at Discharge: Stable  Primary Care Provider: Veronika Blackman MD    Admitting Provider: Brian Jarvis MD  Discharge Provider: Brian Jarvis MD     Discharge Diagnoses:      Active Hospital Problems    Diagnosis     Drug overdose, accidental or unintentional, initial encounter [T50.371Q]        Presenting Admission History:      60 y.o. male who presented to [facility] with above complaint.  PMHx significant for systolic congestive heart failure nonischemic cardiomyopathy cardiac sarcoidosis  He accidentally took fifteen 200 mg of Plaquenil this morning he does not have any intention to kill himself  He was brought to the emergency room for further evaluation and management.  He was given charcoal in the emergency room.  All of his home medications this morning before it happened  No nausea vomiting diarrhea abdominal pain palpitation chest pain shortness of breath dizziness or change in mental status no seizure-like activities..       Assessment/Plan:      Accidental overdose of 15 tablets of 200 mg Plaquenil-Poison control was contacted from ER-advised to monitor cardiac rhythm for 24 hours, seizure precautions, monitor electrolytes keep potassium above 4 and magnesium above 2-patient was admitted to progressive care unit under observation  Patient was treated with charcoal in the emergency room  Home medications on hold  Protonix  Magnesium more than 2   No new c/o  Dc home - poison control notified   Advised to hold plaquenil for wekk/ till see PCP      Cardiac sarcoidosis takes Plaquenil-on hold- hols till see PCP      History of congestive heart failure echocardiogram done in  ejection fraction 45%  On Entresto beta-blocker Aldactone Jardiance  No evidence

## 2025-05-19 NOTE — PLAN OF CARE
Problem: Safety - Adult  Goal: Free from fall injury  Outcome: Progressing   Pt will remain free from falls throughout hospital stay. Fall precautions in place, bed alarm on, bed in lowest position with wheels locked and side rails 2/4 up. Room door open and hourly rounding completed. Will continue to monitor throughout shift.

## 2025-09-01 DIAGNOSIS — D86.85 CARDIAC SARCOIDOSIS: Primary | ICD-10-CM

## 2025-09-02 RX ORDER — MYCOPHENOLATE MOFETIL 500 MG/1
500 TABLET ORAL 2 TIMES DAILY
Qty: 180 TABLET | Refills: 3 | Status: SHIPPED | OUTPATIENT
Start: 2025-09-02